# Patient Record
Sex: FEMALE | Race: WHITE | NOT HISPANIC OR LATINO | Employment: OTHER | URBAN - METROPOLITAN AREA
[De-identification: names, ages, dates, MRNs, and addresses within clinical notes are randomized per-mention and may not be internally consistent; named-entity substitution may affect disease eponyms.]

---

## 2017-05-15 ENCOUNTER — APPOINTMENT (OUTPATIENT)
Dept: GENERAL RADIOLOGY | Facility: CLINIC | Age: 67
End: 2017-05-15
Attending: EMERGENCY MEDICINE
Payer: MEDICARE

## 2017-05-15 ENCOUNTER — HOSPITAL ENCOUNTER (EMERGENCY)
Facility: CLINIC | Age: 67
Discharge: HOME | End: 2017-05-15
Attending: EMERGENCY MEDICINE | Admitting: EMERGENCY MEDICINE
Payer: MEDICARE

## 2017-05-15 VITALS
OXYGEN SATURATION: 96 % | WEIGHT: 195 LBS | DIASTOLIC BLOOD PRESSURE: 72 MMHG | TEMPERATURE: 97.9 F | RESPIRATION RATE: 16 BRPM | SYSTOLIC BLOOD PRESSURE: 133 MMHG | BODY MASS INDEX: 32.7 KG/M2
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
## 2017-05-15 DIAGNOSIS — S82.141A TIBIAL PLATEAU FRACTURE, RIGHT, CLOSED, INITIAL ENCOUNTER: ICD-10-CM

## 2017-05-15 PROCEDURE — 99283 EMERGENCY DEPT VISIT LOW MDM: CPT

## 2017-05-15 PROCEDURE — 73560 X-RAY EXAM OF KNEE 1 OR 2: CPT | Mod: RT

## 2017-05-15 PROCEDURE — 99284 EMERGENCY DEPT VISIT MOD MDM: CPT | Performed by: EMERGENCY MEDICINE

## 2017-05-15 PROCEDURE — 73610 X-RAY EXAM OF ANKLE: CPT | Mod: RT

## 2017-05-15 RX ORDER — HYDROCODONE BITARTRATE AND ACETAMINOPHEN 5; 325 MG/1; MG/1
1-2 TABLET ORAL EVERY 6 HOURS PRN
Qty: 10 TABLET | Refills: 0 | Status: SHIPPED | OUTPATIENT
Start: 2017-05-15 | End: 2017-05-30

## 2017-05-15 RX ORDER — HYDROCODONE BITARTRATE AND ACETAMINOPHEN 5; 325 MG/1; MG/1
1-2 TABLET ORAL EVERY 6 HOURS PRN
Qty: 20 TABLET | Refills: 0 | Status: ON HOLD | OUTPATIENT
Start: 2017-05-15 | End: 2017-06-02

## 2017-05-15 NOTE — ED NOTES
Fell 4 ft from ladder at 1600 yesterday landing on rt knee - unable to wt bear and rt foot is numb and tinglyrt pedal pulse weaker than left

## 2017-05-15 NOTE — DISCHARGE INSTRUCTIONS
Knee Fracture    You have a break, or fracture, of the knee joint. This causes pain, swelling, and sometimes bruising.  This type of fracture is treated with a splint, cast, or knee brace, also called an immobilizer. It will take about 4 to 6 weeks for the fracture to heal. But it may take much longer for you to fully recover and go back to all your activities. Surgery may be needed to fix severe injuries.     Home care    You will be given a splint, cast, or knee brace to prevent your knee joint from moving. Use crutches or a walker, unless you were told otherwise. Don t bear weight on your injured leg until your provider says it s OK to do so. Crutches and walkers can be rented at many pharmacies and surgical or orthopedic supply stores.    Keep your leg raised, or elevated, to reduce pain and swelling. When sleeping, place a pillow under your injured leg. When sitting, support your injured leg so it is level with your waist. This is very important during the first 48 hours.    Apply an ice pack over the injured area for no more than 15 to 20 minutes. Do this every 1 to 2 hours for the first 24 to 48 hours. Keep using ice packs as needed to ease pain and swelling.    To make an ice pack, put ice cubes in a sealed zip-lock plastic bag wrapped in a clean, thin towel or cloth. Never put ice or an ice pack directly on your skin. The ice pack can be put right on the cast, splint, or brace. As the ice melts, be careful that the cast, splint, or brace doesn t get wet.    If you have a hook-and-loop closure knee brace, and your healthcare provider approves, open the brace to put the ice pack directly on your knee. Wrap the ice pack in a clean, thin towel or cloth. Be careful not to move your knee.     Keep the cast, splint, or brace dry at all times. Bathe with your cast, splint, or brace out of the water. Protect it with 2 large plastic bags. Place 1 bag around the other. Tape each bag with duct tape at the top end.  Water can still leak in. So it's best to keep the cast, splint, or brace away from water. If a fiberglass splint or cast gets wet, dry it with a hair dryer on a cool setting.    You may use over-the-counter pain medicine to ease pain, unless another pain medicine was prescribed. Always talk with your provider before using these medicines if you have chronic liver or kidney disease, or ever had a stomach ulcer or GI (gastrointestinal) bleeding.      Follow-up care  Follow up with your healthcare provider within 1 week, or as advised. This is to be sure the bone is healing properly.  If any X-rays were taken, you will be told of any new findings that may affect your care.     When to seek medical advice  Call your healthcare provider right away if any of the following occur:    The plaster cast or splint gets wet or soft    The fiberglass cast or splint stays wet for more than 24 hours    The cast has a bad smell    The plaster cast or splint becomes loose    There is increased knee pain or tightness under the brace, splint, or cast    Your toes become swollen, cold, blue, numb, or tingly    2919-3884 [employer]. [address] [place] [address], [place] [address]. All rights reserved. This information is not intended as a substitute for professional medical care. Always follow your healthcare professional's instructions.

## 2017-05-15 NOTE — ED AVS SNAPSHOT
[facility] Emergency Department    [address] [address]    Phone:  [phone]    Fax:  [phone]                                       Anna Dahl   MRN: [id number]    Department:  [facility] Emergency Department   Date of Visit:  5/15/2017           Patient Information     Date Of Birth          1950        Your diagnoses for this visit were:     Fall, initial encounter     Tibial plateau fracture, right, closed, initial encounter        You were seen by Kranthi Reza MD.        Discharge Instructions         Knee Fracture    You have a break, or fracture, of the knee joint. This causes pain, swelling, and sometimes bruising.  This type of fracture is treated with a splint, cast, or knee brace, also called an immobilizer. It will take about 4 to 6 weeks for the fracture to heal. But it may take much longer for you to fully recover and go back to all your activities. Surgery may be needed to fix severe injuries.     Home care    You will be given a splint, cast, or knee brace to prevent your knee joint from moving. Use crutches or a walker, unless you were told otherwise. Don t bear weight on your injured leg until your provider says it s OK to do so. Crutches and walkers can be rented at many pharmacies and surgical or orthopedic supply stores.    Keep your leg raised, or elevated, to reduce pain and swelling. When sleeping, place a pillow under your injured leg. When sitting, support your injured leg so it is level with your waist. This is very important during the first 48 hours.    Apply an ice pack over the injured area for no more than 15 to 20 minutes. Do this every 1 to 2 hours for the first 24 to 48 hours. Keep using ice packs as needed to ease pain and swelling.    To make an ice pack, put ice cubes in a sealed zip-lock plastic bag wrapped in a clean, thin towel or cloth. Never put ice or an ice pack directly on your skin. The ice pack can be put right on  the cast, splint, or brace. As the ice melts, be careful that the cast, splint, or brace doesn t get wet.    If you have a hook-and-loop closure knee brace, and your healthcare provider approves, open the brace to put the ice pack directly on your knee. Wrap the ice pack in a clean, thin towel or cloth. Be careful not to move your knee.     Keep the cast, splint, or brace dry at all times. Bathe with your cast, splint, or brace out of the water. Protect it with 2 large plastic bags. Place 1 bag around the other. Tape each bag with duct tape at the top end. Water can still leak in. So it's best to keep the cast, splint, or brace away from water. If a fiberglass splint or cast gets wet, dry it with a hair dryer on a cool setting.    You may use over-the-counter pain medicine to ease pain, unless another pain medicine was prescribed. Always talk with your provider before using these medicines if you have chronic liver or kidney disease, or ever had a stomach ulcer or GI (gastrointestinal) bleeding.      Follow-up care  Follow up with your healthcare provider within 1 week, or as advised. This is to be sure the bone is healing properly.  If any X-rays were taken, you will be told of any new findings that may affect your care.     When to seek medical advice  Call your healthcare provider right away if any of the following occur:    The plaster cast or splint gets wet or soft    The fiberglass cast or splint stays wet for more than 24 hours    The cast has a bad smell    The plaster cast or splint becomes loose    There is increased knee pain or tightness under the brace, splint, or cast    Your toes become swollen, cold, blue, numb, or tingly    4305-3234 [employer]. [address] [place] [address], [place] [address]. All rights reserved. This information is not intended as a substitute for professional medical care. Always follow your healthcare professional's instructions.          24 Hour Appointment Hotline        To make an appointment at any [facility] clinic, call [phone] ([phone]). If you don't have a family doctor or clinic, we will help you find one. [facility] clinics are conveniently located to serve the needs of you and your family.          ED Discharge Orders     Knee Immobilizer           ORTHO  REFERRAL       [facility] is referring you to the Orthopedic  Services at [facility].       The  Representative will assist you in the coordination of your Orthopedic and Musculoskeletal Care as prescribed by your physician.    The  Representative will call you within 1 business day to help schedule your appointment, or you may contact the  Representative at:    All areas ~ [phone]     Type of Referral : Non Surgical       Timeframe requested: 3 - 5 days    Coverage of these services is subject to the terms and limitations of your health insurance plan.  Please call member services at your health plan with any benefit or coverage questions.      If X-rays, CT or MRI's have been performed, please contact the facility where they were done to arrange for , prior to your scheduled appointment.  Please bring this referral request to your appointment and present it to your specialist.                     Review of your medicines      START taking        Dose / Directions Last dose taken    * HYDROcodone-acetaminophen 5-325 MG per tablet   Commonly known as:  NORCO   Dose:  1-2 tablet   Quantity:  10 tablet        Take 1-2 tablets by mouth every 6 hours as needed for moderate to severe pain   Refills:  0        * HYDROcodone-acetaminophen 5-325 MG per tablet   Commonly known as:  NORCO   Dose:  1-2 tablet   Quantity:  20 tablet        Take 1-2 tablets by mouth every 6 hours as needed for moderate to severe pain   Refills:  0        * Notice:  This list has 2 medication(s) that are the same as other medications  prescribed for you. Read the directions carefully, and ask your doctor or other care provider to review them with you.      Our records show that you are taking the medicines listed below. If these are incorrect, please call your family doctor or clinic.        Dose / Directions Last dose taken    acetaminophen 325 MG tablet   Commonly known as:  TYLENOL   Dose:  650 mg   Quantity:  250 tablet        Take 2 tablets by mouth every 4 hours as needed.   Refills:  0        albuterol 108 (90 BASE) MCG/ACT Inhaler   Commonly known as:  PROAIR HFA/PROVENTIL HFA/VENTOLIN HFA   Dose:  2 puff   Quantity:  1 Inhaler        Inhale 2 puffs into the lungs every 4 hours as needed for shortness of breath / dyspnea   Refills:  2        alendronate 70 MG tablet   Commonly known as:  FOSAMAX   Dose:  70 mg   Quantity:  13 tablet        Take 1 tablet (70 mg) by mouth every 7 days Take with over 8 ounces water and stay upright for at least 30 minutes after dose.  Take at least 60 minutes before breakfast.   Refills:  3        aspirin 81 MG EC tablet   Dose:  81 mg   Quantity:  90 tablet        Take 1 tablet (81 mg) by mouth daily   Refills:  3        atorvastatin 40 MG tablet   Commonly known as:  LIPITOR   Dose:  40 mg   Quantity:  90 tablet        Take 1 tablet (40 mg) by mouth daily   Refills:  3        fluticasone 110 MCG/ACT Inhaler   Commonly known as:  FLOVENT HFA   Dose:  2 puff   Quantity:  3 Inhaler        Inhale 2 puffs into the lungs 2 times daily   Refills:  3        fluticasone 50 MCG/ACT spray   Commonly known as:  FLONASE   Dose:  2 spray   Quantity:  16 g        Spray 2 sprays into both nostrils daily   Refills:  prn        lisinopril 5 MG tablet   Commonly known as:  PRINIVIL/ZESTRIL   Dose:  5 mg   Quantity:  90 tablet        Take 1 tablet (5 mg) by mouth daily   Refills:  3        LORazepam 1 MG tablet   Commonly known as:  ATIVAN   Dose:  0.5-1 mg   Quantity:  10 tablet        Take 0.5-1 tablets (0.5-1 mg) by mouth  every 8 hours as needed for anxiety Take the night before a big event; may repeat in the AM if needed.   Refills:  1        metoprolol 25 MG 24 hr tablet   Commonly known as:  TOPROL XL   Dose:  25 mg   Quantity:  90 tablet        Take 1 tablet (25 mg) by mouth daily   Refills:  3        montelukast 10 MG tablet   Commonly known as:  SINGULAIR   Dose:  10 mg   Quantity:  90 tablet        Take 1 tablet (10 mg) by mouth At Bedtime   Refills:  3        nitroglycerin 0.4 MG sublingual tablet   Commonly known as:  NITROSTAT   Dose:  0.4 mg   Quantity:  25 tablet        Place 1 tablet (0.4 mg) under the tongue every 5 minutes as needed for chest pain   Refills:  3        omeprazole 40 MG capsule   Commonly known as:  priLOSEC   Dose:  40 mg   Quantity:  180 capsule        Take 1 capsule (40 mg) by mouth every 12 hours   Refills:  3        sertraline 25 MG tablet   Commonly known as:  ZOLOFT   Dose:  25 mg   Quantity:  90 tablet        Take 1 tablet (25 mg) by mouth daily   Refills:  3        sucralfate 1 GM tablet   Commonly known as:  CARAFATE   Dose:  1 g   Quantity:  360 tablet        Take 1 tablet (1 g) by mouth 4 times daily May dissolve in 2 tsp water.   Refills:  3                Prescriptions were sent or printed at these locations (2 Prescriptions)                   Other Prescriptions                Printed at Department/Unit printer (2 of 2)         HYDROcodone-acetaminophen (NORCO) 5-325 MG per tablet               HYDROcodone-acetaminophen (NORCO) 5-325 MG per tablet                Procedures and tests performed during your visit     Ankle XR, G/E 3 views, right    Knee XR, 2 view, right      Orders Needing Specimen Collection     None      Pending Results     No orders found from 5/13/2017 to 5/16/2017.            Pending Culture Results     No orders found from 5/13/2017 to 5/16/2017.            Pending Results Instructions     If you had any lab results that were not finalized at the time of your  Discharge, you can call the ED Lab Result RN at [phone]. You will be contacted by this team for any positive Lab results or changes in treatment. The nurses are available 7 days a week from 10A to 6:30P.  You can leave a message 24 hours per day and they will return your call.        Test Results From Your Hospital Stay        5/15/2017  9:39 AM      Narrative     XR ANKLE RT G/E 3 VW 5/15/2017 9:28 AM    HISTORY: Painful right ankle.    COMPARISON: None.    FINDINGS: No fracture or malalignment. The mortise joint is congruent.  Osseous structures are within normal limits.        Impression     IMPRESSION: No acute osseous abnormality.    MARY PUGA MD         5/15/2017  9:40 AM      Narrative     XR KNEE RT 1 /2 VW 5/15/2017 9:30 AM    HISTORY: Painful right knee. Swelling.    COMPARISON: None.    FINDINGS: Slightly depressed lateral tibial plateau fracture.  Degenerative change at the lateral femoral condyle without definite  fracture. No additional fractures. Small joint effusion.        Impression     IMPRESSION: Slightly depressed lateral tibial plateau fracture.    MARY PUGA MD                Thank you for choosing [facility]       Thank you for choosing [facility] for your care. Our goal is always to provide you with excellent care. Hearing back from our patients is one way we can continue to improve our services. Please take a few minutes to complete the written survey that you may receive in the mail after you visit with us. Thank you!        "[employer]hart Information     [employer] gives you secure access to your electronic health record. If you see a primary care provider, you can also send messages to your care team and make appointments. If you have questions, please call your primary care clinic.  If you do not have a primary care provider, please call [phone] and they will assist you.        Care EveryWhere ID     This is your Care EveryWhere ID. This could be used by other organizations to access  your [facility] medical records  [id number]        After Visit Summary       This is your record. Keep this with you and show to your community pharmacist(s) and doctor(s) at your next visit.

## 2017-05-15 NOTE — ED AVS SNAPSHOT
[facility] Emergency Department    [address] [address]    Phone:  [phone]    Fax:  [phone]                                       Anna Dahl   MRN: [id number]    Department:  [facility] Emergency Department   Date of Visit:  5/15/2017           After Visit Summary Signature Page     I have received my discharge instructions, and my questions have been answered. I have discussed any challenges I see with this plan with the nurse or doctor.    ..........................................................................................................................................  Patient/Patient Representative Signature      ..........................................................................................................................................  Patient Representative Print Name and Relationship to Patient    ..................................................               ................................................  Date                                            Time    ..........................................................................................................................................  Reviewed by Signature/Title    ...................................................              ..............................................  Date                                                            Time

## 2017-05-16 NOTE — ED PROVIDER NOTES
Chief complaint fall right knee pain    66-year-old female presents from home with .  She was 4 feet up on a ladder yesterday when she lost her balance fell backward landing on her right knee at 4:00 PM.  She has pain with any range of motion of the right knee and is unable to bear weight.  She describes some tingling in the dorsolateral right foot.  She denies pain or injury elsewhere.  No striking her head, any pain in her neck back chest or pelvis or other extremities.  She is not on anticoagulation therapy, does take 81 mg aspirin daily    Past medical history, medications, allergies, social history, family history all reviewed.  Patient Active Problem List   Diagnosis     Nasal polyp     Hiatal hernia     Colon polyps     GERD (gastroesophageal reflux disease)     Hyperlipidemia LDL goal <100     Advanced directives, counseling/discussion     Mild intermittent asthma with exacerbation     Chronic sinusitis     Non-ST elevation myocardial infarction (NSTEMI), initial care episode (H)     CAD (coronary artery disease)     BCC (basal cell carcinoma), face     Anxiety     Osteoporosis     ROS: All other systems reviewed and are negative.    /72  Temp 97.9  F (36.6  C) (Oral)  Resp 16  Wt 88.5 kg (195 lb)  SpO2 96%  BMI 32.7 kg/m2  Nontoxic appearing no respiratory distress alert and oriented ×3, GCS 15 on arrival and discharge  Head atraumatic normocephalic  TMs/EACs unremarkable, conjunctiva noninjected, oropharynx moist without lesions or erythema  No cervical adenopathy neck supple full active painless range of motion, no posterior midline tenderness  Spine nontender  Rib stable nontender  Pelvis stable nontender  Lungs clear to auscultation  Heart regular no murmur  Abdomen soft nontender bowel sounds positive no masses or HSM  Strength and sensation grossly intact throughout the extremities, gait and station normal  Speech is fluent, good eye contact, thought processes are rational  Right  lower extremity significant for right knee effusion, tender joint line medially and laterally, very limited active and passive range of motion, unable to test for ligamentous instability secondary to pain, remainder of right lower extremity exam is unremarkable sensation intact, pulses intact.    Results for orders placed or performed during the hospital encounter of 05/15/17   Ankle XR, G/E 3 views, right    Narrative    XR ANKLE RT G/E 3 VW 5/15/2017 9:28 AM    HISTORY: Painful right ankle.    COMPARISON: None.    FINDINGS: No fracture or malalignment. The mortise joint is congruent.  Osseous structures are within normal limits.      Impression    IMPRESSION: No acute osseous abnormality.    MARY PUGA MD   Knee XR, 2 view, right    Narrative    XR KNEE RT 1 /2 VW 5/15/2017 9:30 AM    HISTORY: Painful right knee. Swelling.    COMPARISON: None.    FINDINGS: Slightly depressed lateral tibial plateau fracture.  Degenerative change at the lateral femoral condyle without definite  fracture. No additional fractures. Small joint effusion.      Impression    IMPRESSION: Slightly depressed lateral tibial plateau fracture.    MARY PUGA MD     MDM: 66-year-old female with a fall yesterday, lateral minimally depressed tibial plateau fracture, reviewed with orthopedics and recommend nonweightbearing ×6 weeks follow-up sports med.  Knee immobilizer, crutch tutorial, hydrocodone prescription warned regarding side effects.    Impression: Fall, right lateral tibial plateau fracture     Kranthi Reza MD  05/16/17 [id number]

## 2017-05-19 ENCOUNTER — OFFICE VISIT (OUTPATIENT)
Dept: ORTHOPEDICS | Facility: CLINIC | Age: 67
End: 2017-05-19
Payer: COMMERCIAL

## 2017-05-19 VITALS
HEIGHT: 65 IN | WEIGHT: 195 LBS | SYSTOLIC BLOOD PRESSURE: 127 MMHG | DIASTOLIC BLOOD PRESSURE: 71 MMHG | BODY MASS INDEX: 32.49 KG/M2 | HEART RATE: 62 BPM

## 2017-05-19 DIAGNOSIS — S82.141A TIBIAL PLATEAU FRACTURE, RIGHT, CLOSED, INITIAL ENCOUNTER: Primary | ICD-10-CM

## 2017-05-19 PROCEDURE — 99204 OFFICE O/P NEW MOD 45 MIN: CPT | Performed by: PEDIATRICS

## 2017-05-19 NOTE — PATIENT INSTRUCTIONS
Plan:  - Today's Plan of Care:  Referral to an Orthopedic Surgeon  Activity Modification: Non-weightbearing  Medical Equipment: immobilizer    Advanced imaging is done by appointment in the imaging center.  Depending on your availability you can usually schedule within the next 1-2 days. Some insurance require a prior authorization to be completed which may delay the time until you are able to schedule your appointment.  Please call Advanced [facility] Central [phone] to schedule your CT.     The clinic will call you with results, if you have not heard from the clinic within 3-4 days following your CT please contact us at the number listed below.     Follow Up: as needed

## 2017-05-19 NOTE — MR AVS SNAPSHOT
After Visit Summary   2017    Anna Dahl    MRN: [id number]           Patient Information     Date Of Birth          1950        Visit Information        Provider Department      2017 1:00 PM Faiza Valentin MD [facility]        Today's Diagnoses     Tibial plateau fracture, right, closed, initial encounter    -  1      Care Instructions    Plan:  - Today's Plan of Care:  Referral to an Orthopedic Surgeon  Activity Modification: Non-weightbearing  Medical Equipment: immobilizer    Advanced imaging is done by appointment in the imaging center.  Depending on your availability you can usually schedule within the next 1-2 days. Some insurance require a prior authorization to be completed which may delay the time until you are able to schedule your appointment.  Please call Advanced Imaging Central [phone] to schedule your CT.     The clinic will call you with results, if you have not heard from the clinic within 3-4 days following your CT please contact us at the number listed below.     Follow Up: as needed          Follow-ups after your visit        Additional Services     ORTHO  REFERRAL       [facility] is referring you to the Orthopedic  Services at [facility] and [facility].       The  Representative will assist you in the coordination of your Orthopedic and Musculoskeletal Care as prescribed by your physician.    The  Representative will call you within 24 hours to help schedule your appointment, or you may contact the  Representative at:    [facility] ~ [phone]  [facility] ~ [phone]  [facility] ~ [phone]    Type of Referral : Surgical / Specialist - Dr. Krause      Timeframe requested: Routine     Coverage of these services is subject to the terms and limitations of your health insurance plan.   "Please call member services at your health plan with any benefit or coverage questions.      If X-rays, CT or MRI's have been performed, please contact the facility where they were done to arrange for , prior to your scheduled appointment.  Please bring this referral request to your appointment and present it to your specialist.                  Future tests that were ordered for you today     Open Future Orders        Priority Expected Expires Ordered    CT Knee Right w/o Contrast Routine  5/19/2018 5/19/2017            Who to contact     If you have questions or need follow up information about today's clinic visit or your schedule please contact [facility] directly at [phone].  Normal or non-critical lab and imaging results will be communicated to you by MyChart, letter or phone within 4 business days after the clinic has received the results. If you do not hear from us within 7 days, please contact the clinic through [employer] or phone. If you have a critical or abnormal lab result, we will notify you by phone as soon as possible.  Submit refill requests through "[employer] or call your pharmacy and they will forward the refill request to us. Please allow 3 business days for your refill to be completed.          Additional Information About Your Visit        [employer] Information     "[employer] gives you secure access to your electronic health record. If you see a primary care provider, you can also send messages to your care team and make appointments. If you have questions, please call your primary care clinic.  If you do not have a primary care provider, please call [phone] and they will assist you.        Care EveryWhere ID     This is your Care EveryWhere ID. This could be used by other organizations to access your [place] medical records  [id number]        Your Vitals Were     Pulse Height BMI (Body Mass Index)             62 5' 4.75\" (1.645 m) 32.7 kg/m2          " Blood Pressure from Last 3 Encounters:   05/19/17 127/71   05/15/17 133/72   07/07/16 123/82    Weight from Last 3 Encounters:   05/19/17 195 lb (88.5 kg)   05/15/17 195 lb (88.5 kg)   07/07/16 204 lb 3.2 oz (92.6 kg)              We Performed the Following     ORTHO  REFERRAL        Primary Care Provider Office Phone # Fax #    Nadia Vivi Ramirez [phone]       [facility] [address] [address] [facility] [address]        Thank you!     Thank you for choosing [facility]  for your care. Our goal is always to provide you with excellent care. Hearing back from our patients is one way we can continue to improve our services. Please take a few minutes to complete the written survey that you may receive in the mail after your visit with us. Thank you!             Your Updated Medication List - Protect others around you: Learn how to safely use, store and throw away your medicines at www.disposemymeds.org.          This list is accurate as of: 5/19/17  1:33 PM.  Always use your most recent med list.                   Brand Name Dispense Instructions for use    acetaminophen 325 MG tablet    TYLENOL    250 tablet    Take 2 tablets by mouth every 4 hours as needed.       albuterol 108 (90 BASE) MCG/ACT Inhaler    PROAIR HFA/PROVENTIL HFA/VENTOLIN HFA    1 Inhaler    Inhale 2 puffs into the lungs every 4 hours as needed for shortness of breath / dyspnea       alendronate 70 MG tablet    FOSAMAX    13 tablet    Take 1 tablet (70 mg) by mouth every 7 days Take with over 8 ounces water and stay upright for at least 30 minutes after dose.  Take at least 60 minutes before breakfast.       aspirin 81 MG EC tablet     90 tablet    Take 1 tablet (81 mg) by mouth daily       atorvastatin 40 MG tablet    LIPITOR    90 tablet    Take 1 tablet (40 mg) by mouth daily       fluticasone 110 MCG/ACT Inhaler    FLOVENT HFA    3 Inhaler    Inhale 2 puffs into the lungs 2  times daily       fluticasone 50 MCG/ACT spray    FLONASE    16 g    Spray 2 sprays into both nostrils daily       * HYDROcodone-acetaminophen 5-325 MG per tablet    NORCO    10 tablet    Take 1-2 tablets by mouth every 6 hours as needed for moderate to severe pain       * HYDROcodone-acetaminophen 5-325 MG per tablet    NORCO    20 tablet    Take 1-2 tablets by mouth every 6 hours as needed for moderate to severe pain       lisinopril 5 MG tablet    PRINIVIL/ZESTRIL    90 tablet    Take 1 tablet (5 mg) by mouth daily       LORazepam 1 MG tablet    ATIVAN    10 tablet    Take 0.5-1 tablets (0.5-1 mg) by mouth every 8 hours as needed for anxiety Take the night before a big event; may repeat in the AM if needed.       metoprolol 25 MG 24 hr tablet    TOPROL XL    90 tablet    Take 1 tablet (25 mg) by mouth daily       montelukast 10 MG tablet    SINGULAIR    90 tablet    Take 1 tablet (10 mg) by mouth At Bedtime       nitroglycerin 0.4 MG sublingual tablet    NITROSTAT    25 tablet    Place 1 tablet (0.4 mg) under the tongue every 5 minutes as needed for chest pain       omeprazole 40 MG capsule    priLOSEC    180 capsule    Take 1 capsule (40 mg) by mouth every 12 hours       sertraline 25 MG tablet    ZOLOFT    90 tablet    Take 1 tablet (25 mg) by mouth daily       sucralfate 1 GM tablet    CARAFATE    360 tablet    Take 1 tablet (1 g) by mouth 4 times daily May dissolve in 2 tsp water.       * Notice:  This list has 2 medication(s) that are the same as other medications prescribed for you. Read the directions carefully, and ask your doctor or other care provider to review them with you.

## 2017-05-19 NOTE — PROGRESS NOTES
Sports Medicine Clinic Visit    PCP: Nadia Ramirez    Anna Dahl is a 66 year old female who is seen  as an ER referral presenting with a right knee injury.    Injury: Patient reports an injury 5 days ago, 5/14/17.  She fell from about 4 feet high on a ladder onto right knee.  Most of the pain is on the side, extends down her leg.  Does have significant swelling as well.    Location of Pain: superior knee, swelling and tingling into foot  Duration of Pain: 5 day(s)  Rating of Pain at worst: 10/10  Rating of Pain Currently: 4/10  Symptoms are better with: elevation  Symptoms are worse with: any movement  Additional Features:   Positive: swelling and bruising, numbness and tingling in foot.   Negative: popping, grinding, catching, locking, instability, weakness, pain in other joints and systemic symptoms  Other evaluation and/or treatments so far consists of: xr in ER  Prior History of related problems: mild soreness in the month prior to her fall    Social History: retired    Review of Systems  Skin: yes bruising, yes swelling  Musculoskeletal: as above  Neurologic: no numbness, paresthesias  Remainder of review of systems is negative including constitutional, CV, pulmonary, GI, except as noted in HPI or medical history.    Patient's current problem list, past medical and surgical history, and family history were reviewed.    Patient Active Problem List   Diagnosis     Nasal polyp     Hiatal hernia     Colon polyps     GERD (gastroesophageal reflux disease)     Hyperlipidemia LDL goal <100     Advanced directives, counseling/discussion     Mild intermittent asthma with exacerbation     Chronic sinusitis     Non-ST elevation myocardial infarction (NSTEMI), initial care episode (H)     CAD (coronary artery disease)     BCC (basal cell carcinoma), face     Anxiety     Osteoporosis     Past Medical History:   Diagnosis Date     Asthma      Basal cell carcinoma      Calculus of kidney      Gastro-oesophageal  "reflux disease      Renal colic      Unspecified asthma(493.90)      Unspecified nasal polyp     Nasal polyps     Unspecified otitis media      Unspecified sinusitis (chronic)     Chronic sinusitis     Past Surgical History:   Procedure Laterality Date     ENT SURGERY       ESOPHAGOSCOPY, GASTROSCOPY, DUODENOSCOPY (EGD), COMBINED  2/6/2013    Procedure: COMBINED ESOPHAGOSCOPY, GASTROSCOPY, DUODENOSCOPY (EGD), BIOPSY SINGLE OR MULTIPLE;  Gastroscopy;  Surgeon: Yves Mills MD;  Location: [facility]     ETHMOIDECTOMY  1/5/2011    ETHMOIDECTOMY performed by ADDY SERRANO at [facility]     MOHS MICROGRAPHIC PROCEDURE       MOHS MICROGRAPHIC PROCEDURE       SURGICAL HISTORY OF -   6/2003    Bilateral total ethmoidecomy with bilateral maxillary antrostomies with removal of polyps, bilateral frontal sinusotomies, and left sphenoidotomy     SURGICAL HISTORY OF -   1985    Bilateral intranasal polypectomy with bilateraly nasal antral punctures      SURGICAL HISTORY OF -   2004    Mohs micrographic surgery, fresh tissue technique with complex wound repair. below right ear at angle of jawline.     TUBAL LIGATION      tubal ligation     Family History   Problem Relation Age of Onset     HEART DISEASE Father      heart surgery     Lipids Father      Alzheimer Disease Mother      Lipids Brother      Lipids Brother          Objective  /71  Pulse 62  Ht 5' 4.75\" (1.645 m)  Wt 195 lb (88.5 kg)  BMI 32.7 kg/m2    GENERAL APPEARANCE: healthy, alert and no distress   GAIT: wheelchair  SKIN: no suspicious lesions or rashes  HEENT: Sclera clear, anicteric  CV: good peripheral pulses  RESP: Breathing not labored  NEURO: Normal strength and tone, mentation intact and speech normal  PSYCH:  mentation appears normal and affect normal/bright    Right Knee exam  *Very difficult exam due to patient positioning in wheelchair    Inspection:      moderate effusion right       mild swelling right    Patella:      Mobility -       " hypomobile right    Tender:      medial patellar border right       lateral patellar border right       medial joint line right       lateral joint line right    Non Tender:      remainder of knee area bilateral    Knee ROM:      Flexion 20 degrees right       Extension 0 degrees right    Strength:      Unable to test    Special Tests:     Unable to test    Gait:      unable to bear weight due to pain    Neurovascular:      2+ peripheral pulses bilaterally and brisk capillary refill       sensation grossly intact    Radiology  I visualized and reviewed these images with the patient  XR KNEE RT 1 /2 VW 5/15/2017 9:30 AM     HISTORY: Painful right knee. Swelling.     COMPARISON: None.     FINDINGS: Slightly depressed lateral tibial plateau fracture.  Degenerative change at the lateral femoral condyle without definite  fracture. No additional fractures. Small joint effusion.         IMPRESSION: Slightly depressed lateral tibial plateau fracture.    Assessment:  1. Tibial plateau fracture, right, closed, initial encounter      Slightly displaced tibial plateau fracture.  I recommend CT to evaluate and surgical referral to discuss options for treatment both operative and non-operative.  Continue bracing and other supportive care (rest, ice, elevation and compression) in the interim.    Plan:  - Today's Plan of Care:  Referral to an Orthopedic Surgeon  Activity Modification: Non-weightbearing  Medical Equipment: immobilizer    The clinic will call you with results, if you have not heard from the clinic within 3-4 days following your CT please contact us at the number listed below.     Follow Up: as needed    Concerning signs and symptoms were reviewed.  The patient expressed understanding of this management plan and all questions were answered at this time.    Faiza Valentin MD CAQ  Primary Care Sports Medicine  [facility]

## 2017-05-19 NOTE — NURSING NOTE
"Chief Complaint   Patient presents with     Knee right       Initial /71  Pulse 62  Ht 5' 4.75\" (1.645 m)  Wt 195 lb (88.5 kg)  BMI 32.7 kg/m2 Estimated body mass index is 32.7 kg/(m^2) as calculated from the following:    Height as of this encounter: 5' 4.75\" (1.645 m).    Weight as of this encounter: 195 lb (88.5 kg).  Medication Reconciliation: complete    "

## 2017-05-22 ENCOUNTER — HOSPITAL ENCOUNTER (OUTPATIENT)
Dept: CT IMAGING | Facility: CLINIC | Age: 67
Discharge: HOME | End: 2017-05-22
Attending: PEDIATRICS | Admitting: PEDIATRICS
Payer: MEDICARE

## 2017-05-22 DIAGNOSIS — S82.141A TIBIAL PLATEAU FRACTURE, RIGHT, CLOSED, INITIAL ENCOUNTER: ICD-10-CM

## 2017-05-22 PROCEDURE — 73700 CT LOWER EXTREMITY W/O DYE: CPT | Mod: RT

## 2017-05-24 ENCOUNTER — TELEPHONE (OUTPATIENT)
Dept: ORTHOPEDICS | Facility: CLINIC | Age: 67
End: 2017-05-24

## 2017-05-24 NOTE — TELEPHONE ENCOUNTER
Spoke with patient regarding their CT results and plan. Pt is in agreement with this plan. She has an appointment tomorrow with ortho surgery.     Dianna Brown M.Ed., ATR, ATC

## 2017-05-24 NOTE — TELEPHONE ENCOUNTER
Please call patient with CT results:    CT KNEE RIGHT WITHOUT CONTRAST May 22, 2017 12:56 PM  HISTORY: Evaluate tibial fracture.  TECHNIQUE: Helical axial scans with sagittal and coronal  reconstructions. Radiation dose for this scan was reduced using  automated exposure control, adjustment of the mA and/or kV according  to patient size, or iterative reconstruction technique.  COMPARISON: Plain film 5/15/2017.  FINDINGS: There is an impaction type fracture involving the lateral  tibial plateau. This is not very comminuted with basically one major  fracture fragment. Maximum articular surface step-off is seen far  medially measuring about 2 mm (see image 36 of series 5). No other  acute appearing bony abnormalities. Pre-existing degenerative changes  in the lateral compartment with joint space narrowing, subchondral  cystic change on both sides of the joint as well as degenerative  marginal bony spurring. The medial compartment is unremarkable to the  extent visualized by CT. Probable articular cartilage thinning at the  lateral aspect of the patellofemoral compartment. There is a  moderate-sized lipohemarthrosis and very small popliteal cyst.  Remainder of soft tissues appear normal to the extent visualized by  CT.  IMPRESSION:  1. Minimally depressed impaction type fracture lateral tibial plateau  with maximum articular surface step-off of 2 mm.  2. Lipohemarthrosis and very small popliteal cyst.  3. Pre-existing osteoarthritis lateral compartment and possibly  lateral aspect of the patellofemoral compartment.    In Summary:  - Depressed lateral tibial plateau fracture with 2mm of articular surface step off    I Recommend:  - Continued non weight bearing and follow up with orthopedics as discussed at last visit (patient may already have appointment as this order was placed at visit)    Faiza Valentin MD

## 2017-05-31 ENCOUNTER — ANESTHESIA EVENT (OUTPATIENT)
Dept: SURGERY | Facility: CLINIC | Age: 67
DRG: 470 | End: 2017-05-31
Payer: MEDICARE

## 2017-05-31 ENCOUNTER — OFFICE VISIT (OUTPATIENT)
Dept: FAMILY MEDICINE | Facility: CLINIC | Age: 67
End: 2017-05-31
Payer: COMMERCIAL

## 2017-05-31 VITALS
HEIGHT: 65 IN | DIASTOLIC BLOOD PRESSURE: 76 MMHG | SYSTOLIC BLOOD PRESSURE: 125 MMHG | RESPIRATION RATE: 18 BRPM | HEART RATE: 68 BPM | WEIGHT: 195 LBS | TEMPERATURE: 99 F | BODY MASS INDEX: 32.49 KG/M2

## 2017-05-31 DIAGNOSIS — K44.9 HIATAL HERNIA: ICD-10-CM

## 2017-05-31 DIAGNOSIS — J45.21 MILD INTERMITTENT ASTHMA WITH EXACERBATION: ICD-10-CM

## 2017-05-31 DIAGNOSIS — C44.310 BCC (BASAL CELL CARCINOMA), FACE: ICD-10-CM

## 2017-05-31 DIAGNOSIS — I21.4 NON-ST ELEVATION MYOCARDIAL INFARCTION (NSTEMI), INITIAL CARE EPISODE (H): ICD-10-CM

## 2017-05-31 DIAGNOSIS — E78.5 HYPERLIPIDEMIA LDL GOAL <100: ICD-10-CM

## 2017-05-31 DIAGNOSIS — M81.0 OSTEOPOROSIS: ICD-10-CM

## 2017-05-31 DIAGNOSIS — S82.141D TIBIAL PLATEAU FRACTURE, RIGHT, CLOSED, WITH ROUTINE HEALING, SUBSEQUENT ENCOUNTER: ICD-10-CM

## 2017-05-31 DIAGNOSIS — Z01.818 PREOP GENERAL PHYSICAL EXAM: Primary | ICD-10-CM

## 2017-05-31 DIAGNOSIS — I25.119 CORONARY ARTERY DISEASE INVOLVING NATIVE HEART WITH ANGINA PECTORIS, UNSPECIFIED VESSEL OR LESION TYPE (H): ICD-10-CM

## 2017-05-31 DIAGNOSIS — J32.9 CHRONIC SINUSITIS, UNSPECIFIED LOCATION: ICD-10-CM

## 2017-05-31 DIAGNOSIS — M17.11 OSTEOARTHRITIS OF RIGHT KNEE, UNSPECIFIED OSTEOARTHRITIS TYPE: ICD-10-CM

## 2017-05-31 DIAGNOSIS — Z71.89 ADVANCED DIRECTIVES, COUNSELING/DISCUSSION: ICD-10-CM

## 2017-05-31 DIAGNOSIS — K21.9 GASTROESOPHAGEAL REFLUX DISEASE WITHOUT ESOPHAGITIS: ICD-10-CM

## 2017-05-31 DIAGNOSIS — J33.9 NASAL POLYP: ICD-10-CM

## 2017-05-31 DIAGNOSIS — K63.5 COLON POLYPS: ICD-10-CM

## 2017-05-31 DIAGNOSIS — F41.9 ANXIETY: ICD-10-CM

## 2017-05-31 PROCEDURE — 99214 OFFICE O/P EST MOD 30 MIN: CPT | Performed by: FAMILY MEDICINE

## 2017-05-31 NOTE — PROGRESS NOTES
[facility]  [address]  [facility] [address]  [phone]  Dept: [phone]    PRE-OP EVALUATION:  Today's date: 2017    Anna Dahl (: 1950) presents for pre-operative evaluation assessment as requested by Dr. Colin Krause.  She requires evaluation and anesthesia risk assessment prior to undergoing surgery/procedure for treatment of  Right knee pain  .  Proposed procedure: Replacement Right knee     Date of Surgery/ Procedure: 17  Time of Surgery/ Procedure: 2 pm   Hospital/Surgical Facility: [facility], [place]     Primary Physician: Nadia Ramirez  Type of Anesthesia Anticipated: Spinal    Patient has a Health Care Directive or Living Will:  NO    1. YES - Do you have a history of heart attack, stroke, stent, bypass or surgery on an artery in the head, neck, heart or legs?  2. NO - Do you ever have any pain or discomfort in your chest?  3. NO - Do you have a history of  Heart Failure?  4. NO - Are you troubled by shortness of breath when: walking on the level, up a slight hill or at night?  5. NO - Do you currently have a cold, bronchitis or other respiratory infection?  6. NO - Do you have a cough, shortness of breath or wheezing?  7. NO - Do you sometimes get pains in the calves of your legs when you walk?  8. NO - Do you or anyone in your family have previous history of blood clots?  9. NO - Do you or does anyone in your family have a serious bleeding problem such as prolonged bleeding following surgeries or cuts?  10. NO - Have you ever had problems with anemia or been told to take iron pills?  11. NO - Have you had any abnormal blood loss such as black, tarry or bloody stools, or abnormal vaginal bleeding?  12. NO - Have you ever had a blood transfusion?  13. NO - Have you or any of your relatives ever had problems with anesthesia?  14. NO - Do you have sleep apnea, excessive snoring or daytime drowsiness?  15. NO - Do you have any prosthetic heart  "valves?  16. NO - Do you have prosthetic joints?  17. NO - Is there any chance that you may be pregnant?      HPI:                                                      Brief HPI related to upcoming procedure:       She is scheduled for right TKA. A couple weeks ago she fell and sustained a tibial plateau fracture on the right. She has seen the orthopedist in because of advanced osteoarthritis in that knee. She has opted to have a total knee arthroplasty. The surgeon is requesting consultation regarding anesthesia and surgical risk for this patient with respect to current and past medical conditions.      MEDICAL HISTORY:                                                      Patient Active Problem List    Diagnosis Date Noted     CAD (coronary artery disease) 05/14/2012     Priority: High     NSTEMI May 2012      EF 35%  PCI to mid-LAD vessel and first diagonal.  Plan to return at end of May 2012 for staged stenting of RCA  Rx Effient for one year, start Crestor       Osteoporosis 10/13/2015     Priority: Medium     Anxiety 10/03/2013     Priority: Medium     BCC (basal cell carcinoma), face 03/28/2013     Priority: Medium     Non-ST elevation myocardial infarction (NSTEMI), initial care episode (H) 05/13/2012     Priority: Medium     Chronic sinusitis 08/23/2011     Priority: Medium     Advanced directives, counseling/discussion 08/22/2011     Priority: Medium     Patient does not have an Advance/Health Care Directive (HCD), given \"What is Advance Care Planning?\" flyer.    Izzy Maria  August 22, 2011         Mild intermittent asthma with exacerbation 08/22/2011     Priority: Medium     GERD (gastroesophageal reflux disease) 04/20/2011     Priority: Medium     Hyperlipidemia LDL goal <100 04/20/2011     Priority: Medium     Did not tolerate Crestor  Did not tolerate 80 mg of atorvastatin, but has been able to take 40 mg       Colon polyps 10/19/2010     Priority: Medium     20 mm polyp removed Oct 2010  Recommend " repeat colonoscopy 1 year       Hiatal hernia 09/08/2010     Priority: Medium     Nasal polyp      Priority: Medium     Nasal polyps  Problem list name updated by automated process. Provider to review        Past Medical History:   Diagnosis Date     Asthma      Basal cell carcinoma      Calculus of kidney      Gastro-oesophageal reflux disease      Renal colic      Unspecified asthma(493.90)      Unspecified nasal polyp     Nasal polyps     Unspecified otitis media      Unspecified sinusitis (chronic)     Chronic sinusitis     Past Surgical History:   Procedure Laterality Date     ENT SURGERY       ESOPHAGOSCOPY, GASTROSCOPY, DUODENOSCOPY (EGD), COMBINED  2/6/2013    Procedure: COMBINED ESOPHAGOSCOPY, GASTROSCOPY, DUODENOSCOPY (EGD), BIOPSY SINGLE OR MULTIPLE;  Gastroscopy;  Surgeon: Yves Mills MD;  Location: [facility]     ETHMOIDECTOMY  1/5/2011    ETHMOIDECTOMY performed by ADDY SERRANO at [facility]     MOHS MICROGRAPHIC PROCEDURE       MOHS MICROGRAPHIC PROCEDURE       SURGICAL HISTORY OF -   6/2003    Bilateral total ethmoidecomy with bilateral maxillary antrostomies with removal of polyps, bilateral frontal sinusotomies, and left sphenoidotomy     SURGICAL HISTORY OF -   1985    Bilateral intranasal polypectomy with bilateraly nasal antral punctures      SURGICAL HISTORY OF -   2004    Mohs micrographic surgery, fresh tissue technique with complex wound repair. below right ear at angle of jawline.     TUBAL LIGATION      tubal ligation     Current Outpatient Prescriptions   Medication Sig Dispense Refill     order for DME Equipment being ordered: immobilizer 1 Device 0     HYDROcodone-acetaminophen (NORCO) 5-325 MG per tablet Take 1-2 tablets by mouth every 6 hours as needed for moderate to severe pain 20 tablet 0     lisinopril (PRINIVIL,ZESTRIL) 5 MG tablet Take 1 tablet (5 mg) by mouth daily 90 tablet 3     fluticasone (FLONASE) 50 MCG/ACT nasal spray Spray 2 sprays into both nostrils daily 16  g prn     atorvastatin (LIPITOR) 40 MG tablet Take 1 tablet (40 mg) by mouth daily 90 tablet 3     metoprolol (TOPROL XL) 25 MG 24 hr tablet Take 1 tablet (25 mg) by mouth daily 90 tablet 3     montelukast (SINGULAIR) 10 MG tablet Take 1 tablet (10 mg) by mouth At Bedtime 90 tablet 3     fluticasone (FLOVENT HFA) 110 MCG/ACT inhaler Inhale 2 puffs into the lungs 2 times daily 3 Inhaler 3     sucralfate (CARAFATE) 1 GM tablet Take 1 tablet (1 g) by mouth 4 times daily May dissolve in 2 tsp water. 360 tablet 3     omeprazole (PRILOSEC) 40 MG capsule Take 1 capsule (40 mg) by mouth every 12 hours 180 capsule 3     alendronate (FOSAMAX) 70 MG tablet Take 1 tablet (70 mg) by mouth every 7 days Take with over 8 ounces water and stay upright for at least 30 minutes after dose.  Take at least 60 minutes before breakfast. 13 tablet 3     LORazepam (ATIVAN) 1 MG tablet Take 0.5-1 tablets (0.5-1 mg) by mouth every 8 hours as needed for anxiety Take the night before a big event; may repeat in the AM if needed. 10 tablet 1     sertraline (ZOLOFT) 25 MG tablet Take 1 tablet (25 mg) by mouth daily 90 tablet 3     aspirin 81 MG EC tablet Take 1 tablet (81 mg) by mouth daily 90 tablet 3     nitroglycerin (NITROSTAT) 0.4 MG SL tablet Place 1 tablet (0.4 mg) under the tongue every 5 minutes as needed for chest pain 25 tablet 3     albuterol (PROAIR HFA, PROVENTIL HFA, VENTOLIN HFA) 108 (90 BASE) MCG/ACT inhaler Inhale 2 puffs into the lungs every 4 hours as needed for shortness of breath / dyspnea 1 Inhaler 2     acetaminophen (TYLENOL) 325 MG tablet Take 2 tablets by mouth every 4 hours as needed. 250 tablet      OTC products: None, except as noted above    Allergies   Allergen Reactions     Amoxicillin Anaphylaxis     Rosuvastatin Other (See Comments)     Severe headache, backache     Reglan [Metoclopramide Hcl] Visual Disturbance     Disoriented, hallucinations      Latex Allergy: NO    Social History   Substance Use Topics      "Smoking status: Never Smoker     Smokeless tobacco: Never Used     Alcohol use Yes      Comment: moderate couple drinks on weekends     History   Drug Use No       REVIEW OF SYSTEMS:                                                    Constitutional, neuro, ENT, endocrine, pulmonary, cardiac, gastrointestinal, genitourinary, musculoskeletal, integument and psychiatric systems are negative, except as otherwise noted.    EXAM:                                                    /76  Pulse 68  Temp 99  F (37.2  C)  Resp 18  Ht 5' 4.75\" (1.645 m)  Wt 195 lb (88.5 kg)  BMI 32.7 kg/m2    GENERAL APPEARANCE: healthy, alert and no distress     EYES: EOMI, PERRL     HENT: ear canals and TM's normal and nose and mouth without ulcers or lesions     NECK: no adenopathy, no asymmetry, masses, or scars and thyroid normal to palpation     RESP: lungs clear to auscultation - no rales, rhonchi or wheezes     CV: regular rates and rhythm, normal S1 S2, no S3 or S4 and no murmur, click or rub     ABDOMEN:  soft, nontender, no HSM or masses and bowel sounds normal     MS: extremities normal- no gross deformities noted, no evidence of inflammation in joints, FROM in all extremities.     SKIN: no suspicious lesions or rashes     NEURO: Normal strength and tone, sensory exam grossly normal, mentation intact and speech normal     PSYCH: mentation appears normal. and affect normal/bright     LYMPHATICS: No axillary, cervical, or supraclavicular nodes    DIAGNOSTICS:                                                    EKG: Not indicated due to non-vascular surgery and low risk of event (no recent symptoms or cardiac issues for last 3+ years)    Recent Labs   Lab Test  07/07/16   1109  05/15/14   0824  07/28/13   1108  04/12/13   0115   06/05/12   0745   05/31/12   0718   05/12/12   2356   HGB   --    --   13.1  13.7   < >   --    < >  12.9   < >  13.1   PLT   --    --   212  243   < >   --    < >  207   < >  207   INR   --    --    " --    --    --    --    --   1.11   --   1.11   NA  139  142  141  140   < >   --    < >  141   < >   --    POTASSIUM  4.3  4.4  4.4  4.0   < >   --    < >  4.4   < >   --    CR  0.87  0.92  0.75  0.93   < >   --    < >  0.88   < >   --    A1C   --    --    --    --    --   5.7   --    --    --    --     < > = values in this interval not displayed.        IMPRESSION:                                                    Reason for surgery/procedure: Tibia fracture  Diagnosis/reason for consult:    Preop general physical exam  Tibial plateau fracture, right, closed, with routine healing, subsequent encounter  Osteoarthritis of right knee, unspecified osteoarthritis type  Nasal polyp  Hiatal hernia  Colon polyps  Gastroesophageal reflux disease without esophagitis  Hyperlipidemia LDL goal <100  Advanced directives, counseling/discussion  Mild intermittent asthma with exacerbation  Chronic sinusitis, unspecified location  Non-ST elevation myocardial infarction (NSTEMI), initial care episode (H)  Coronary artery disease involving native heart with angina pectoris, unspecified vessel or lesion type (H)  BCC (basal cell carcinoma), face  Anxiety  Osteoporosis      The proposed surgical procedure is considered INTERMEDIATE risk.    REVISED CARDIAC RISK INDEX  The patient has the following serious cardiovascular risks for perioperative complications such as (MI, PE, VFib and 3  AV Block):  Coronary Artery Disease (MI, positive stress test, angina, Qs on EKG)  INTERPRETATION: 1 risks: Class II (low risk - 0.9% complication rate)    The patient has the following additional risks for perioperative complications:  No identified additional risks    No diagnosis found.    RECOMMENDATIONS:                                                          --Patient is to take all scheduled medications on the day of surgery EXCEPT for modifications listed below.  We discussed all of her medications and I answered her questions about taking  these. She is scheduled into the surgery schedule tomorrow because of a cancellation so she has been taking her aspirin up until today. I told her to stop taking that tomorrow morning. Her other medications should be okay to continue.    APPROVAL GIVEN to proceed with proposed procedure, without further diagnostic evaluation       Signed Electronically by: Deivn Yoo MD    Copy of this evaluation report is provided to requesting physician.    [facility] Preop Guidelines

## 2017-05-31 NOTE — MR AVS SNAPSHOT
After Visit Summary   5/31/2017    Anna Dahl    MRN: [id number]           Patient Information     Date Of Birth          1950        Visit Information        Provider Department      5/31/2017 1:20 PM Devin Yoo MD [facility]        Today's Diagnoses     Preop general physical exam    -  1    Tibial plateau fracture, right, closed, with routine healing, subsequent encounter        Osteoarthritis of right knee, unspecified osteoarthritis type        Nasal polyp        Hiatal hernia        Colon polyps        Gastroesophageal reflux disease without esophagitis        Hyperlipidemia LDL goal <100        Advanced directives, counseling/discussion        Mild intermittent asthma with exacerbation        Chronic sinusitis, unspecified location        Non-ST elevation myocardial infarction (NSTEMI), initial care episode (H)        Coronary artery disease involving native heart with angina pectoris, unspecified vessel or lesion type (H)        BCC (basal cell carcinoma), face        Anxiety        Osteoporosis          Care Instructions      Before Your Surgery      Call your surgeon if there is any change in your health. This includes signs of a cold or flu (such as a sore throat, runny nose, cough, rash or fever).    Do not smoke, drink alcohol or take over the counter medicine (unless your surgeon or primary care doctor tells you to) for the 24 hours before and after surgery.    If you take prescribed drugs: Follow your doctor s orders about which medicines to take and which to stop until after surgery.    Eating and drinking prior to surgery: follow the instructions from your surgeon    Take a shower or bath the night before surgery. Use the soap your surgeon gave you to gently clean your skin. If you do not have soap from your surgeon, use your regular soap. Do not shave or scrub the surgery site.  Wear clean pajamas and have clean sheets on your bed.            "Follow-ups after your visit        Your next 10 appointments already scheduled     Jun 01, 2017   Procedure with Colin Krause MD   [facility] (--)    [address] [address] [phone]           The medical center is located at [address]. (between [address] and [address] in [place], [place] miles north of [place]).              Who to contact     If you have questions or need follow up information about today's clinic visit or your schedule please contact [facility] directly at [phone].  Normal or non-critical lab and imaging results will be communicated to you by Reglarehart, letter or phone within 4 business days after the clinic has received the results. If you do not hear from us within 7 days, please contact the clinic through Morgan Solart or phone. If you have a critical or abnormal lab result, we will notify you by phone as soon as possible.  Submit refill requests through Realitycheck or call your pharmacy and they will forward the refill request to us. Please allow 3 business days for your refill to be completed.          Additional Information About Your Visit        MyChart Information     Realitycheck gives you secure access to your electronic health record. If you see a primary care provider, you can also send messages to your care team and make appointments. If you have questions, please call your primary care clinic.  If you do not have a primary care provider, please call [phone] and they will assist you.        Care EveryWhere ID     This is your Care EveryWhere ID. This could be used by other organizations to access your [facility] medical records  [id number]        Your Vitals Were     Pulse Temperature Respirations Height BMI (Body Mass Index)       68 99  F (37.2  C) 18 5' 4.75\" (1.645 m) 32.7 kg/m2        Blood Pressure from Last 3 Encounters:   05/31/17 125/76   05/19/17 127/71   05/15/17 133/72    Weight from Last 3 " Encounters:   05/31/17 195 lb (88.5 kg)   05/19/17 195 lb (88.5 kg)   05/15/17 195 lb (88.5 kg)              Today, you had the following     No orders found for display       Primary Care Provider Office Phone # Fax #    Nadia Vivi Ramirez [phone]       [facility] [address]  [facility] [address]        Thank you!     Thank you for choosing [facility]  for your care. Our goal is always to provide you with excellent care. Hearing back from our patients is one way we can continue to improve our services. Please take a few minutes to complete the written survey that you may receive in the mail after your visit with us. Thank you!             Your Updated Medication List - Protect others around you: Learn how to safely use, store and throw away your medicines at www.disposemymeds.org.          This list is accurate as of: 5/31/17  1:56 PM.  Always use your most recent med list.                   Brand Name Dispense Instructions for use    acetaminophen 325 MG tablet    TYLENOL    250 tablet    Take 2 tablets by mouth every 4 hours as needed.       albuterol 108 (90 BASE) MCG/ACT Inhaler    PROAIR HFA/PROVENTIL HFA/VENTOLIN HFA    1 Inhaler    Inhale 2 puffs into the lungs every 4 hours as needed for shortness of breath / dyspnea       alendronate 70 MG tablet    FOSAMAX    13 tablet    Take 1 tablet (70 mg) by mouth every 7 days Take with over 8 ounces water and stay upright for at least 30 minutes after dose.  Take at least 60 minutes before breakfast.       aspirin 81 MG EC tablet     90 tablet    Take 1 tablet (81 mg) by mouth daily       atorvastatin 40 MG tablet    LIPITOR    90 tablet    Take 1 tablet (40 mg) by mouth daily       fluticasone 110 MCG/ACT Inhaler    FLOVENT HFA    3 Inhaler    Inhale 2 puffs into the lungs 2 times daily       fluticasone 50 MCG/ACT spray    FLONASE    16 g    Spray 2 sprays into both nostrils daily        HYDROcodone-acetaminophen 5-325 MG per tablet    NORCO    20 tablet    Take 1-2 tablets by mouth every 6 hours as needed for moderate to severe pain       lisinopril 5 MG tablet    PRINIVIL/ZESTRIL    90 tablet    Take 1 tablet (5 mg) by mouth daily       LORazepam 1 MG tablet    ATIVAN    10 tablet    Take 0.5-1 tablets (0.5-1 mg) by mouth every 8 hours as needed for anxiety Take the night before a big event; may repeat in the AM if needed.       metoprolol 25 MG 24 hr tablet    TOPROL XL    90 tablet    Take 1 tablet (25 mg) by mouth daily       montelukast 10 MG tablet    SINGULAIR    90 tablet    Take 1 tablet (10 mg) by mouth At Bedtime       nitroglycerin 0.4 MG sublingual tablet    NITROSTAT    25 tablet    Place 1 tablet (0.4 mg) under the tongue every 5 minutes as needed for chest pain       omeprazole 40 MG capsule    priLOSEC    180 capsule    Take 1 capsule (40 mg) by mouth every 12 hours       order for DME     1 Device    Equipment being ordered: immobilizer       sertraline 25 MG tablet    ZOLOFT    90 tablet    Take 1 tablet (25 mg) by mouth daily       sucralfate 1 GM tablet    CARAFATE    360 tablet    Take 1 tablet (1 g) by mouth 4 times daily May dissolve in 2 tsp water.

## 2017-05-31 NOTE — ANESTHESIA PREPROCEDURE EVALUATION
Anesthesia Evaluation     . Pt has had prior anesthetic. Type: General and MAC    No history of anesthetic complications          ROS/MED HX    ENT/Pulmonary:     (+)Mild Persistent asthma Treatment: Inhaler prn and Inhaled steroids,  , . .    Neurologic:  - neg neurologic ROS     Cardiovascular: Comment: CAD (coronary artery disease) 05/14/2012       Priority: High      NSTEMI May 2012      EF 35%  PCI to mid-LAD vessel and first diagonal.  Plan to return at end of May 2012 for staged stenting of RCA  Rx Effient for one year, start Crestor    Pt. States she has 6 stents, outside records not available        (+) Dyslipidemia, --CAD, -past MI,-stent,6 . : . . . :. . Previous cardiac testing Echodate:results:Interpretation Summary  Normal stress echocardiogram. No stress induced regional wall motion   abnormalities. Normal resting LV function with EF of approximately 60-65%;   normal response to exercise with increase to approximately 70-75%.   Appropriate decrease of LV size and volume. No ECG evidence of ischemia. No   subjective evidence of ischemia. Normal functional capacity. No significant   valvular disease noted on routine screening color flow Doppler and pulsed   Doppler examination  PatientHeight: 65 in  PatientWeight: 170 lbs  SystolicPressure: 144 mmHg  DiastolicPressure: 86 mmHg  HeartRate: 53 bpm  BSA 1.8 m^2     Stress Findings  Maximum workload 125 sands.  Peak MVO2 19.4 ml/kg/min .  Percent predicted  MVO2 77 %.  RPP 22,576.  Patient was given 3ml of Optison.  Optison Expiration  5/28/14 .  Optison Lot # [id number] .     Aortic Valve  The aortic valve is normal in structure and function.     Mitral Valve  The mitral valve is normal in structure and function.     Tricuspid Valve  The tricuspid valve is normal.     Procedure:  Stress Echo Bike with two dimensional, color and spectral Doppler performed.  Contrast Optison.     MMode 2D Measurements & Calculations  asc Aorta: 3.3 cm  Doppler Measurements &  Calculations  MV E point: 74 cm/sec  MV A point: 70 cm/sec  MV E/A: 1.1   Ao V2 max: 116 cm/sec  Ao max P.0 mmHg     Stress Results  Protocol:  Bike Stress Echo    Target HR: 133 bpm            Maximum Predicted HR: 157 bpm      Stage        Duration(mm:ss)  HR(bpm)   BP        DOSE  Comment     Baseline       00:00          53        144/86                      Peak           07:00          136       166/95                          Stress Duration: 07:00 mm:ss  Recovery Time: 00:00 mm:ss    Maximum Stress HR: 136 bpm    METS:       Interpreting Physician:  Daisy Leon,  electronically signed on   04- 12:55:46date: results: date: results: date: results:          METS/Exercise Tolerance: Comment: Limited by knee pain 4 - Raking leaves, gardening   Hematologic:  - neg hematologic  ROS       Musculoskeletal:   (+) arthritis, , , -       GI/Hepatic:     (+) GERD Asymptomatic on medication, hiatal hernia, Other GI/Hepatic nasal/colon polyps      Renal/Genitourinary:     (+) Nephrolithiasis ,       Endo:     (+) Obesity, .      Psychiatric:     (+) psychiatric history anxiety      Infectious Disease:  - neg infectious disease ROS       Malignancy:   (+) Malignancy History of Skin          Other:    - neg other ROS                 Physical Exam  Normal systems: cardiovascular and pulmonary    Airway   Mallampati: II  TM distance: >3 FB  Neck ROM: full    Dental   (+) upper dentures and lower dentures    Cardiovascular   Rhythm and rate: regular and normal      Pulmonary    breath sounds clear to auscultation                    Anesthesia Plan      History & Physical Review  History and physical reviewed and following examination; no interval change.    ASA Status:  3 .    NPO Status:  > 8 hours    Plan for Spinal     SAB + Right adductor canal block post-op      Postoperative Care  Postoperative pain management:  IV analgesics, Oral pain medications and Peripheral nerve block (Single Shot).       Consents  Anesthetic plan, risks, benefits and alternatives discussed with:  Patient..                          .

## 2017-06-01 ENCOUNTER — APPOINTMENT (OUTPATIENT)
Dept: GENERAL RADIOLOGY | Facility: CLINIC | Age: 67
DRG: 470 | End: 2017-06-01
Attending: ORTHOPAEDIC SURGERY
Payer: MEDICARE

## 2017-06-01 ENCOUNTER — HOSPITAL ENCOUNTER (INPATIENT)
Facility: CLINIC | Age: 67
LOS: 3 days | Discharge: HOME HEALTH | DRG: 470 | End: 2017-06-04
Attending: ORTHOPAEDIC SURGERY | Admitting: ORTHOPAEDIC SURGERY
Payer: MEDICARE

## 2017-06-01 ENCOUNTER — ANESTHESIA (OUTPATIENT)
Dept: SURGERY | Facility: CLINIC | Age: 67
DRG: 470 | End: 2017-06-01
Payer: MEDICARE

## 2017-06-01 DIAGNOSIS — D64.9 POSTOPERATIVE ANEMIA: ICD-10-CM

## 2017-06-01 DIAGNOSIS — Z96.651 STATUS POST TOTAL RIGHT KNEE REPLACEMENT: Primary | ICD-10-CM

## 2017-06-01 DIAGNOSIS — I25.10 CORONARY ARTERY DISEASE INVOLVING NATIVE CORONARY ARTERY OF NATIVE HEART WITHOUT ANGINA PECTORIS: ICD-10-CM

## 2017-06-01 PROBLEM — Z96.659 S/P TOTAL KNEE ARTHROPLASTY: Status: ACTIVE | Noted: 2017-06-01

## 2017-06-01 PROCEDURE — 25000132 ZZH RX MED GY IP 250 OP 250 PS 637: Mod: GY | Performed by: ORTHOPAEDIC SURGERY

## 2017-06-01 PROCEDURE — 25000132 ZZH RX MED GY IP 250 OP 250 PS 637: Mod: GY | Performed by: PHYSICIAN ASSISTANT

## 2017-06-01 PROCEDURE — 25000128 H RX IP 250 OP 636: Performed by: NURSE ANESTHETIST, CERTIFIED REGISTERED

## 2017-06-01 PROCEDURE — 40000986 XR KNEE PORT RT 1/2 VW: Mod: RT

## 2017-06-01 PROCEDURE — A9270 NON-COVERED ITEM OR SERVICE: HCPCS | Mod: GY | Performed by: PHYSICIAN ASSISTANT

## 2017-06-01 PROCEDURE — 37000009 ZZH ANESTHESIA TECHNICAL FEE, EACH ADDTL 15 MIN: Performed by: ORTHOPAEDIC SURGERY

## 2017-06-01 PROCEDURE — 40000306 ZZH STATISTIC PRE PROC ASSESS II: Performed by: ORTHOPAEDIC SURGERY

## 2017-06-01 PROCEDURE — 25800025 ZZH RX 258: Performed by: ORTHOPAEDIC SURGERY

## 2017-06-01 PROCEDURE — 36000063 ZZH SURGERY LEVEL 4 EA 15 ADDTL MIN: Performed by: ORTHOPAEDIC SURGERY

## 2017-06-01 PROCEDURE — A9270 NON-COVERED ITEM OR SERVICE: HCPCS | Mod: GY | Performed by: ORTHOPAEDIC SURGERY

## 2017-06-01 PROCEDURE — 25000125 ZZHC RX 250: Performed by: NURSE ANESTHETIST, CERTIFIED REGISTERED

## 2017-06-01 PROCEDURE — 27810169 ZZH OR IMPLANT GENERAL: Performed by: ORTHOPAEDIC SURGERY

## 2017-06-01 PROCEDURE — 25000125 ZZHC RX 250: Performed by: ORTHOPAEDIC SURGERY

## 2017-06-01 PROCEDURE — 36000093 ZZH SURGERY LEVEL 4 1ST 30 MIN: Performed by: ORTHOPAEDIC SURGERY

## 2017-06-01 PROCEDURE — 25000128 H RX IP 250 OP 636: Performed by: PHYSICIAN ASSISTANT

## 2017-06-01 PROCEDURE — 71000014 ZZH RECOVERY PHASE 1 LEVEL 2 FIRST HR: Performed by: ORTHOPAEDIC SURGERY

## 2017-06-01 PROCEDURE — 37000011 ZZH ANESTHESIA WARD SERVICE: Performed by: NURSE ANESTHETIST, CERTIFIED REGISTERED

## 2017-06-01 PROCEDURE — 12000007 ZZH R&B INTERMEDIATE

## 2017-06-01 PROCEDURE — A9270 NON-COVERED ITEM OR SERVICE: HCPCS | Mod: GY | Performed by: NURSE ANESTHETIST, CERTIFIED REGISTERED

## 2017-06-01 PROCEDURE — 0SRC0J9 REPLACEMENT OF RIGHT KNEE JOINT WITH SYNTHETIC SUBSTITUTE, CEMENTED, OPEN APPROACH: ICD-10-PCS | Performed by: ORTHOPAEDIC SURGERY

## 2017-06-01 PROCEDURE — 37000008 ZZH ANESTHESIA TECHNICAL FEE, 1ST 30 MIN: Performed by: ORTHOPAEDIC SURGERY

## 2017-06-01 PROCEDURE — 25000125 ZZHC RX 250: Performed by: PHYSICIAN ASSISTANT

## 2017-06-01 PROCEDURE — S0077 INJECTION, CLINDAMYCIN PHOSP: HCPCS | Performed by: PHYSICIAN ASSISTANT

## 2017-06-01 PROCEDURE — C1776 JOINT DEVICE (IMPLANTABLE): HCPCS | Performed by: ORTHOPAEDIC SURGERY

## 2017-06-01 PROCEDURE — 25000132 ZZH RX MED GY IP 250 OP 250 PS 637: Mod: GY | Performed by: NURSE ANESTHETIST, CERTIFIED REGISTERED

## 2017-06-01 PROCEDURE — 27210794 ZZH OR GENERAL SUPPLY STERILE: Performed by: ORTHOPAEDIC SURGERY

## 2017-06-01 PROCEDURE — 27210995 ZZH RX 272: Performed by: ORTHOPAEDIC SURGERY

## 2017-06-01 PROCEDURE — 25000128 H RX IP 250 OP 636: Performed by: ORTHOPAEDIC SURGERY

## 2017-06-01 DEVICE — IMPLANTABLE DEVICE: Type: IMPLANTABLE DEVICE | Site: KNEE | Status: FUNCTIONAL

## 2017-06-01 DEVICE — IMP TIB BASE JJ ATTUNE RP SZ6 CEM 150610006: Type: IMPLANTABLE DEVICE | Site: KNEE | Status: FUNCTIONAL

## 2017-06-01 DEVICE — BONE CEMENT PALACOS 00-1112-140-01: Type: IMPLANTABLE DEVICE | Site: KNEE | Status: FUNCTIONAL

## 2017-06-01 RX ORDER — CLINDAMYCIN PHOSPHATE 900 MG/50ML
900 INJECTION, SOLUTION INTRAVENOUS SEE ADMIN INSTRUCTIONS
Status: DISCONTINUED | OUTPATIENT
Start: 2017-06-01 | End: 2017-06-01 | Stop reason: HOSPADM

## 2017-06-01 RX ORDER — SODIUM CHLORIDE, SODIUM LACTATE, POTASSIUM CHLORIDE, CALCIUM CHLORIDE 600; 310; 30; 20 MG/100ML; MG/100ML; MG/100ML; MG/100ML
INJECTION, SOLUTION INTRAVENOUS CONTINUOUS
Status: DISCONTINUED | OUTPATIENT
Start: 2017-06-01 | End: 2017-06-01 | Stop reason: HOSPADM

## 2017-06-01 RX ORDER — LIDOCAINE 40 MG/G
CREAM TOPICAL
Status: DISCONTINUED | OUTPATIENT
Start: 2017-06-01 | End: 2017-06-04 | Stop reason: HOSPADM

## 2017-06-01 RX ORDER — ACETAMINOPHEN 325 MG/1
975 TABLET ORAL ONCE
Status: COMPLETED | OUTPATIENT
Start: 2017-06-01 | End: 2017-06-01

## 2017-06-01 RX ORDER — AMOXICILLIN 250 MG
1-2 CAPSULE ORAL 2 TIMES DAILY
Status: DISCONTINUED | OUTPATIENT
Start: 2017-06-01 | End: 2017-06-04 | Stop reason: HOSPADM

## 2017-06-01 RX ORDER — FENTANYL CITRATE 50 UG/ML
INJECTION, SOLUTION INTRAMUSCULAR; INTRAVENOUS PRN
Status: DISCONTINUED | OUTPATIENT
Start: 2017-06-01 | End: 2017-06-01

## 2017-06-01 RX ORDER — LORAZEPAM 0.5 MG/1
0.5-1 TABLET ORAL EVERY 8 HOURS PRN
Status: DISCONTINUED | OUTPATIENT
Start: 2017-06-01 | End: 2017-06-04 | Stop reason: HOSPADM

## 2017-06-01 RX ORDER — HYDROMORPHONE HYDROCHLORIDE 1 MG/ML
.3-.5 INJECTION, SOLUTION INTRAMUSCULAR; INTRAVENOUS; SUBCUTANEOUS EVERY 5 MIN PRN
Status: DISCONTINUED | OUTPATIENT
Start: 2017-06-01 | End: 2017-06-01 | Stop reason: HOSPADM

## 2017-06-01 RX ORDER — MONTELUKAST SODIUM 10 MG/1
10 TABLET ORAL AT BEDTIME
Status: DISCONTINUED | OUTPATIENT
Start: 2017-06-01 | End: 2017-06-04 | Stop reason: HOSPADM

## 2017-06-01 RX ORDER — HYDROMORPHONE HYDROCHLORIDE 1 MG/ML
INJECTION, SOLUTION INTRAMUSCULAR; INTRAVENOUS; SUBCUTANEOUS
Status: DISCONTINUED
Start: 2017-06-01 | End: 2017-06-01 | Stop reason: HOSPADM

## 2017-06-01 RX ORDER — NALOXONE HYDROCHLORIDE 0.4 MG/ML
.1-.4 INJECTION, SOLUTION INTRAMUSCULAR; INTRAVENOUS; SUBCUTANEOUS
Status: DISCONTINUED | OUTPATIENT
Start: 2017-06-01 | End: 2017-06-04 | Stop reason: HOSPADM

## 2017-06-01 RX ORDER — ONDANSETRON 4 MG/1
4 TABLET, ORALLY DISINTEGRATING ORAL EVERY 30 MIN PRN
Status: DISCONTINUED | OUTPATIENT
Start: 2017-06-01 | End: 2017-06-01 | Stop reason: HOSPADM

## 2017-06-01 RX ORDER — ONDANSETRON 2 MG/ML
4 INJECTION INTRAMUSCULAR; INTRAVENOUS EVERY 30 MIN PRN
Status: DISCONTINUED | OUTPATIENT
Start: 2017-06-01 | End: 2017-06-01 | Stop reason: HOSPADM

## 2017-06-01 RX ORDER — METOPROLOL SUCCINATE 25 MG/1
25 TABLET, EXTENDED RELEASE ORAL DAILY
Status: DISCONTINUED | OUTPATIENT
Start: 2017-06-02 | End: 2017-06-03

## 2017-06-01 RX ORDER — OXYCODONE HYDROCHLORIDE 5 MG/1
5-10 TABLET ORAL EVERY 4 HOURS PRN
Status: DISCONTINUED | OUTPATIENT
Start: 2017-06-01 | End: 2017-06-02

## 2017-06-01 RX ORDER — ATORVASTATIN CALCIUM 20 MG/1
40 TABLET, FILM COATED ORAL DAILY
Status: DISCONTINUED | OUTPATIENT
Start: 2017-06-02 | End: 2017-06-04 | Stop reason: HOSPADM

## 2017-06-01 RX ORDER — ONDANSETRON 2 MG/ML
4 INJECTION INTRAMUSCULAR; INTRAVENOUS EVERY 6 HOURS PRN
Status: DISCONTINUED | OUTPATIENT
Start: 2017-06-01 | End: 2017-06-04 | Stop reason: HOSPADM

## 2017-06-01 RX ORDER — FLUTICASONE PROPIONATE 50 MCG
2 SPRAY, SUSPENSION (ML) NASAL DAILY
Status: DISCONTINUED | OUTPATIENT
Start: 2017-06-02 | End: 2017-06-04 | Stop reason: HOSPADM

## 2017-06-01 RX ORDER — FLUTICASONE PROPIONATE 110 UG/1
2 AEROSOL, METERED RESPIRATORY (INHALATION) 2 TIMES DAILY
Status: DISCONTINUED | OUTPATIENT
Start: 2017-06-01 | End: 2017-06-01 | Stop reason: CLARIF

## 2017-06-01 RX ORDER — PROPOFOL 10 MG/ML
INJECTION, EMULSION INTRAVENOUS CONTINUOUS PRN
Status: DISCONTINUED | OUTPATIENT
Start: 2017-06-01 | End: 2017-06-01

## 2017-06-01 RX ORDER — FENTANYL CITRATE 50 UG/ML
25-50 INJECTION, SOLUTION INTRAMUSCULAR; INTRAVENOUS
Status: DISCONTINUED | OUTPATIENT
Start: 2017-06-01 | End: 2017-06-01 | Stop reason: HOSPADM

## 2017-06-01 RX ORDER — HYDROXYZINE HYDROCHLORIDE 25 MG/1
25 TABLET, FILM COATED ORAL EVERY 6 HOURS PRN
Status: DISCONTINUED | OUTPATIENT
Start: 2017-06-01 | End: 2017-06-02

## 2017-06-01 RX ORDER — ALBUTEROL SULFATE 90 UG/1
2 AEROSOL, METERED RESPIRATORY (INHALATION) EVERY 4 HOURS PRN
Status: DISCONTINUED | OUTPATIENT
Start: 2017-06-01 | End: 2017-06-04 | Stop reason: HOSPADM

## 2017-06-01 RX ORDER — KETOROLAC TROMETHAMINE 30 MG/ML
30 INJECTION, SOLUTION INTRAMUSCULAR; INTRAVENOUS
Status: DISCONTINUED | OUTPATIENT
Start: 2017-06-01 | End: 2017-06-01 | Stop reason: HOSPADM

## 2017-06-01 RX ORDER — ROPIVACAINE HYDROCHLORIDE 5 MG/ML
INJECTION, SOLUTION EPIDURAL; INFILTRATION; PERINEURAL PRN
Status: DISCONTINUED | OUTPATIENT
Start: 2017-06-01 | End: 2017-06-01

## 2017-06-01 RX ORDER — SUCRALFATE 1 G/1
1 TABLET ORAL 4 TIMES DAILY
Status: DISCONTINUED | OUTPATIENT
Start: 2017-06-01 | End: 2017-06-04 | Stop reason: HOSPADM

## 2017-06-01 RX ORDER — CLINDAMYCIN PHOSPHATE 900 MG/50ML
900 INJECTION, SOLUTION INTRAVENOUS EVERY 8 HOURS
Status: COMPLETED | OUTPATIENT
Start: 2017-06-02 | End: 2017-06-02

## 2017-06-01 RX ORDER — ACETAMINOPHEN 325 MG/1
650 TABLET ORAL EVERY 4 HOURS PRN
Status: DISCONTINUED | OUTPATIENT
Start: 2017-06-04 | End: 2017-06-04 | Stop reason: HOSPADM

## 2017-06-01 RX ORDER — HYDROMORPHONE HYDROCHLORIDE 1 MG/ML
.3-.5 INJECTION, SOLUTION INTRAMUSCULAR; INTRAVENOUS; SUBCUTANEOUS
Status: DISCONTINUED | OUTPATIENT
Start: 2017-06-01 | End: 2017-06-04 | Stop reason: HOSPADM

## 2017-06-01 RX ORDER — GLYCOPYRROLATE 0.2 MG/ML
INJECTION, SOLUTION INTRAMUSCULAR; INTRAVENOUS PRN
Status: DISCONTINUED | OUTPATIENT
Start: 2017-06-01 | End: 2017-06-01

## 2017-06-01 RX ORDER — CLINDAMYCIN PHOSPHATE 900 MG/50ML
900 INJECTION, SOLUTION INTRAVENOUS
Status: COMPLETED | OUTPATIENT
Start: 2017-06-01 | End: 2017-06-01

## 2017-06-01 RX ORDER — DEXAMETHASONE SODIUM PHOSPHATE 4 MG/ML
INJECTION, SOLUTION INTRA-ARTICULAR; INTRALESIONAL; INTRAMUSCULAR; INTRAVENOUS; SOFT TISSUE PRN
Status: DISCONTINUED | OUTPATIENT
Start: 2017-06-01 | End: 2017-06-01

## 2017-06-01 RX ORDER — BUPIVACAINE HYDROCHLORIDE 7.5 MG/ML
INJECTION, SOLUTION INTRASPINAL PRN
Status: DISCONTINUED | OUTPATIENT
Start: 2017-06-01 | End: 2017-06-01

## 2017-06-01 RX ORDER — LIDOCAINE 40 MG/G
CREAM TOPICAL
Status: DISCONTINUED | OUTPATIENT
Start: 2017-06-01 | End: 2017-06-01 | Stop reason: HOSPADM

## 2017-06-01 RX ORDER — LISINOPRIL 5 MG/1
5 TABLET ORAL DAILY
Status: DISCONTINUED | OUTPATIENT
Start: 2017-06-02 | End: 2017-06-03

## 2017-06-01 RX ORDER — SERTRALINE HYDROCHLORIDE 25 MG/1
25 TABLET, FILM COATED ORAL DAILY
Status: DISCONTINUED | OUTPATIENT
Start: 2017-06-02 | End: 2017-06-04 | Stop reason: HOSPADM

## 2017-06-01 RX ORDER — DEXAMETHASONE SODIUM PHOSPHATE 10 MG/ML
10 INJECTION, SOLUTION INTRAMUSCULAR; INTRAVENOUS
Status: DISCONTINUED | OUTPATIENT
Start: 2017-06-01 | End: 2017-06-01 | Stop reason: HOSPADM

## 2017-06-01 RX ORDER — ACETAMINOPHEN 325 MG/1
975 TABLET ORAL EVERY 8 HOURS
Status: COMPLETED | OUTPATIENT
Start: 2017-06-01 | End: 2017-06-04

## 2017-06-01 RX ORDER — CYCLOBENZAPRINE HCL 5 MG
5 TABLET ORAL 3 TIMES DAILY PRN
Status: DISCONTINUED | OUTPATIENT
Start: 2017-06-01 | End: 2017-06-02

## 2017-06-01 RX ORDER — FENTANYL CITRATE 50 UG/ML
INJECTION, SOLUTION INTRAMUSCULAR; INTRAVENOUS
Status: DISCONTINUED
Start: 2017-06-01 | End: 2017-06-01 | Stop reason: HOSPADM

## 2017-06-01 RX ORDER — ALBUTEROL SULFATE 0.83 MG/ML
2.5 SOLUTION RESPIRATORY (INHALATION) EVERY 4 HOURS PRN
Status: DISCONTINUED | OUTPATIENT
Start: 2017-06-01 | End: 2017-06-01 | Stop reason: HOSPADM

## 2017-06-01 RX ORDER — LIDOCAINE HCL/EPINEPHRINE/PF 2%-1:200K
VIAL (ML) INJECTION PRN
Status: DISCONTINUED | OUTPATIENT
Start: 2017-06-01 | End: 2017-06-01

## 2017-06-01 RX ORDER — HYDRALAZINE HYDROCHLORIDE 20 MG/ML
2.5-5 INJECTION INTRAMUSCULAR; INTRAVENOUS EVERY 10 MIN PRN
Status: DISCONTINUED | OUTPATIENT
Start: 2017-06-01 | End: 2017-06-01 | Stop reason: HOSPADM

## 2017-06-01 RX ORDER — ONDANSETRON 4 MG/1
4 TABLET, ORALLY DISINTEGRATING ORAL EVERY 6 HOURS PRN
Status: DISCONTINUED | OUTPATIENT
Start: 2017-06-01 | End: 2017-06-04 | Stop reason: HOSPADM

## 2017-06-01 RX ORDER — MEPERIDINE HYDROCHLORIDE 25 MG/ML
12.5 INJECTION INTRAMUSCULAR; INTRAVENOUS; SUBCUTANEOUS EVERY 5 MIN PRN
Status: DISCONTINUED | OUTPATIENT
Start: 2017-06-01 | End: 2017-06-01 | Stop reason: HOSPADM

## 2017-06-01 RX ORDER — NITROGLYCERIN 0.4 MG/1
0.4 TABLET SUBLINGUAL EVERY 5 MIN PRN
Status: DISCONTINUED | OUTPATIENT
Start: 2017-06-01 | End: 2017-06-04 | Stop reason: HOSPADM

## 2017-06-01 RX ADMIN — ROPIVACAINE HYDROCHLORIDE 10 ML: 5 INJECTION, SOLUTION EPIDURAL; INFILTRATION; PERINEURAL at 19:27

## 2017-06-01 RX ADMIN — GLYCOPYRROLATE 0.2 MG: 0.2 INJECTION, SOLUTION INTRAMUSCULAR; INTRAVENOUS at 17:30

## 2017-06-01 RX ADMIN — CYCLOBENZAPRINE 5 MG: 5 TABLET, FILM COATED ORAL at 21:38

## 2017-06-01 RX ADMIN — SENNOSIDES AND DOCUSATE SODIUM 2 TABLET: 8.6; 5 TABLET ORAL at 22:20

## 2017-06-01 RX ADMIN — OXYCODONE HYDROCHLORIDE 5 MG: 5 TABLET ORAL at 22:20

## 2017-06-01 RX ADMIN — FENTANYL CITRATE 50 MCG: 50 INJECTION, SOLUTION INTRAMUSCULAR; INTRAVENOUS at 20:10

## 2017-06-01 RX ADMIN — HYDROMORPHONE HYDROCHLORIDE 0.5 MG: 1 INJECTION, SOLUTION INTRAMUSCULAR; INTRAVENOUS; SUBCUTANEOUS at 21:39

## 2017-06-01 RX ADMIN — FENTANYL CITRATE 100 MCG: 50 INJECTION, SOLUTION INTRAMUSCULAR; INTRAVENOUS at 17:30

## 2017-06-01 RX ADMIN — EPINEPHRINE 0.2 MG: 1 INJECTION, SOLUTION INTRAMUSCULAR; SUBCUTANEOUS at 17:33

## 2017-06-01 RX ADMIN — TRANEXAMIC ACID 1 G: 100 INJECTION, SOLUTION INTRAVENOUS at 18:57

## 2017-06-01 RX ADMIN — MONTELUKAST 10 MG: 10 TABLET, FILM COATED ORAL at 21:38

## 2017-06-01 RX ADMIN — LORAZEPAM 0.5 MG: 0.5 TABLET ORAL at 22:21

## 2017-06-01 RX ADMIN — SODIUM CHLORIDE, POTASSIUM CHLORIDE, SODIUM LACTATE AND CALCIUM CHLORIDE: 600; 310; 30; 20 INJECTION, SOLUTION INTRAVENOUS at 17:46

## 2017-06-01 RX ADMIN — SODIUM CHLORIDE, POTASSIUM CHLORIDE, SODIUM LACTATE AND CALCIUM CHLORIDE: 600; 310; 30; 20 INJECTION, SOLUTION INTRAVENOUS at 14:30

## 2017-06-01 RX ADMIN — HYDROMORPHONE HYDROCHLORIDE 0.5 MG: 1 INJECTION, SOLUTION INTRAMUSCULAR; INTRAVENOUS; SUBCUTANEOUS at 23:37

## 2017-06-01 RX ADMIN — BUPIVACAINE HYDROCHLORIDE IN DEXTROSE 1.7 ML: 7.5 INJECTION, SOLUTION SUBARACHNOID at 17:33

## 2017-06-01 RX ADMIN — TRANEXAMIC ACID 1 G: 100 INJECTION, SOLUTION INTRAVENOUS at 17:29

## 2017-06-01 RX ADMIN — ROPIVACAINE HYDROCHLORIDE 20 ML: 5 INJECTION, SOLUTION EPIDURAL; INFILTRATION; PERINEURAL at 19:26

## 2017-06-01 RX ADMIN — HYDROMORPHONE HYDROCHLORIDE 0.5 MG: 1 INJECTION, SOLUTION INTRAMUSCULAR; INTRAVENOUS; SUBCUTANEOUS at 20:17

## 2017-06-01 RX ADMIN — MIDAZOLAM HYDROCHLORIDE 1 MG: 1 INJECTION, SOLUTION INTRAMUSCULAR; INTRAVENOUS at 17:32

## 2017-06-01 RX ADMIN — PROPOFOL 100 MCG/KG/MIN: 10 INJECTION, EMULSION INTRAVENOUS at 17:37

## 2017-06-01 RX ADMIN — CLINDAMYCIN PHOSPHATE 900 MG: 18 INJECTION, SOLUTION INTRAVENOUS at 17:26

## 2017-06-01 RX ADMIN — DEXAMETHASONE SODIUM PHOSPHATE 10 MG: 4 INJECTION, SOLUTION INTRA-ARTICULAR; INTRALESIONAL; INTRAMUSCULAR; INTRAVENOUS; SOFT TISSUE at 17:32

## 2017-06-01 RX ADMIN — ACETAMINOPHEN 975 MG: 325 TABLET, FILM COATED ORAL at 14:00

## 2017-06-01 RX ADMIN — LIDOCAINE HYDROCHLORIDE 1 ML: 10 INJECTION, SOLUTION INFILTRATION; PERINEURAL at 14:30

## 2017-06-01 RX ADMIN — SUCRALFATE 1 G: 1 TABLET ORAL at 23:40

## 2017-06-01 RX ADMIN — OMEPRAZOLE 40 MG: 20 CAPSULE, DELAYED RELEASE ORAL at 22:20

## 2017-06-01 RX ADMIN — ACETAMINOPHEN 975 MG: 325 TABLET, FILM COATED ORAL at 21:37

## 2017-06-01 RX ADMIN — LIDOCAINE HYDROCHLORIDE,EPINEPHRINE BITARTRATE 5 ML: 20; .005 INJECTION, SOLUTION EPIDURAL; INFILTRATION; INTRACAUDAL; PERINEURAL at 19:23

## 2017-06-01 RX ADMIN — MIDAZOLAM HYDROCHLORIDE 2 MG: 1 INJECTION, SOLUTION INTRAMUSCULAR; INTRAVENOUS at 17:29

## 2017-06-01 RX ADMIN — MIDAZOLAM HYDROCHLORIDE 2 MG: 1 INJECTION, SOLUTION INTRAMUSCULAR; INTRAVENOUS at 17:31

## 2017-06-01 ASSESSMENT — ASTHMA QUESTIONNAIRES: ACT_TOTALSCORE: 25

## 2017-06-01 NOTE — IP AVS SNAPSHOT
MRN:[id number]                      After Visit Summary   6/1/2017    Anna Dahl    MRN: [id number]           Thank you!     Thank you for choosing [facility] for your care. Our goal is always to provide you with excellent care. Hearing back from our patients is one way we can continue to improve our services. Please take a few minutes to complete the written survey that you may receive in the mail after you visit with us. Thank you!        Patient Information     Date Of Birth          1950        Designated Caregiver       Most Recent Value    Caregiver    Will someone help with your care after discharge? yes    Name of designated caregiver Jung    Phone number of caregiver [phone]    Caregiver address same as pt      About your hospital stay     You were admitted on:  June 1, 2017 You last received care in the:  [facility]    You were discharged on:  June 4, 2017        Reason for your hospital stay       Right total knee arthroplasty                  Who to Call     For medical emergencies, please call 911.  For non-urgent questions about your medical care, please call your primary care provider or clinic, [phone]  For questions related to your surgery, please call your surgery clinic        Attending Provider     Provider Specialty    Colin Krause MD Orthopedics       Primary Care Provider Office Phone # Fax #    Nadia Vivi Ramirez [phone]       When to contact your care team       Call your Orthopedic surgeon at [facility]  if you have any of the following: temperature greater than 100.4,  increased shortness of breath, increased drainage, increased swelling or increased pain.                  After Care Instructions     Activity       Your activity upon discharge: activity as tolerated and no driving until off narcotic pain medication            Diet       Follow this diet upon discharge: Orders Placed This  Encounter      Advance Diet as Tolerated: Regular Diet Adult              Wound care and dressings       Instructions to care for your wound at home: as directed, daily dressing changes, ice to area for comfort, keep wound clean and dry and may get incision wet in shower but do not soak or scrub.                  Follow-up Appointments     Follow-up and recommended labs and tests       Follow up with  Suzette Salcido PA-C , at (location with clinic name or city) [facility], within 2 weeks to evaluate after surgery and for hospital follow- up.  [phone]     Follow up with Primary MD in one week with hemoglobin lab draw.                  Your next 10 appointments already scheduled     Jun 08, 2017  2:40 PM CDT   Office Visit with Nadia Ramirez MD   [facility] ([facility])    [address] [address] [phone]           Bring a current list of meds and any records pertaining to this visit.  For Physicals, please bring immunization records and any forms needing to be filled out.  Please arrive 10 minutes early to complete paperwork.              Additional Services     Home Care PT Referral for Hospital Discharge       PT to eval and treat    Your provider has ordered home care - physical therapy. If you have not been contacted within 2 days of your discharge please call the department phone number listed on the top of this document.            Home Care Referral       Your provider has referred you to: [facility]: [facility] [phone]   [email or web address]    Extended Emergency Contact Information  Primary Emergency Contact: FAVIAN GONZALEZ  Address: [address] [place] [address] [address]  Home Phone: [phone]  Mobile Phone: [phone]  Relation: Spouse  Secondary Emergency Contact: ISAIAS GONZALEZ [place]  [address] [address]  Home Phone: [phone]  Mobile Phone: [phone]  Relation: Son    Patient Anticipated Discharge Date: 1-2 days   RN, PT, HHA to begin 24 - 48 hours after discharge.  PLEASE EVALUATE AND TREAT (Evaluation timeline is 24 - 48 hrs. Please call if there is need for a variance to this timeline).    REASON FOR REFERRAL: Assessment & Treatment: PT and RN    ADDITIONAL SERVICES NEEDED: None    OTHER PERTINENT INFORMATION: Patient was last seen by provider on 6/2/17 for TKA.    Current Outpatient Prescriptions:  oxyCODONE (ROXICODONE) 5 MG IR tablet, Take 1-2 tablets (5-10 mg) by mouth every 4 hours as needed for moderate to severe pain, Disp: 60 tablet, Rfl: 0  aspirin  MG EC tablet, Take 1 tablet (325 mg) by mouth daily, Disp: 40 tablet, Rfl: 0  hydrOXYzine (ATARAX) 25 MG tablet, Take 1 tablet (25 mg) by mouth every 6 hours as needed for itching or other (adjunct pain), Disp: 60 tablet, Rfl: 0  senna-docusate (SENOKOT-S;PERICOLACE) 8.6-50 MG per tablet, Take 1-2 tablets by mouth 2 times daily, Disp: 100 tablet, Rfl: 1  order for DME, Equipment being ordered: Walker Wheels ()  Treatment Diagnosis: s/p TKA, Disp: 1 Units, Rfl: 0      Patient Active Problem List:     Nasal polyp     Hiatal hernia     Colon polyps     GERD (gastroesophageal reflux disease)     Hyperlipidemia LDL goal <100     Advanced directives, counseling/discussion     Mild intermittent asthma with exacerbation     Chronic sinusitis     Non-ST elevation myocardial infarction (NSTEMI), initial care episode (H)     CAD (coronary artery disease)     BCC (basal cell carcinoma), face     Anxiety     Osteoporosis     S/P total knee arthroplasty     Elevated glucose      Documentation of Face to Face and Certification for Home Health Services    I certify that patientAnna is under my care and that I, or a Nurse Practitioner or Physician's Assistant working with me, had a face-to-face encounter that meets the  physician face-to-face encounter requirements with this patient on: 6/2/2017.    This encounter with the patient was in whole, or in part, for the following medical condition, which is the primary reason for Home Health Care: TKA.    I certify that, based on my findings, the following services are medically necessary Home Health Services: Nursing and Physical Therapy    My clinical findings support the need for the above services because: Physical Therapy Services are needed to assess and treat the following functional impairments: TKA.    Further, I certify that my clinical findings support that this patient is homebound (i.e. absences from home require considerable and taxing effort and are for medical reasons or [employer] services or infrequently or of short duration when for other reasons) because: Requires assistance of another person or specialized equipment to access medical services because patient: Requires supervision of another for safe transfer..    Based on the above findings, I certify that this patient is confined to the home and needs intermittent skilled nursing care, physical therapy and/or speech therapy.  The patient is under my care, and I have initiated the establishment of the plan of care.  This patient will be followed by a physician who will periodically review the plan of care.    Physician/Provider to provide follow up care: Nadia Ramirez    Responsible RAEANN certified Physician at time of discharge: Dr. Krause    Please be aware that coverage of these services is subject to the terms and limitations of your health insurance plan.  Call member services at your health plan with any benefit or coverage questions.            Physical Therapy Referral       *This therapy referral will be filtered to a centralized scheduling office at [facility] and the patient will receive a call to schedule an appointment at a [facility] location most convenient for them. *  "    [facility] provides Physical Therapy evaluation and treatment and many specialty services across the [facility] system.  If requesting a specialty program, please choose from the list below.    If you have not heard from the scheduling office within 2 business days, please call [phone] for all locations, with the exception of [place], please call [phone].  Treatment: Evaluation & Treatment  Special Instructions/Modalities: none  Special Programs: None    Please be aware that coverage of these services is subject to the terms and limitations of your health insurance plan.  Call member services at your health plan with any benefit or coverage questions.      **Note to Provider:  If you are referring outside of [facility] for the therapy appointment, please list the name of the location in the \"special instructions\" above, print the referral and give to the patient to schedule the appointment.                  Future tests that were ordered for you     Hemoglobin       Last Lab Result: Hemoglobin (g/dL)       Date                     Value                 06/04/2017               8.9 (L)          ----------                  Further instructions from your care team       Surgical follow up   1.  Follow up with Suzette Salcido PA-C.  in 2 weeks for post op check and x rays as scheduled.  Call [phone] if appointment needed or questions  2.  Use pain medication as directed  3.  Keep incision clean, covered and dry until post op appointment.  You may shower and get incision wet if no drainage is present. You may change your dressing as needed.  No additional adhesives to be put on knee (including bandages).  Only use dry  guaze over [place] glue tape and then apply 4 inch ACE to hold dressings in place.   4.  Continue physical therapy as soon as possible.  You will need to call a therapy department of your choice to arrange future appointments.  Your order for physical therapy is included in " "your discharge paperwork.   5.  Take Aspirin 325 mg  daily  for 42 days for anticoagulation        Medical follow up:  Start taking daily iron supplements.  Restart blood pressure medications tomorrow or later on today if blood pressure starts running 140/90  Follow up with PCP this coming week for blood pressure checks and repeat kidney function tests.      Pending Results     No orders found from 5/30/2017 to 6/2/2017.            Admission Information     Date & Time Provider Department Dept. Phone    6/1/2017 Colin Krause MD [facility] [phone]      Your Vitals Were     Blood Pressure Pulse Temperature Respirations Height Weight    103/86 (BP Location: Right arm) 74 98.9  F (37.2  C) (Oral) 18 1.543 m (5' 0.75\") 96.5 kg (212 lb 11.9 oz)    Pulse Oximetry BMI (Body Mass Index)                91% 40.53 kg/m2          MyChart Information     [facility] gives you secure access to your electronic health record. If you see a primary care provider, you can also send messages to your care team and make appointments. If you have questions, please call your primary care clinic.  If you do not have a primary care provider, please call [phone] and they will assist you.        Care EveryWhere ID     This is your Care EveryWhere ID. This could be used by other organizations to access your [facility] medical records  [id number]           Review of your medicines      START taking        Dose / Directions    cyclobenzaprine 5 MG tablet   Commonly known as:  FLEXERIL        Dose:  5 mg   Take 1 tablet (5 mg) by mouth 3 times daily as needed for muscle spasms   Quantity:  21 tablet   Refills:  0       ferrous fumarate 65 mg ([place]. FE)-Vitamin C 125 mg  MG Tabs tablet   Commonly known as:  VITRON C        Dose:  1 tablet   Take 1 tablet by mouth 2 times daily   Quantity:  30 tablet   Refills:  1       hydrOXYzine 25 MG tablet   Commonly known as:  ATARAX        Dose:  25-50 mg   Take 1-2 " tablets (25-50 mg) by mouth every 4 hours as needed for itching   Quantity:  30 tablet   Refills:  1       oxyCODONE 5 MG IR tablet   Commonly known as:  ROXICODONE        Dose:  5-10 mg   Take 1-2 tablets (5-10 mg) by mouth every 3 hours as needed for moderate to severe pain   Quantity:  60 tablet   Refills:  0       senna-docusate 8.6-50 MG per tablet   Commonly known as:  SENOKOT-S;PERICOLACE        Dose:  1-2 tablet   Take 1-2 tablets by mouth 2 times daily   Quantity:  100 tablet   Refills:  1         CONTINUE these medicines which may have CHANGED, or have new prescriptions. If we are uncertain of the size of tablets/capsules you have at home, strength may be listed as something that might have changed.        Dose / Directions    aspirin 325 MG EC tablet   This may have changed:    - medication strength  - how much to take        Dose:  325 mg   Take 1 tablet (325 mg) by mouth daily   Quantity:  40 tablet   Refills:  0       * order for DME   This may have changed:  Another medication with the same name was added. Make sure you understand how and when to take each.   Used for:  Tibial plateau fracture, right, closed, initial encounter        Equipment being ordered: immobilizer   Quantity:  1 Device   Refills:  0       * order for DME   This may have changed:  You were already taking a medication with the same name, and this prescription was added. Make sure you understand how and when to take each.        Equipment being ordered: Walker Wheels () Treatment Diagnosis: s/p TKA   Quantity:  1 Units   Refills:  0       * Notice:  This list has 2 medication(s) that are the same as other medications prescribed for you. Read the directions carefully, and ask your doctor or other care provider to review them with you.      CONTINUE these medicines which have NOT CHANGED        Dose / Directions    acetaminophen 325 MG tablet   Commonly known as:  TYLENOL        Dose:  650 mg   Take 2 tablets by mouth every 4  hours as needed.   Quantity:  250 tablet   Refills:  0       albuterol 108 (90 BASE) MCG/ACT Inhaler   Commonly known as:  PROAIR HFA/PROVENTIL HFA/VENTOLIN HFA   Used for:  Mild intermittent asthma with exacerbation        Dose:  2 puff   Inhale 2 puffs into the lungs every 4 hours as needed for shortness of breath / dyspnea   Quantity:  1 Inhaler   Refills:  2       alendronate 70 MG tablet   Commonly known as:  FOSAMAX   Used for:  Osteoporosis   Notes to Patient:  Resume          Dose:  70 mg   Take 1 tablet (70 mg) by mouth every 7 days Take with over 8 ounces water and stay upright for at least 30 minutes after dose.  Take at least 60 minutes before breakfast.   Quantity:  13 tablet   Refills:  3       atorvastatin 40 MG tablet   Commonly known as:  LIPITOR   Used for:  Coronary artery disease involving native coronary artery of native heart without angina pectoris        Dose:  40 mg   Take 1 tablet (40 mg) by mouth daily   Quantity:  90 tablet   Refills:  3       fluticasone 110 MCG/ACT Inhaler   Commonly known as:  FLOVENT HFA   Used for:  Mild intermittent asthma with exacerbation        Dose:  2 puff   Inhale 2 puffs into the lungs 2 times daily   Quantity:  3 Inhaler   Refills:  3       fluticasone 50 MCG/ACT spray   Commonly known as:  FLONASE   Used for:  Chronic sinusitis, unspecified location        Dose:  2 spray   Spray 2 sprays into both nostrils daily   Quantity:  16 g   Refills:  prn       lisinopril 5 MG tablet   Commonly known as:  PRINIVIL/ZESTRIL   Used for:  Coronary artery disease involving native coronary artery of native heart without angina pectoris   Notes to Patient:  Resume          Dose:  5 mg   Take 1 tablet (5 mg) by mouth daily   Quantity:  90 tablet   Refills:  3       LORazepam 1 MG tablet   Commonly known as:  ATIVAN   Used for:  Anxiety        Dose:  0.5-1 mg   Take 0.5-1 tablets (0.5-1 mg) by mouth every 8 hours as needed for anxiety Take the night before a big event; may  repeat in the AM if needed.   Quantity:  10 tablet   Refills:  1       metoprolol 25 MG 24 hr tablet   Commonly known as:  TOPROL XL   Used for:  Coronary artery disease involving native coronary artery of native heart without angina pectoris   Notes to Patient:  Resume          Dose:  25 mg   Take 1 tablet (25 mg) by mouth daily   Quantity:  90 tablet   Refills:  3       montelukast 10 MG tablet   Commonly known as:  SINGULAIR   Used for:  Chronic rhinitis        Dose:  10 mg   Take 1 tablet (10 mg) by mouth At Bedtime   Quantity:  90 tablet   Refills:  3       nitroglycerin 0.4 MG sublingual tablet   Commonly known as:  NITROSTAT   Used for:  CAD (coronary artery disease)        Dose:  0.4 mg   Place 1 tablet (0.4 mg) under the tongue every 5 minutes as needed for chest pain   Quantity:  25 tablet   Refills:  3       omeprazole 40 MG capsule   Commonly known as:  priLOSEC   Used for:  Gastroesophageal reflux disease without esophagitis        Dose:  40 mg   Take 1 capsule (40 mg) by mouth every 12 hours   Quantity:  180 capsule   Refills:  3       sertraline 25 MG tablet   Commonly known as:  ZOLOFT   Used for:  Anxiety        Dose:  25 mg   Take 1 tablet (25 mg) by mouth daily   Quantity:  90 tablet   Refills:  3       sucralfate 1 GM tablet   Commonly known as:  CARAFATE   Used for:  Epigastric pain, Hiatal hernia        Dose:  1 g   Take 1 tablet (1 g) by mouth 4 times daily May dissolve in 2 tsp water.   Quantity:  360 tablet   Refills:  3         STOP taking     HYDROcodone-acetaminophen 5-325 MG per tablet   Commonly known as:  NORCO                Where to get your medicines      These medications were sent to [facility] [place], [place] - [address]  [address] [address]     Phone:  [phone]     aspirin 325 MG EC tablet    cyclobenzaprine 5 MG tablet    ferrous fumarate 65 mg ([place]. FE)-Vitamin C 125 mg  MG Tabs tablet    hydrOXYzine 25 MG tablet     senna-docusate 8.6-50 MG per tablet         Some of these will need a paper prescription and others can be bought over the counter. Ask your nurse if you have questions.     Bring a paper prescription for each of these medications     order for DME    oxyCODONE 5 MG IR tablet                Protect others around you: Learn how to safely use, store and throw away your medicines at www.disposemymeds.org.             Medication List: This is a list of all your medications and when to take them. Check marks below indicate your daily home schedule. Keep this list as a reference.      Medications           Morning Afternoon Evening Bedtime As Needed    acetaminophen 325 MG tablet   Commonly known as:  TYLENOL   Take 2 tablets by mouth every 4 hours as needed.   Last time this was given:  975 mg on 6/4/2017 12:37 PM                                   albuterol 108 (90 BASE) MCG/ACT Inhaler   Commonly known as:  PROAIR HFA/PROVENTIL HFA/VENTOLIN HFA   Inhale 2 puffs into the lungs every 4 hours as needed for shortness of breath / dyspnea                                   alendronate 70 MG tablet   Commonly known as:  FOSAMAX   Take 1 tablet (70 mg) by mouth every 7 days Take with over 8 ounces water and stay upright for at least 30 minutes after dose.  Take at least 60 minutes before breakfast.   Next Dose Due:  6/5/17   Notes to Patient:  Resume                                     aspirin 325 MG EC tablet   Take 1 tablet (325 mg) by mouth daily   Last time this was given:  325 mg on 6/4/2017  9:08 AM   Next Dose Due:  6/5/17                                   atorvastatin 40 MG tablet   Commonly known as:  LIPITOR   Take 1 tablet (40 mg) by mouth daily   Last time this was given:  40 mg on 6/4/2017  9:07 AM   Next Dose Due:  6/5/17                                   cyclobenzaprine 5 MG tablet   Commonly known as:  FLEXERIL   Take 1 tablet (5 mg) by mouth 3 times daily as needed for muscle spasms   Last time this was given:   5 mg on 6/4/2017  9:08 AM                                   ferrous fumarate 65 mg ([place]. FE)-Vitamin C 125 mg  MG Tabs tablet   Commonly known as:  VITRON C   Take 1 tablet by mouth 2 times daily   Next Dose Due:  6/4/17                                   fluticasone 110 MCG/ACT Inhaler   Commonly known as:  FLOVENT HFA   Inhale 2 puffs into the lungs 2 times daily   Next Dose Due:  6/5/17                                      fluticasone 50 MCG/ACT spray   Commonly known as:  FLONASE   Spray 2 sprays into both nostrils daily   Last time this was given:  2 sprays on 6/4/2017 10:03 AM   Next Dose Due:  6/5/17                                   hydrOXYzine 25 MG tablet   Commonly known as:  ATARAX   Take 1-2 tablets (25-50 mg) by mouth every 4 hours as needed for itching   Last time this was given:  50 mg on 6/3/2017 12:08 PM                                   lisinopril 5 MG tablet   Commonly known as:  PRINIVIL/ZESTRIL   Take 1 tablet (5 mg) by mouth daily   Last time this was given:  5 mg on 6/3/2017  9:59 AM   Notes to Patient:  Resume                                  LORazepam 1 MG tablet   Commonly known as:  ATIVAN   Take 0.5-1 tablets (0.5-1 mg) by mouth every 8 hours as needed for anxiety Take the night before a big event; may repeat in the AM if needed.   Last time this was given:  0.5 mg on 6/1/2017 10:21 PM                                   metoprolol 25 MG 24 hr tablet   Commonly known as:  TOPROL XL   Take 1 tablet (25 mg) by mouth daily   Last time this was given:  25 mg on 6/3/2017  9:59 AM   Notes to Patient:  Resume                                  montelukast 10 MG tablet   Commonly known as:  SINGULAIR   Take 1 tablet (10 mg) by mouth At Bedtime   Last time this was given:  10 mg on 6/3/2017  8:57 PM   Next Dose Due:  6/4/17                                   nitroglycerin 0.4 MG sublingual tablet   Commonly known as:  NITROSTAT   Place 1 tablet (0.4 mg) under the tongue every 5 minutes as needed  for chest pain                                   omeprazole 40 MG capsule   Commonly known as:  priLOSEC   Take 1 capsule (40 mg) by mouth every 12 hours   Last time this was given:  40 mg on 6/4/2017  9:07 AM   Next Dose Due:  6/4/17                                   * order for DME   Equipment being ordered: immobilizer                                * order for DME   Equipment being ordered: Walker Wheels () Treatment Diagnosis: s/p TKA                                oxyCODONE 5 MG IR tablet   Commonly known as:  ROXICODONE   Take 1-2 tablets (5-10 mg) by mouth every 3 hours as needed for moderate to severe pain   Last time this was given:  10 mg on 6/4/2017 12:38 PM                                   senna-docusate 8.6-50 MG per tablet   Commonly known as:  SENOKOT-S;PERICOLACE   Take 1-2 tablets by mouth 2 times daily   Last time this was given:  1 tablet on 6/4/2017  9:08 AM   Next Dose Due:  6/4/17                                   sertraline 25 MG tablet   Commonly known as:  ZOLOFT   Take 1 tablet (25 mg) by mouth daily   Last time this was given:  25 mg on 6/4/2017  9:19 AM   Next Dose Due:  6/5/17                                   sucralfate 1 GM tablet   Commonly known as:  CARAFATE   Take 1 tablet (1 g) by mouth 4 times daily May dissolve in 2 tsp water.   Last time this was given:  1 g on 6/4/2017 12:38 PM   Next Dose Due:  6/4/17                                      * Notice:  This list has 2 medication(s) that are the same as other medications prescribed for you. Read the directions carefully, and ask your doctor or other care provider to review them with you.              More Information        Patient Education    Oxycodone Hydrochloride Oral capsule    Oxycodone Hydrochloride Oral solution    Oxycodone Hydrochloride Oral tablet    Oxycodone Hydrochloride Oral tablet [Abuse Deterrent]    Oxycodone Hydrochloride Oral tablet, extended-release  Oxycodone Hydrochloride Oral tablet  What is this  medicine?  OXYCODONE (ox i KOSELVIN done) is a pain reliever. It is used to treat moderate to severe pain.  This medicine may be used for other purposes; ask your health care provider or pharmacist if you have questions.  What should I tell my health care provider before I take this medicine?  They need to know if you have any of these conditions:    [place]'s disease    brain tumor    drug abuse or addiction    head injury    heart disease    if you frequently drink alcohol containing drinks    kidney disease or problems going to the bathroom    liver disease    lung disease, asthma, or breathing problems    mental problems    an unusual or allergic reaction to oxycodone, codeine, hydrocodone, morphine, other medicines, foods, dyes, or preservatives    pregnant or trying to get pregnant    breast-feeding  How should I use this medicine?  Take this medicine by mouth with a glass of water. Follow the directions on the prescription label. You can take it with or without food. If it upsets your stomach, take it with food. Take your medicine at regular intervals. Do not take it more often than directed. Do not stop taking except on your doctor's advice.  Some brands of this medicine, like Oxecta, have special instructions. Ask your doctor or pharmacist if these directions are for you: Do not cut, crush or chew this medicine. Swallow only one tablet at a time. Do not wet, soak, or lick the tablet before you take it.  Talk to your pediatrician regarding the use of this medicine in children. Special care may be needed.  Overdosage: If you think you have taken too much of this medicine contact a poison control center or emergency room at once.  NOTE: This medicine is only for you. Do not share this medicine with others.  What if I miss a dose?  If you miss a dose, take it as soon as you can. If it is almost time for your next dose, take only that dose. Do not take double or extra doses.  What may interact with this  medicine?    alcohol    antihistamines    certain medicines used for nausea like chlorpromazine, droperidol    erythromycin    ketoconazole    medicines for depression, anxiety, or psychotic disturbances    medicines for sleep    muscle relaxants    naloxone    naltrexone    narcotic medicines (opiates) for pain    nilotinib    phenobarbital    phenytoin    rifampin    ritonavir    voriconazole  This list may not describe all possible interactions. Give your health care provider a list of all the medicines, herbs, non-prescription drugs, or dietary supplements you use. Also tell them if you smoke, drink alcohol, or use illegal drugs. Some items may interact with your medicine.  What should I watch for while using this medicine?  Tell your doctor or health care professional if your pain does not go away, if it gets worse, or if you have new or a different type of pain. You may develop tolerance to the medicine. Tolerance means that you will need a higher dose of the medicine for pain relief. Tolerance is normal and is expected if you take this medicine for a long time.  Do not suddenly stop taking your medicine because you may develop a severe reaction. Your body becomes used to the medicine. This does NOT mean you are addicted. Addiction is a behavior related to getting and using a drug for a non-medical reason. If you have pain, you have a medical reason to take pain medicine. Your doctor will tell you how much medicine to take. If your doctor wants you to stop the medicine, the dose will be slowly lowered over time to avoid any side effects.  You may get drowsy or dizzy when you first start taking this medicine or change doses. Do not drive, use machinery, or do anything that may be dangerous until you know how the medicine affects you. Stand or sit up slowly.  There are different types of narcotic medicines (opiates) for pain. If you take more than one type at the same time, you may have more side effects. Give  your health care provider a list of all medicines you use. Your doctor will tell you how much medicine to take. Do not take more medicine than directed. Call emergency for help if you have problems breathing.  This medicine will cause constipation. Try to have a bowel movement at least every 2 to 3 days. If you do not have a bowel movement for 3 days, call your doctor or health care professional.  Your mouth may get dry. Drinking water, chewing sugarless gum, or sucking on hard candy may help. See your dentist every 6 months.  What side effects may I notice from receiving this medicine?  Side effects that you should report to your doctor or health care professional as soon as possible:    allergic reactions like skin rash, itching or hives, swelling of the face, lips, or tongue    breathing problems    confusion    feeling faint or lightheaded, falls    trouble passing urine or change in the amount of urine    unusually weak or tired  Side effects that usually do not require medical attention (report to your doctor or health care professional if they continue or are bothersome):    constipation    dry mouth    itching    nausea, vomiting    upset stomach  This list may not describe all possible side effects. Call your doctor for medical advice about side effects. You may report side effects to FDA at 5-699-FDA-7267.  Where should I keep my medicine?  Keep out of the reach of children. This medicine can be abused. Keep your medicine in a safe place to protect it from theft. Do not share this medicine with anyone. Selling or giving away this medicine is dangerous and against the law.  Store at room temperature between 15 and 30 degrees C (59 and 86 degrees F). Protect from light. Keep container tightly closed.  This medicine may cause accidental overdose and death if it is taken by other adults, children, or pets. Flush any unused medicine down the toilet to reduce the chance of harm. Do not use the medicine after the  expiration date.  NOTE: This sheet is a summary. It may not cover all possible information. If you have questions about this medicine, talk to your doctor, pharmacist, or health care provider.  NOTE:This sheet is a summary. It may not cover all possible information. If you have questions about this medicine, talk to your doctor, pharmacist, or health care provider. Copyright  2016 [employer]                Patient Education    Cyclobenzaprine Hydrochloride Oral capsule, extended-release    Cyclobenzaprine Hydrochloride Oral tablet  Cyclobenzaprine Hydrochloride Oral tablet  What is this medicine?  CYCLOBENZAPRINE (sye kloe HARRISON za preen) is a muscle relaxer. It is used to treat muscle pain, spasms, and stiffness.  This medicine may be used for other purposes; ask your health care provider or pharmacist if you have questions.  What should I tell my health care provider before I take this medicine?  They need to know if you have any of these conditions:    heart disease, irregular heartbeat, or previous heart attack    liver disease    thyroid problem    an unusual or allergic reaction to cyclobenzaprine, tricyclic antidepressants, lactose, other medicines, foods, dyes, or preservatives    pregnant or trying to get pregnant    breast-feeding  How should I use this medicine?  Take this medicine by mouth with a glass of water. Follow the directions on the prescription label. If this medicine upsets your stomach, take it with food or milk. Take your medicine at regular intervals. Do not take it more often than directed.  Talk to your pediatrician regarding the use of this medicine in children. Special care may be needed.  Overdosage: If you think you have taken too much of this medicine contact a poison control center or emergency room at once.  NOTE: This medicine is only for you. Do not share this medicine with others.  What if I miss a dose?  If you miss a dose, take it as soon as you can. If it is almost time for  your next dose, take only that dose. Do not take double or extra doses.  What may interact with this medicine?  Do not take this medicine with any of the following medications:    certain medicines for fungal infections like fluconazole, itraconazole, ketoconazole, posaconazole, voriconazole    cisapride    dofetilide    dronedarone    droperidol    flecainide    grepafloxacin    halofantrine    levomethadyl    MAOIs like Carbex, Eldepryl, Marplan, Nardil, and Parnate    nilotinib    pimozide    probucol    sertindole    thioridazine    ziprasidone  This medicine may also interact with the following medications:    abarelix    alcohol    certain medicines for cancer    certain medicines for depression, anxiety, or psychotic disturbances    certain medicines for infection like alfuzosin, chloroquine, clarithromycin, levofloxacin, mefloquine, pentamidine, troleandomycin    certain medicines for an irregular heart beat    certain medicines used for sleep or numbness during surgery or procedure    contrast dyes    dolasetron    guanethidine    methadone    octreotide    ondansetron    other medicines that prolong the QT interval (cause an abnormal heart rhythm)    palonosetron    phenothiazines like chlorpromazine, mesoridazine, prochlorperazine, thioridazine    tramadol    vardenafil  This list may not describe all possible interactions. Give your health care provider a list of all the medicines, herbs, non-prescription drugs, or dietary supplements you use. Also tell them if you smoke, drink alcohol, or use illegal drugs. Some items may interact with your medicine.  What should I watch for while using this medicine?  Check with your doctor or health care professional if your condition does not improve within 1 to 3 weeks.  You may get drowsy or dizzy when you first start taking the medicine or change doses. Do not drive, use machinery, or do anything that may be dangerous until you know how the medicine affects you.  Stand or sit up slowly.  Your mouth may get dry. Drinking water, chewing sugarless gum, or sucking on hard candy may help.  What side effects may I notice from receiving this medicine?  Side effects that you should report to your doctor or health care professional as soon as possible:    allergic reactions like skin rash, itching or hives, swelling of the face, lips, or tongue    chest pain    fast heartbeat    hallucinations    seizures    vomiting  Side effects that usually do not require medical attention (report to your doctor or health care professional if they continue or are bothersome):    headache  This list may not describe all possible side effects. Call your doctor for medical advice about side effects. You may report side effects to FDA at 7-289-FDA-2261.  Where should I keep my medicine?  Keep out of the reach of children.  Store at room temperature between 15 and 30 degrees C (59 and 86 degrees F). Keep container tightly closed. Throw away any unused medicine after the expiration date.  NOTE:This sheet is a summary. It may not cover all possible information. If you have questions about this medicine, talk to your doctor, pharmacist, or health care provider. Copyright  2016 [employer]                Patient Education    Hydroxyzine Hydrochloride Oral solution    Hydroxyzine Hydrochloride Oral tablet    Hydroxyzine Hydrochloride Solution for injection    Hydroxyzine Pamoate Oral capsule    Hydroxyzine Pamoate Oral suspension  Hydroxyzine Hydrochloride Oral tablet  What is this medicine?  HYDROXYZINE (trevon DROX i zeen) is an antihistamine. This medicine is used to treat allergy symptoms. It is also used to treat anxiety and tension. This medicine can be used with other medicines to induce sleep before surgery.  This medicine may be used for other purposes; ask your health care provider or pharmacist if you have questions.  What should I tell my health care provider before I take this medicine?  They  need to know if you have any of these conditions:    any chronic illness    difficulty passing urine    glaucoma    heart disease    kidney disease    liver disease    lung disease    an unusual or allergic reaction to hydroxyzine, cetirizine, other medicines, foods, dyes, or preservatives    pregnant or trying to get pregnant    breast-feeding  How should I use this medicine?  Take this medicine by mouth with a full glass of water. Follow the directions on the prescription label. You may take this medicine with food or on an empty stomach. Take your medicine at regular intervals. Do not take your medicine more often than directed.  Talk to your pediatrician regarding the use of this medicine in children. Special care may be needed. While this drug may be prescribed for children as young as 6 years of age for selected conditions, precautions do apply.  Patients over 65 years old may have a stronger reaction and need a smaller dose.  Overdosage: If you think you have taken too much of this medicine contact a poison control center or emergency room at once.  NOTE: This medicine is only for you. Do not share this medicine with others.  What if I miss a dose?  If you miss a dose, take it as soon as you can. If it is almost time for your next dose, take only that dose. Do not take double or extra doses.  What may interact with this medicine?    alcohol    barbiturate medicines for sleep or seizures    medicines for colds, allergies    medicines for depression, anxiety, or emotional disturbances    medicines for pain    medicines for sleep    muscle relaxants  This list may not describe all possible interactions. Give your health care provider a list of all the medicines, herbs, non-prescription drugs, or dietary supplements you use. Also tell them if you smoke, drink alcohol, or use illegal drugs. Some items may interact with your medicine.  What should I watch for while using this medicine?  Tell your doctor or health  care professional if your symptoms do not improve.  You may get drowsy or dizzy. Do not drive, use machinery, or do anything that needs mental alertness until you know how this medicine affects you. Do not stand or sit up quickly, especially if you are an older patient. This reduces the risk of dizzy or fainting spells. Alcohol may interfere with the effect of this medicine. Avoid alcoholic drinks.  Your mouth may get dry. Chewing sugarless gum or sucking hard candy, and drinking plenty of water may help. Contact your doctor if the problem does not go away or is severe.  This medicine may cause dry eyes and blurred vision. If you wear contact lenses you may feel some discomfort. Lubricating drops may help. See your eye doctor if the problem does not go away or is severe.  If you are receiving skin tests for allergies, tell your doctor you are using this medicine.  What side effects may I notice from receiving this medicine?  Side effects that you should report to your doctor or health care professional as soon as possible:    fast or irregular heartbeat    difficulty passing urine    seizures    slurred speech or confusion    tremor  Side effects that usually do not require medical attention (report to your doctor or health care professional if they continue or are bothersome):    constipation    drowsiness    fatigue    headache    stomach upset  This list may not describe all possible side effects. Call your doctor for medical advice about side effects. You may report side effects to FDA at 8-243-FDA-8176.  Where should I keep my medicine?  Keep out of the reach of children.  Store at room temperature between 15 and 30 degrees C (59 and 86 degrees F). Keep container tightly closed. Throw away any unused medicine after the expiration date.  NOTE:This sheet is a summary. It may not cover all possible information. If you have questions about this medicine, talk to your doctor, pharmacist, or health care provider.  Copyright  2016 [employer]                Home Safety After Joint Surgery  If your movement is limited during recovery, ask a family member or friend to help prepare your living space. This helps make it safer and more comfortable while you heal. Use the tips below as a guide.    Home safety tips    Stock up on toiletries, foods that are easy to prepare, and other items you ll need during recovery.    Store foods and other supplies between waist and shoulder level. This makes it easier to reach things without straining.    Buy or borrow a portable telephone so you can keep it within easy reach.    Ask your doctor whether you need to limit using stairs. If you do, and you normally sleep upstairs, prepare a bedroom on the main living level.    Make sure rooms are well-lit.    Keep items you use often in easy reach.    Move electrical cords out of the way so they don t trip you.    Remove throw rugs to prevent slipping or tripping.    Watch for pets or small objects on the floor.    Add firm pillows to a low chair to help make getting up easier.  Ask friends and family to help out by checking in with you regularly. They may also help by running simple errands or doing small jobs around the house as you recover.     2870-3874 [employer]. [address] [place] [address], [place] [address]. All rights reserved. This information is not intended as a substitute for professional medical care. Always follow your healthcare professional's instructions.                After Knee Replacement: The First Month  You ll apply the same movement skills you learned in the hospital or rehab center to your exercise program at home. You may also continue meeting with your physical therapist. Following your exercise program brings big rewards. With your knee in shape, you ll walk more easily and return to an active life sooner.    Maintaining your exercise program  Exercising is the only way to regain your strength and range of  motion. With continued exercise, you may gain even more strength and range of motion than you had before surgery. That s because before surgery, pain and stiffness may have limited your movement. So make exercise part of your daily routine. Continue meeting with your physical therapist as directed. He or she may add riding a stationary bike, swimming, or other new exercises to your program.  Walking in stride  Walking helps build a more normal, comfortable stride. It also keeps you in shape and helps prevent blood clots. Begin by taking three or four short walks every day. Gradually increase how far, how long, and how many times a day you walk. After your walk, lie down, elevate your knee, and ice it to reduce swelling. Your doctor or physical therapist will instruct you when and where to use your walker, crutches, or cane. He or she will also let you know when you can stop using them.    0856-5560 [employer]. [address] [place] [address]. All rights reserved. This information is not intended as a substitute for professional medical care. Always follow your healthcare professional's instructions.                Discharge Instructions for Total Knee Replacement  You have undergone knee replacement surgery. The knee joint forms where the thighbone, shinbone, and kneecap meet. The knee joint is supported by muscles and ligaments, and is lined with a cushioning called cartilage. Over time, cartilage wears away. This can make the knee feel stiff and painful. Your doctor replaced your painful joint with a knee prosthesis (artificial joint) to relieve pain and restore movement. Here are some instructions to follow once at home.  Home care    When you are allowed to shower, carefully wash your incision with soap and water. Rinse the incision well. Then gently pat it dry. Don t rub the incision, or apply creams or lotions. Sit on a shower stool or chair when you shower to keep from  falling.    Take pain medication as directed by your doctor.  Sitting and sleeping    Sit in chairs with arms. The arms make it easier for you to stand up or sit down.    Don t sit for more than 30 to 45 minutes at one time.    Nap if you are tired, but don t stay in bed all day.    Sleep with a pillow under your ankle, not your knee. Be sure to change the position of your leg during the night.  Moving safely    The key to successful recovery is movement with walking and exercising your knee as directed by your doctor. You should be able to put full weight on your leg unless your doctor tells you otherwise.     Walk up and down stairs with support. Try one step at a time--good knee up, bad knee down. Use the railing if possible.    Don t drive until your doctor says it s OK. Most people can start driving about 6 weeks after surgery. Don t drive while you are taking opioid pain medication.  Other precautions    Avoid soaking your knee in water (no hot tubs, bathtubs, swimming pools) until your doctor says it s OK.    Wear the support stockings you were given in the hospital, as instructed by your doctor. You may wear these stockings for four to six weeks after surgery. If needed, you can place a bandage over the incision to prevent irritation from clothing or support stockings.    Arrange your household to keep the items you need handy. Keep everything else out of the way. Remove items that may cause you to fall, such as throw rugs and electrical cords.    Use nonslip bath mats, grab bars, an elevated toilet seat, and a shower chair in your bathroom.    Until your balance, flexibility, and strength improve, use a cane, crutches, a walker, handrails, or someone to help you.    Keep your hands free by using a backpack, marilin pack, apron, or pockets to carry things.    Prevent infection. Ask your doctor for instructions if you haven t already received them. Any infection will need to be treated immediately with  antibiotics. Call your doctor right away if you think you might have an infection.    Tell your dentist that you have an artificial joint and take antibiotics as prescribed before any dental work.    Tell all your health care providers about your artificial joint before any medical procedure.    Maintain a healthy weight. Get help to lose any extra pounds. Added body weight puts stress on the knee.    Take any medication you may have been given after surgery. This may include blood-thinning medications to prevent blood clots or antibiotics to prevent infection.  Follow-up  You will need to have your staples removed two to three weeks following surgery.     When to seek medical attention  Call 911 right away if you have:    Chest pain.    Shortness of breath.    Any pain or tenderness in your calf.  Otherwise, call your doctor immediately if you have:    Fever of 100.4 degrees Fahrenheit (38 degrees Celsius) or higher, or shaking chills.    Stiffness, or inability to move the knee.    Increased swelling in your leg.    Increased redness, tenderness, or swelling in or around the knee incision.    Drainage from the knee incision.    Increased knee pain.     [id number] [employer]. [address] [place] [id number]. All rights reserved. This information is not intended as a substitute for professional medical care. Always follow your healthcare professional's instructions.

## 2017-06-01 NOTE — IP AVS SNAPSHOT
"    [facility] SURGICAL: [phone]                                              INTERAGENCY TRANSFER FORM - PHYSICIAN ORDERS   2017                    Hospital Admission Date: 2017  DENISE GONZALEZ   : 1950  Sex: Female        Attending Provider: (none)    Allergies:  Amoxicillin, Rosuvastatin, Reglan [Metoclopramide Hcl]    Infection:  None   Service:  SURGERY    Ht:  1.543 m (5' 0.75\")   Wt:  96.5 kg (212 lb 11.9 oz)   Admission Wt:  88.5 kg (195 lb)    BMI:  40.53 kg/m 2   BSA:  2.03 m 2            Patient PCP Information     Provider PCP Type    Nadia Ramirez MD General      ED Clinical Impression     Diagnosis Description Comment Added By Time Added    Status post total right knee replacement [Z96.651] Status post total right knee replacement [Z96.651]  Bree Acosta PA-C 2017  9:08 AM    Postoperative anemia [D64.9] Postoperative anemia [D64.9]  Anjelica Chowdhury PA-C 2017 10:38 AM    Coronary artery disease involving native coronary artery of native heart without angina pectoris [I25.10] Coronary artery disease involving native coronary artery of native heart without angina pectoris [I25.10]  Anjelica Chowdhury PA-C 2017 10:57 AM      Hospital Problems as of 2017              Priority Class Noted POA    GERD (gastroesophageal reflux disease)   2011 Yes    Hyperlipidemia LDL goal <100   2011 Yes    Mild intermittent asthma with exacerbation   2011 Yes    CAD (coronary artery disease)   2012 Yes    Anxiety   10/3/2013 Yes    * (Principal)S/P total knee arthroplasty   2017 Yes    Elevated glucose   2017 Yes    Acute kidney failure (H)   6/3/2017 Unknown    Postoperative anemia   6/3/2017 Unknown      Non-Hospital Problems as of 2017              Priority Class Noted    Nasal polyp   Unknown    Hiatal hernia   2010    Colon polyps   10/19/2010    Advanced directives, counseling/discussion   2011    " Chronic sinusitis   8/23/2011    Non-ST elevation myocardial infarction (NSTEMI), initial care episode (H)   5/13/2012    BCC (basal cell carcinoma), face   3/28/2013    Osteoporosis   10/13/2015      Code Status History     Date Active Date Inactive Code Status Order ID Comments User Context    6/1/2017  9:23 PM 6/4/2017  5:05 PM Full Code [id number]  Colin Krause MD Inpatient    2/1/2013  5:56 PM 2/2/2013  7:50 PM Full Code [id number]  Tere Fajardo MD Inpatient    5/14/2012 11:17 AM 2/1/2013  5:56 PM Full Code [id number]  Betty Goldman MD Outpatient    5/12/2012 11:12 PM 5/14/2012 11:17 AM Full Code [id number]  Trevor Reinoso MD Inpatient    5/12/2012  4:49 PM 5/12/2012 11:12 PM Full Code [id number]  Clarisa Valencia RN Inpatient         Medication Review      START taking        Dose / Directions Comments    cyclobenzaprine 5 MG tablet   Commonly known as:  FLEXERIL   Used for:  Status post total right knee replacement        Dose:  5 mg   Take 1 tablet (5 mg) by mouth 3 times daily as needed for muscle spasms   Quantity:  21 tablet   Refills:  0        ferrous fumarate 65 mg ([employer]. FE)-Vitamin C 125 mg  MG Tabs tablet   Commonly known as:  VITRON C   Used for:  Status post total right knee replacement        Dose:  1 tablet   Take 1 tablet by mouth 2 times daily   Quantity:  30 tablet   Refills:  1        hydrOXYzine 25 MG tablet   Commonly known as:  ATARAX   Used for:  Status post total right knee replacement        Dose:  25-50 mg   Take 1-2 tablets (25-50 mg) by mouth every 4 hours as needed for itching   Quantity:  30 tablet   Refills:  1        oxyCODONE 5 MG IR tablet   Commonly known as:  ROXICODONE   Used for:  Status post total right knee replacement        Dose:  5-10 mg   Take 1-2 tablets (5-10 mg) by mouth every 3 hours as needed for moderate to severe pain   Quantity:  60 tablet   Refills:  0        senna-docusate 8.6-50 MG per tablet   Commonly known as:  SENOKOT-S;PERICOLACE    Used for:  Status post total right knee replacement        Dose:  1-2 tablet   Take 1-2 tablets by mouth 2 times daily   Quantity:  100 tablet   Refills:  1          CONTINUE these medications which may have CHANGED, or have new prescriptions. If we are uncertain of the size of tablets/capsules you have at home, strength may be listed as something that might have changed.        Dose / Directions Comments    aspirin 325 MG EC tablet   This may have changed:    - medication strength  - how much to take   Used for:  Status post total right knee replacement        Dose:  325 mg   Take 1 tablet (325 mg) by mouth daily   Quantity:  40 tablet   Refills:  0    May resume home dose of 81 mg upon completion of 325 mg Aspirin.       * order for DME   This may have changed:  Another medication with the same name was added. Make sure you understand how and when to take each.   Used for:  Tibial plateau fracture, right, closed, initial encounter        Equipment being ordered: immobilizer   Quantity:  1 Device   Refills:  0        * order for DME   This may have changed:  You were already taking a medication with the same name, and this prescription was added. Make sure you understand how and when to take each.   Used for:  Status post total right knee replacement        Equipment being ordered: Walker Wheels () Treatment Diagnosis: s/p TKA   Quantity:  1 Units   Refills:  0        * Notice:  This list has 2 medication(s) that are the same as other medications prescribed for you. Read the directions carefully, and ask your doctor or other care provider to review them with you.      CONTINUE these medications which have NOT CHANGED        Dose / Directions Comments    acetaminophen 325 MG tablet   Commonly known as:  TYLENOL        Dose:  650 mg   Take 2 tablets by mouth every 4 hours as needed.   Quantity:  250 tablet   Refills:  0        albuterol 108 (90 BASE) MCG/ACT Inhaler   Commonly known as:  PROAIR HFA/PROVENTIL  HFA/VENTOLIN HFA   Used for:  Mild intermittent asthma with exacerbation        Dose:  2 puff   Inhale 2 puffs into the lungs every 4 hours as needed for shortness of breath / dyspnea   Quantity:  1 Inhaler   Refills:  2        alendronate 70 MG tablet   Commonly known as:  FOSAMAX   Used for:  Osteoporosis   Notes to Patient:  Resume          Dose:  70 mg   Take 1 tablet (70 mg) by mouth every 7 days Take with over 8 ounces water and stay upright for at least 30 minutes after dose.  Take at least 60 minutes before breakfast.   Quantity:  13 tablet   Refills:  3        atorvastatin 40 MG tablet   Commonly known as:  LIPITOR   Used for:  Coronary artery disease involving native coronary artery of native heart without angina pectoris        Dose:  40 mg   Take 1 tablet (40 mg) by mouth daily   Quantity:  90 tablet   Refills:  3        fluticasone 110 MCG/ACT Inhaler   Commonly known as:  FLOVENT HFA   Used for:  Mild intermittent asthma with exacerbation        Dose:  2 puff   Inhale 2 puffs into the lungs 2 times daily   Quantity:  3 Inhaler   Refills:  3        fluticasone 50 MCG/ACT spray   Commonly known as:  FLONASE   Used for:  Chronic sinusitis, unspecified location        Dose:  2 spray   Spray 2 sprays into both nostrils daily   Quantity:  16 g   Refills:  prn        lisinopril 5 MG tablet   Commonly known as:  PRINIVIL/ZESTRIL   Used for:  Coronary artery disease involving native coronary artery of native heart without angina pectoris   Notes to Patient:  Resume          Dose:  5 mg   Take 1 tablet (5 mg) by mouth daily   Quantity:  90 tablet   Refills:  3        LORazepam 1 MG tablet   Commonly known as:  ATIVAN   Used for:  Anxiety        Dose:  0.5-1 mg   Take 0.5-1 tablets (0.5-1 mg) by mouth every 8 hours as needed for anxiety Take the night before a big event; may repeat in the AM if needed.   Quantity:  10 tablet   Refills:  1        metoprolol 25 MG 24 hr tablet   Commonly known as:  TOPROL XL   Used  for:  Coronary artery disease involving native coronary artery of native heart without angina pectoris   Notes to Patient:  Resume          Dose:  25 mg   Take 1 tablet (25 mg) by mouth daily   Quantity:  90 tablet   Refills:  3        montelukast 10 MG tablet   Commonly known as:  SINGULAIR   Used for:  Chronic rhinitis        Dose:  10 mg   Take 1 tablet (10 mg) by mouth At Bedtime   Quantity:  90 tablet   Refills:  3        nitroglycerin 0.4 MG sublingual tablet   Commonly known as:  NITROSTAT   Used for:  CAD (coronary artery disease)        Dose:  0.4 mg   Place 1 tablet (0.4 mg) under the tongue every 5 minutes as needed for chest pain   Quantity:  25 tablet   Refills:  3        omeprazole 40 MG capsule   Commonly known as:  priLOSEC   Used for:  Gastroesophageal reflux disease without esophagitis        Dose:  40 mg   Take 1 capsule (40 mg) by mouth every 12 hours   Quantity:  180 capsule   Refills:  3        sertraline 25 MG tablet   Commonly known as:  ZOLOFT   Used for:  Anxiety        Dose:  25 mg   Take 1 tablet (25 mg) by mouth daily   Quantity:  90 tablet   Refills:  3        sucralfate 1 GM tablet   Commonly known as:  CARAFATE   Used for:  Epigastric pain, Hiatal hernia        Dose:  1 g   Take 1 tablet (1 g) by mouth 4 times daily May dissolve in 2 tsp water.   Quantity:  360 tablet   Refills:  3          STOP taking     HYDROcodone-acetaminophen 5-325 MG per tablet   Commonly known as:  NORCO                     Further instructions from your care team       Surgical follow up   1.  Follow up with Suzette Salcido PA-C.  in 2 weeks for post op check and x rays as scheduled.  Call [phone] if appointment needed or questions  2.  Use pain medication as directed  3.  Keep incision clean, covered and dry until post op appointment.  You may shower and get incision wet if no drainage is present. You may change your dressing as needed.  No additional adhesives to be put on knee (including bandages).  Only  use dry  guaze over Prineo glue tape and then apply 4 inch ACE to hold dressings in place.   4.  Continue physical therapy as soon as possible.  You will need to call a therapy department of your choice to arrange future appointments.  Your order for physical therapy is included in your discharge paperwork.   5.  Take Aspirin 325 mg  daily  for 42 days for anticoagulation        Medical follow up:  Start taking daily iron supplements.  Restart blood pressure medications tomorrow or later on today if blood pressure starts running 140/90  Follow up with PCP this coming week for blood pressure checks and repeat kidney function tests.      Summary of Visit     Reason for your hospital stay       Right total knee arthroplasty             After Care     Activity       Your activity upon discharge: activity as tolerated and no driving until off narcotic pain medication       Diet       Follow this diet upon discharge: Orders Placed This Encounter      Advance Diet as Tolerated: Regular Diet Adult         Wound care and dressings       Instructions to care for your wound at home: as directed, daily dressing changes, ice to area for comfort, keep wound clean and dry and may get incision wet in shower but do not soak or scrub.             Referrals     Home Care PT Referral for Hospital Discharge       PT to eval and treat    Your provider has ordered home care - physical therapy. If you have not been contacted within 2 days of your discharge please call the department phone number listed on the top of this document.       Home Care Referral       Your provider has referred you to: [facility]: [facility] [phone]   [email or web address]    Extended Emergency Contact Information  Primary Emergency Contact: FAVIAN GONZALEZ  Address: [address] [place] [address] [facility]  Home Phone: [phone]  Mobile Phone:  [phone]  Relation: Spouse  Secondary Emergency Contact: [place] [address] [place]  Home Phone: [phone]  Mobile Phone: [phone]  Relation: Son    Patient Anticipated Discharge Date: 1-2 days   RN, PT, HHA to begin 24 - 48 hours after discharge.  PLEASE EVALUATE AND TREAT (Evaluation timeline is 24 - 48 hrs. Please call if there is need for a variance to this timeline).    REASON FOR REFERRAL: Assessment & Treatment: PT and RN    ADDITIONAL SERVICES NEEDED: None    OTHER PERTINENT INFORMATION: Patient was last seen by provider on 6/2/17 for TKA.    Current Outpatient Prescriptions:  oxyCODONE (ROXICODONE) 5 MG IR tablet, Take 1-2 tablets (5-10 mg) by mouth every 4 hours as needed for moderate to severe pain, Disp: 60 tablet, Rfl: 0  aspirin  MG EC tablet, Take 1 tablet (325 mg) by mouth daily, Disp: 40 tablet, Rfl: 0  hydrOXYzine (ATARAX) 25 MG tablet, Take 1 tablet (25 mg) by mouth every 6 hours as needed for itching or other (adjunct pain), Disp: 60 tablet, Rfl: 0  senna-docusate (SENOKOT-S;PERICOLACE) 8.6-50 MG per tablet, Take 1-2 tablets by mouth 2 times daily, Disp: 100 tablet, Rfl: 1  order for DME, Equipment being ordered: Walker Wheels ()  Treatment Diagnosis: s/p TKA, Disp: 1 Units, Rfl: 0      Patient Active Problem List:     Nasal polyp     Hiatal hernia     Colon polyps     GERD (gastroesophageal reflux disease)     Hyperlipidemia LDL goal <100     Advanced directives, counseling/discussion     Mild intermittent asthma with exacerbation     Chronic sinusitis     Non-ST elevation myocardial infarction (NSTEMI), initial care episode (H)     CAD (coronary artery disease)     BCC (basal cell carcinoma), face     Anxiety     Osteoporosis     S/P total knee arthroplasty     Elevated glucose      Documentation of Face to Face and Certification for Home Health Services    I certify that patientAnna is under my care and that I, or a Nurse  Practitioner or Physician's Assistant working with me, had a face-to-face encounter that meets the physician face-to-face encounter requirements with this patient on: 6/2/2017.    This encounter with the patient was in whole, or in part, for the following medical condition, which is the primary reason for Home Health Care: TKA.    I certify that, based on my findings, the following services are medically necessary Home Health Services: Nursing and Physical Therapy    My clinical findings support the need for the above services because: Physical Therapy Services are needed to assess and treat the following functional impairments: TKA.    Further, I certify that my clinical findings support that this patient is homebound (i.e. absences from home require considerable and taxing effort and are for medical reasons or Confucianist services or infrequently or of short duration when for other reasons) because: Requires assistance of another person or specialized equipment to access medical services because patient: Requires supervision of another for safe transfer..    Based on the above findings, I certify that this patient is confined to the home and needs intermittent skilled nursing care, physical therapy and/or speech therapy.  The patient is under my care, and I have initiated the establishment of the plan of care.  This patient will be followed by a physician who will periodically review the plan of care.    Physician/Provider to provide follow up care: Nadia Ramirez    [facility] Physician at time of discharge: Dr. Krause    Please be aware that coverage of these services is subject to the terms and limitations of your health insurance plan.  Call member services at your health plan with any benefit or coverage questions.       Physical Therapy Referral       *This therapy referral will be filtered to a centralized scheduling office at [facility] and the patient will  "receive a call to schedule an appointment at a [facility] location most convenient for them. *     [facility] provides Physical Therapy evaluation and treatment and many specialty services across the [facility] system.  If requesting a specialty program, please choose from the list below.    If you have not heard from the scheduling office within 2 business days, please call [phone] for all locations, with the exception of [place], please call [phone].  Treatment: Evaluation & Treatment  Special Instructions/Modalities: none  Special Programs: None    Please be aware that coverage of these services is subject to the terms and limitations of your health insurance plan.  Call member services at your health plan with any benefit or coverage questions.      **Note to Provider:  If you are referring outside of [facility] for the therapy appointment, please list the name of the location in the \"special instructions\" above, print the referral and give to the patient to schedule the appointment.              MD face to face encounter       Documentation of Face to Face and Certification for Home Health Services    I certify that patient: Anna Dahl is under my care and that I, or a nurse practitioner or physician's assistant working with me, had a face-to-face encounter that meets the physician face-to-face encounter requirements with this patient on: 6/4/2017.    This encounter with the patient was in whole, or in part, for the following medical condition, which is the primary reason for home health care: PT.    I certify that, based on my findings, the following services are medically necessary home health services: Physical Therapy.    My clinical findings support the need for the above services because: Physical Therapy Services are needed to assess and treat the following functional impairments: Right knee replacement.    Further, I certify that my clinical findings support that this " patient is homebound (i.e. absences from home require considerable and taxing effort and are for medical reasons or Holiness services or infrequently or of short duration when for other reasons) because: Leaving home is medically contraindicated for the following reason(s): Other physician ordered restriction: Pain due to recent Knee curgery.    Based on the above findings. I certify that this patient is confined to the home and needs intermittent skilled nursing care, physical therapy and/or speech therapy.  The patient is under my care, and I have initiated the establishment of the plan of care.  This patient will be followed by a physician who will periodically review the plan of care.  Physician/Provider to provide follow up care: Nadia Ramirez    Attending hospital physician (the Medicare certified [place] provider): Luz Lares  Physician Signature: See electronic signature associated with these discharge orders.  Date: 6/4/2017                  Lab Orders     Hemoglobin       Last Lab Result: Hemoglobin (g/dL)       Date                     Value                 06/04/2017               8.9 (L)          ----------   Hemoglobin in one week. Route to Dr. Ramirez             Your next 10 appointments already scheduled     Jun 08, 2017  2:40 PM CDT   Office Visit with Nadia Ramirez MD   [facility] ([facility])    [address] [facility] [address] [phone]           Bring a current list of meds and any records pertaining to this visit.  For Physicals, please bring immunization records and any forms needing to be filled out.  Please arrive 10 minutes early to complete paperwork.              Follow-Up Appointment Instructions     Future Labs/Procedures    Follow-up and recommended labs and tests     Comments:    Follow up with  Suzette Salcido PA-C , at (location with clinic name or city) [facility], within 2 weeks to evaluate  after surgery and for hospital follow- up.  [phone]     Follow up with Primary MD in one week with hemoglobin lab draw.      Follow-Up Appointment Instructions     Follow-up and recommended labs and tests       Follow up with  Suzette Salcido PA-C , at (location with clinic name or city) [facility], within 2 weeks to evaluate after surgery and for hospital follow- up.  [phone]     Follow up with Primary MD in one week with hemoglobin lab draw.

## 2017-06-01 NOTE — H&P (VIEW-ONLY)
Formerly named [facility]  [address]  [facility] [address]  [phone]  Dept: [phone]    PRE-OP EVALUATION:  Today's date: 2017    Anna Dahl (: 1950) presents for pre-operative evaluation assessment as requested by Dr. Colin Krause.  She requires evaluation and anesthesia risk assessment prior to undergoing surgery/procedure for treatment of  Right knee pain  .  Proposed procedure: Replacement Right knee     Date of Surgery/ Procedure: 17  Time of Surgery/ Procedure: 2 pm   Hospital/Surgical Facility: [facility]     Primary Physician: Nadia Ramirez  Type of Anesthesia Anticipated: Spinal    Patient has a Health Care Directive or Living Will:  NO    1. YES - Do you have a history of heart attack, stroke, stent, bypass or surgery on an artery in the head, neck, heart or legs?  2. NO - Do you ever have any pain or discomfort in your chest?  3. NO - Do you have a history of  Heart Failure?  4. NO - Are you troubled by shortness of breath when: walking on the level, up a slight hill or at night?  5. NO - Do you currently have a cold, bronchitis or other respiratory infection?  6. NO - Do you have a cough, shortness of breath or wheezing?  7. NO - Do you sometimes get pains in the calves of your legs when you walk?  8. NO - Do you or anyone in your family have previous history of blood clots?  9. NO - Do you or does anyone in your family have a serious bleeding problem such as prolonged bleeding following surgeries or cuts?  10. NO - Have you ever had problems with anemia or been told to take iron pills?  11. NO - Have you had any abnormal blood loss such as black, tarry or bloody stools, or abnormal vaginal bleeding?  12. NO - Have you ever had a blood transfusion?  13. NO - Have you or any of your relatives ever had problems with anesthesia?  14. NO - Do you have sleep apnea, excessive snoring or daytime drowsiness?  15. NO - Do you have any prosthetic heart  "valves?  16. NO - Do you have prosthetic joints?  17. NO - Is there any chance that you may be pregnant?      HPI:                                                      Brief HPI related to upcoming procedure:       She is scheduled for right TKA. A couple weeks ago she fell and sustained a tibial plateau fracture on the right. She has seen the orthopedist in because of advanced osteoarthritis in that knee. She has opted to have a total knee arthroplasty. The surgeon is requesting consultation regarding anesthesia and surgical risk for this patient with respect to current and past medical conditions.      MEDICAL HISTORY:                                                      Patient Active Problem List    Diagnosis Date Noted     CAD (coronary artery disease) 05/14/2012     Priority: High     NSTEMI May 2012      EF 35%  PCI to mid-LAD vessel and first diagonal.  Plan to return at end of May 2012 for staged stenting of RCA  Rx Effient for one year, start Crestor       Osteoporosis 10/13/2015     Priority: Medium     Anxiety 10/03/2013     Priority: Medium     BCC (basal cell carcinoma), face 03/28/2013     Priority: Medium     Non-ST elevation myocardial infarction (NSTEMI), initial care episode (H) 05/13/2012     Priority: Medium     Chronic sinusitis 08/23/2011     Priority: Medium     Advanced directives, counseling/discussion 08/22/2011     Priority: Medium     Patient does not have an Advance/Health Care Directive (HCD), given \"What is Advance Care Planning?\" flyer.    Izzy Maria  August 22, 2011         Mild intermittent asthma with exacerbation 08/22/2011     Priority: Medium     GERD (gastroesophageal reflux disease) 04/20/2011     Priority: Medium     Hyperlipidemia LDL goal <100 04/20/2011     Priority: Medium     Did not tolerate Crestor  Did not tolerate 80 mg of atorvastatin, but has been able to take 40 mg       Colon polyps 10/19/2010     Priority: Medium     20 mm polyp removed Oct 2010  Recommend " repeat colonoscopy 1 year       Hiatal hernia 09/08/2010     Priority: Medium     Nasal polyp      Priority: Medium     Nasal polyps  Problem list name updated by automated process. Provider to review        Past Medical History:   Diagnosis Date     Asthma      Basal cell carcinoma      Calculus of kidney      Gastro-oesophageal reflux disease      Renal colic      Unspecified asthma(493.90)      Unspecified nasal polyp     Nasal polyps     Unspecified otitis media      Unspecified sinusitis (chronic)     Chronic sinusitis     Past Surgical History:   Procedure Laterality Date     ENT SURGERY       ESOPHAGOSCOPY, GASTROSCOPY, DUODENOSCOPY (EGD), COMBINED  2/6/2013    Procedure: COMBINED ESOPHAGOSCOPY, GASTROSCOPY, DUODENOSCOPY (EGD), BIOPSY SINGLE OR MULTIPLE;  Gastroscopy;  Surgeon: Yves Mills MD;  Location: [facility]     ETHMOIDECTOMY  1/5/2011    ETHMOIDECTOMY performed by ADDY SERRANO at [facility]     MOHS MICROGRAPHIC PROCEDURE       MOHS MICROGRAPHIC PROCEDURE       SURGICAL HISTORY OF -   6/2003    Bilateral total ethmoidecomy with bilateral maxillary antrostomies with removal of polyps, bilateral frontal sinusotomies, and left sphenoidotomy     SURGICAL HISTORY OF -   1985    Bilateral intranasal polypectomy with bilateraly nasal antral punctures      SURGICAL HISTORY OF -   2004    Mohs micrographic surgery, fresh tissue technique with complex wound repair. below right ear at angle of jawline.     TUBAL LIGATION      tubal ligation     Current Outpatient Prescriptions   Medication Sig Dispense Refill     order for DME Equipment being ordered: immobilizer 1 Device 0     HYDROcodone-acetaminophen (NORCO) 5-325 MG per tablet Take 1-2 tablets by mouth every 6 hours as needed for moderate to severe pain 20 tablet 0     lisinopril (PRINIVIL,ZESTRIL) 5 MG tablet Take 1 tablet (5 mg) by mouth daily 90 tablet 3     fluticasone (FLONASE) 50 MCG/ACT nasal spray Spray 2 sprays into both nostrils daily 16  g prn     atorvastatin (LIPITOR) 40 MG tablet Take 1 tablet (40 mg) by mouth daily 90 tablet 3     metoprolol (TOPROL XL) 25 MG 24 hr tablet Take 1 tablet (25 mg) by mouth daily 90 tablet 3     montelukast (SINGULAIR) 10 MG tablet Take 1 tablet (10 mg) by mouth At Bedtime 90 tablet 3     fluticasone (FLOVENT HFA) 110 MCG/ACT inhaler Inhale 2 puffs into the lungs 2 times daily 3 Inhaler 3     sucralfate (CARAFATE) 1 GM tablet Take 1 tablet (1 g) by mouth 4 times daily May dissolve in 2 tsp water. 360 tablet 3     omeprazole (PRILOSEC) 40 MG capsule Take 1 capsule (40 mg) by mouth every 12 hours 180 capsule 3     alendronate (FOSAMAX) 70 MG tablet Take 1 tablet (70 mg) by mouth every 7 days Take with over 8 ounces water and stay upright for at least 30 minutes after dose.  Take at least 60 minutes before breakfast. 13 tablet 3     LORazepam (ATIVAN) 1 MG tablet Take 0.5-1 tablets (0.5-1 mg) by mouth every 8 hours as needed for anxiety Take the night before a big event; may repeat in the AM if needed. 10 tablet 1     sertraline (ZOLOFT) 25 MG tablet Take 1 tablet (25 mg) by mouth daily 90 tablet 3     aspirin 81 MG EC tablet Take 1 tablet (81 mg) by mouth daily 90 tablet 3     nitroglycerin (NITROSTAT) 0.4 MG SL tablet Place 1 tablet (0.4 mg) under the tongue every 5 minutes as needed for chest pain 25 tablet 3     albuterol (PROAIR HFA, PROVENTIL HFA, VENTOLIN HFA) 108 (90 BASE) MCG/ACT inhaler Inhale 2 puffs into the lungs every 4 hours as needed for shortness of breath / dyspnea 1 Inhaler 2     acetaminophen (TYLENOL) 325 MG tablet Take 2 tablets by mouth every 4 hours as needed. 250 tablet      OTC products: None, except as noted above    Allergies   Allergen Reactions     Amoxicillin Anaphylaxis     Rosuvastatin Other (See Comments)     Severe headache, backache     Reglan [Metoclopramide Hcl] Visual Disturbance     Disoriented, hallucinations      Latex Allergy: NO    Social History   Substance Use Topics      "Smoking status: Never Smoker     Smokeless tobacco: Never Used     Alcohol use Yes      Comment: moderate couple drinks on weekends     History   Drug Use No       REVIEW OF SYSTEMS:                                                    Constitutional, neuro, ENT, endocrine, pulmonary, cardiac, gastrointestinal, genitourinary, musculoskeletal, integument and psychiatric systems are negative, except as otherwise noted.    EXAM:                                                    /76  Pulse 68  Temp 99  F (37.2  C)  Resp 18  Ht 5' 4.75\" (1.645 m)  Wt 195 lb (88.5 kg)  BMI 32.7 kg/m2    GENERAL APPEARANCE: healthy, alert and no distress     EYES: EOMI, PERRL     HENT: ear canals and TM's normal and nose and mouth without ulcers or lesions     NECK: no adenopathy, no asymmetry, masses, or scars and thyroid normal to palpation     RESP: lungs clear to auscultation - no rales, rhonchi or wheezes     CV: regular rates and rhythm, normal S1 S2, no S3 or S4 and no murmur, click or rub     ABDOMEN:  soft, nontender, no HSM or masses and bowel sounds normal     MS: extremities normal- no gross deformities noted, no evidence of inflammation in joints, FROM in all extremities.     SKIN: no suspicious lesions or rashes     NEURO: Normal strength and tone, sensory exam grossly normal, mentation intact and speech normal     PSYCH: mentation appears normal. and affect normal/bright     LYMPHATICS: No axillary, cervical, or supraclavicular nodes    DIAGNOSTICS:                                                    EKG: Not indicated due to non-vascular surgery and low risk of event (no recent symptoms or cardiac issues for last 3+ years)    Recent Labs   Lab Test  07/07/16   1109  05/15/14   0824  07/28/13   1108  04/12/13   0115   06/05/12   0745   05/31/12   0718   05/12/12   2356   HGB   --    --   13.1  13.7   < >   --    < >  12.9   < >  13.1   PLT   --    --   212  243   < >   --    < >  207   < >  207   INR   --    --    " --    --    --    --    --   1.11   --   1.11   NA  139  142  141  140   < >   --    < >  141   < >   --    POTASSIUM  4.3  4.4  4.4  4.0   < >   --    < >  4.4   < >   --    CR  0.87  0.92  0.75  0.93   < >   --    < >  0.88   < >   --    A1C   --    --    --    --    --   5.7   --    --    --    --     < > = values in this interval not displayed.        IMPRESSION:                                                    Reason for surgery/procedure: Tibia fracture  Diagnosis/reason for consult:    Preop general physical exam  Tibial plateau fracture, right, closed, with routine healing, subsequent encounter  Osteoarthritis of right knee, unspecified osteoarthritis type  Nasal polyp  Hiatal hernia  Colon polyps  Gastroesophageal reflux disease without esophagitis  Hyperlipidemia LDL goal <100  Advanced directives, counseling/discussion  Mild intermittent asthma with exacerbation  Chronic sinusitis, unspecified location  Non-ST elevation myocardial infarction (NSTEMI), initial care episode (H)  Coronary artery disease involving native heart with angina pectoris, unspecified vessel or lesion type (H)  BCC (basal cell carcinoma), face  Anxiety  Osteoporosis      The proposed surgical procedure is considered INTERMEDIATE risk.    REVISED CARDIAC RISK INDEX  The patient has the following serious cardiovascular risks for perioperative complications such as (MI, PE, VFib and 3  AV Block):  Coronary Artery Disease (MI, positive stress test, angina, Qs on EKG)  INTERPRETATION: 1 risks: Class II (low risk - 0.9% complication rate)    The patient has the following additional risks for perioperative complications:  No identified additional risks    No diagnosis found.    RECOMMENDATIONS:                                                          --Patient is to take all scheduled medications on the day of surgery EXCEPT for modifications listed below.  We discussed all of her medications and I answered her questions about taking  these. She is scheduled into the surgery schedule tomorrow because of a cancellation so she has been taking her aspirin up until today. I told her to stop taking that tomorrow morning. Her other medications should be okay to continue.    APPROVAL GIVEN to proceed with proposed procedure, without further diagnostic evaluation       Signed Electronically by: Devin Yoo MD    Copy of this evaluation report is provided to requesting physician.    [facility] Preop Guidelines

## 2017-06-01 NOTE — ANESTHESIA PROCEDURE NOTES
Peripheral nerve/Neuraxial procedure note : intrathecal  Pre-Procedure  Performed by  JANETH RODGERS   Location: OR      Pre-Anesthestic Checklist: patient identified, IV checked, risks and benefits discussed, informed consent, monitors and equipment checked and pre-op evaluation    Timeout  Correct Patient: Yes   Correct Procedure: Yes   Correct Site: Yes   Correct Laterality: N/A   Correct Position: Yes   Site Marked: N/A   .   Procedure Documentation  ASA 3  .    Procedure:    Intrathecal.  Insertion Site:L4-5  (midline approach)      Patient Prep;mask, sterile gloves, povidone-iodine 7.5% surgical scrub, patient draped.  .  Needle: (). # of attempts: 1. # of redirects:. Spinal Needle: Magdalena tip 22 G. 3.5 in.  No introducer used. . .     Assessment/Narrative  Paresthesias: No.  .  .  clear CSF fluid removed . Sensory Level: T6

## 2017-06-01 NOTE — IP AVS SNAPSHOT
` ` Patient Information     Patient Name Sex     Anna Dahl ([id number]) Female 1950       Room Bed    [address] [facility]      Patient Demographics     Address Phone E-mail Address    [address] [address] [phone] (Home)  [phone] (Mobile) [email or web address]      Patient Ethnicity & Race     Ethnic Group Patient Race    American White      Emergency Contact(s)     Name Relation Home Work Mobile    FAVIAN DAHL Spouse [phone]    ISAIAS DAHL Son [phone]      Documents on File        Status Date Received Description       Documents for the Patient    Privacy Notice - [place] Received 10/22/10     Face Sheet  () 04     Insurance Card  04 [employer]    Face Sheet  () 06     Consent Form  06     Consent Form  06     Insurance Card  07     Consent Form  08     Face Sheet Received () 09     Insurance Card Received 10/25/09 medica    External Medication Information Consent Accepted () 12/28/10     Face Sheet Received () 08/09/10     Patient ID Received      Consent for Services - Hospital/Clinic Received () 12/28/10     Consent for Services - Hospital/Clinic Received () 11     External Medication Information Consent Accepted () 12     Consent for Services - Hospital/Clinic Received () 12     HIM YAW Authorization  06/10/12     External Medication Information Consent Accepted 13     Insurance Card Received 13 [employer]    Consent for EHR Access  13 Copied from existing Consent for services - C/HOD collected on 2012    [facility] Specified Other       Consent for Services - Hospital/Clinic Received () 13     Consent for Services - Hospital/Clinic  () 13 CONSENT FOR SERVICE, 2013    Consent for Services - Hospital/Clinic  () 14 CONSENT FOR SERVICE    Consent  for Services - Hospital/Clinic Received () 10/09/14     Insurance Card Received 12/15/14 [id number]    External Medication Information Consent Accepted 12/15/14     Consent for Services - Hospital/Clinic Received () 12/15/14     Insurance Card Received 04/13/15 medica    HIM YAW Authorization - File Only  12/01/15 MYCHART ACCESS    Consent for Services/Privacy Notice - Hospital/Clinic Received 16     Insurance Card Received 16 [id number]       Documents for the Encounter    CMS IM for Patient Signature Received 17     Discharge Attachment   OXYCODONE HYDROCHLORIDE ORAL TABLET (ENGLISH)    Discharge Attachment   CYCLOBENZAPRINE HYDROCHLORIDE ORAL TABLET (ENGLISH)    Discharge Attachment   HYDROXYZINE HYDROCHLORIDE ORAL TABLET (ENGLISH)      Admission Information     Attending Provider Admitting Provider Admission Type Admission Date/Time    Colin Krause MD Peterson, Erik Jordan, MD Elective 17  1334    Discharge Date Hospital Service Auth/Cert Status Service Area     [facility]    Unit Room/Bed Admission Status       [facility] /[facility]10 Admission (Confirmed)       Admission     Complaint    Right knee arthritis, S/P total knee arthroplasty      Hospital Account     Name Acct ID Class Status Primary Coverage    Anna Dahl [id number] Inpatient Open MEDICARE - MEDICARE FOR HB SUPPLEMENT            Guarantor Account (for Hospital Account #[id number])     Name Relation to Pt Service Area Active? Acct Type    Anna Dahl  FCS Yes Personal/Family    Address Phone          [address] [address] [phone](H)              Coverage Information (for Hospital Account #[id number])     1. MEDICARE/MEDICARE FOR HB SUPPLEMENT     F/O Payor/Plan Precert #    MEDICARE/MEDICARE FOR HB SUPPLEMENT     Subscriber Subscriber #    Anna Dahl [id number]    Address Phone    ATTN CLAIMS  [address]  [place] [address] [phone]          2. MEDICA/MEDICA PRIME SOLUTION     F/O Payor/Plan Precert #    MEDICA/MEDICA PRIME SOLUTION     Subscriber Subscriber #    Anna Dahl [id number]    Address Phone    [address] [address]  [place] [address] [phone]

## 2017-06-01 NOTE — IP AVS SNAPSHOT
[facility]    [address] [facility] [address]    Phone:  [phone]    Fax:  [phone]                                       After Visit Summary   6/1/2017    Anna Dahl    MRN: [id number]           After Visit Summary Signature Page     I have received my discharge instructions, and my questions have been answered. I have discussed any challenges I see with this plan with the nurse or doctor.    ..........................................................................................................................................  Patient/Patient Representative Signature      ..........................................................................................................................................  Patient Representative Print Name and Relationship to Patient    ..................................................               ................................................  Date                                            Time    ..........................................................................................................................................  Reviewed by Signature/Title    ...................................................              ..............................................  Date                                                            Time

## 2017-06-01 NOTE — LETTER
Transition Communication Hand-off for Care Transitions to Next Level of Care Provider    Name: Anna Dahl  MRN #: [id number]  Primary Care Provider: Nadia Ramirez  Primary Care MD Name: Dr. Ramirez  Primary Clinic: [facility] [address]  [facility] [address]  Primary Care Clinic Name: [facility]  Reason for Hospitalization:  Right knee arthritis  S/P total knee arthroplasty  Admit Date/Time: 6/1/2017  1:34 PM  Discharge Date: 6/4/17  Payor Source: Payor: MEDICA / Plan: MEDICA PRIME SOLUTION / Product Type: Indemnity /     Readmission Assessment Measure (RAS) Risk Score/category: Low    Reason for Communication Hand-off Referral:  [facility]    Discharge Plan: Home with [facility] ([phone] Fax: [phone])       Concern for non-adherence with plan of care:   Y/N No  Discharge Needs Assessment:  Needs       Most Recent Value    # of Referrals Placed by [facility]          Already enrolled in Tele-monitoring program and name of program:  N/A  Follow-up specialty is recommended: No    Follow-up plan:  Future Appointments  Date Time Provider Department Center   6/4/2017 3:00 PM Naomy Mcmillan PT [facility] [place] GABBI Sapp RN, Care Coordinator    AVS/Discharge Summary is the source of truth; this is a helpful guide for improved communication of patient story

## 2017-06-02 ENCOUNTER — ANESTHESIA (OUTPATIENT)
Dept: MEDSURG UNIT | Facility: CLINIC | Age: 67
DRG: 470 | End: 2017-06-02
Payer: MEDICARE

## 2017-06-02 ENCOUNTER — APPOINTMENT (OUTPATIENT)
Dept: PHYSICAL THERAPY | Facility: CLINIC | Age: 67
DRG: 470 | End: 2017-06-02
Attending: ORTHOPAEDIC SURGERY
Payer: MEDICARE

## 2017-06-02 ENCOUNTER — ANESTHESIA EVENT (OUTPATIENT)
Dept: MEDSURG UNIT | Facility: CLINIC | Age: 67
DRG: 470 | End: 2017-06-02
Payer: MEDICARE

## 2017-06-02 PROBLEM — R73.09 ELEVATED GLUCOSE: Status: ACTIVE | Noted: 2017-06-02

## 2017-06-02 LAB
ANION GAP SERPL CALCULATED.3IONS-SCNC: 9 MMOL/L (ref 3–14)
BUN SERPL-MCNC: 16 MG/DL (ref 7–30)
CALCIUM SERPL-MCNC: 8.5 MG/DL (ref 8.5–10.1)
CHLORIDE SERPL-SCNC: 104 MMOL/L (ref 94–109)
CO2 SERPL-SCNC: 24 MMOL/L (ref 20–32)
CREAT SERPL-MCNC: 0.86 MG/DL (ref 0.52–1.04)
GFR SERPL CREATININE-BSD FRML MDRD: 66 ML/MIN/1.7M2
GLUCOSE SERPL-MCNC: 144 MG/DL (ref 70–99)
HBA1C MFR BLD: 5.6 % (ref 4.3–6)
HGB BLD-MCNC: 10.8 G/DL (ref 11.7–15.7)
POTASSIUM SERPL-SCNC: 4.4 MMOL/L (ref 3.4–5.3)
SODIUM SERPL-SCNC: 137 MMOL/L (ref 133–144)

## 2017-06-02 PROCEDURE — 25900017 H RX MED GY IP 259 OP 259 PS 637: Mod: GY | Performed by: ORTHOPAEDIC SURGERY

## 2017-06-02 PROCEDURE — 99232 SBSQ HOSP IP/OBS MODERATE 35: CPT | Performed by: PHYSICIAN ASSISTANT

## 2017-06-02 PROCEDURE — 80048 BASIC METABOLIC PNL TOTAL CA: CPT | Performed by: ORTHOPAEDIC SURGERY

## 2017-06-02 PROCEDURE — A9270 NON-COVERED ITEM OR SERVICE: HCPCS | Mod: GY | Performed by: ORTHOPAEDIC SURGERY

## 2017-06-02 PROCEDURE — 97116 GAIT TRAINING THERAPY: CPT | Mod: GP

## 2017-06-02 PROCEDURE — A9270 NON-COVERED ITEM OR SERVICE: HCPCS | Mod: GY | Performed by: PHYSICIAN ASSISTANT

## 2017-06-02 PROCEDURE — 25000128 H RX IP 250 OP 636: Performed by: PHYSICIAN ASSISTANT

## 2017-06-02 PROCEDURE — 25000128 H RX IP 250 OP 636: Performed by: ORTHOPAEDIC SURGERY

## 2017-06-02 PROCEDURE — 25000132 ZZH RX MED GY IP 250 OP 250 PS 637: Mod: GY | Performed by: ORTHOPAEDIC SURGERY

## 2017-06-02 PROCEDURE — 83036 HEMOGLOBIN GLYCOSYLATED A1C: CPT | Performed by: PHYSICIAN ASSISTANT

## 2017-06-02 PROCEDURE — 36415 COLL VENOUS BLD VENIPUNCTURE: CPT | Performed by: ORTHOPAEDIC SURGERY

## 2017-06-02 PROCEDURE — S0077 INJECTION, CLINDAMYCIN PHOSP: HCPCS | Performed by: ORTHOPAEDIC SURGERY

## 2017-06-02 PROCEDURE — 12000007 ZZH R&B INTERMEDIATE

## 2017-06-02 PROCEDURE — 97161 PT EVAL LOW COMPLEX 20 MIN: CPT | Mod: GP

## 2017-06-02 PROCEDURE — 85018 HEMOGLOBIN: CPT | Performed by: ORTHOPAEDIC SURGERY

## 2017-06-02 PROCEDURE — 25000132 ZZH RX MED GY IP 250 OP 250 PS 637: Mod: GY | Performed by: PHYSICIAN ASSISTANT

## 2017-06-02 PROCEDURE — 25000125 ZZHC RX 250: Performed by: ORTHOPAEDIC SURGERY

## 2017-06-02 PROCEDURE — 25000125 ZZHC RX 250: Performed by: NURSE ANESTHETIST, CERTIFIED REGISTERED

## 2017-06-02 PROCEDURE — 97110 THERAPEUTIC EXERCISES: CPT | Mod: GP

## 2017-06-02 PROCEDURE — 40000193 ZZH STATISTIC PT WARD VISIT

## 2017-06-02 PROCEDURE — 25000128 H RX IP 250 OP 636: Performed by: NURSE ANESTHETIST, CERTIFIED REGISTERED

## 2017-06-02 RX ORDER — ROPIVACAINE HYDROCHLORIDE 5 MG/ML
INJECTION, SOLUTION EPIDURAL; INFILTRATION; PERINEURAL PRN
Status: DISCONTINUED | OUTPATIENT
Start: 2017-06-02 | End: 2019-03-21

## 2017-06-02 RX ORDER — AMOXICILLIN 250 MG
1-2 CAPSULE ORAL 2 TIMES DAILY
Qty: 100 TABLET | Refills: 1 | Status: SHIPPED | OUTPATIENT
Start: 2017-06-02 | End: 2019-05-20

## 2017-06-02 RX ORDER — CYCLOBENZAPRINE HCL 5 MG
5 TABLET ORAL 3 TIMES DAILY
Status: DISCONTINUED | OUTPATIENT
Start: 2017-06-02 | End: 2017-06-04 | Stop reason: HOSPADM

## 2017-06-02 RX ORDER — HYDROXYZINE HYDROCHLORIDE 25 MG/1
25-50 TABLET, FILM COATED ORAL EVERY 6 HOURS PRN
Status: DISCONTINUED | OUTPATIENT
Start: 2017-06-02 | End: 2017-06-02

## 2017-06-02 RX ORDER — LIDOCAINE HCL/EPINEPHRINE/PF 2%-1:200K
VIAL (ML) INJECTION PRN
Status: DISCONTINUED | OUTPATIENT
Start: 2017-06-02 | End: 2019-03-21

## 2017-06-02 RX ORDER — OXYCODONE HYDROCHLORIDE 5 MG/1
5-10 TABLET ORAL
Status: DISCONTINUED | OUTPATIENT
Start: 2017-06-02 | End: 2017-06-04 | Stop reason: HOSPADM

## 2017-06-02 RX ORDER — LIDOCAINE HYDROCHLORIDE 10 MG/ML
INJECTION, SOLUTION EPIDURAL; INFILTRATION; INTRACAUDAL; PERINEURAL PRN
Status: DISCONTINUED | OUTPATIENT
Start: 2017-06-02 | End: 2019-03-21

## 2017-06-02 RX ORDER — HYDROXYZINE HYDROCHLORIDE 25 MG/1
25-50 TABLET, FILM COATED ORAL EVERY 4 HOURS PRN
Status: DISCONTINUED | OUTPATIENT
Start: 2017-06-02 | End: 2017-06-04 | Stop reason: HOSPADM

## 2017-06-02 RX ORDER — OXYCODONE HYDROCHLORIDE 5 MG/1
5-10 TABLET ORAL EVERY 4 HOURS PRN
Qty: 60 TABLET | Refills: 0 | Status: SHIPPED | OUTPATIENT
Start: 2017-06-02 | End: 2017-06-04

## 2017-06-02 RX ORDER — HYDROXYZINE HYDROCHLORIDE 25 MG/1
25 TABLET, FILM COATED ORAL EVERY 6 HOURS PRN
Qty: 60 TABLET | Refills: 0 | Status: SHIPPED | OUTPATIENT
Start: 2017-06-02 | End: 2017-06-04

## 2017-06-02 RX ORDER — ASPIRIN 325 MG
325 TABLET, DELAYED RELEASE (ENTERIC COATED) ORAL DAILY
Qty: 40 TABLET | Refills: 0 | Status: SHIPPED | OUTPATIENT
Start: 2017-06-02 | End: 2019-05-20 | Stop reason: DRUGHIGH

## 2017-06-02 RX ORDER — KETOROLAC TROMETHAMINE 15 MG/ML
15 INJECTION, SOLUTION INTRAMUSCULAR; INTRAVENOUS EVERY 6 HOURS
Status: DISCONTINUED | OUTPATIENT
Start: 2017-06-02 | End: 2017-06-03

## 2017-06-02 RX ADMIN — LIDOCAINE HYDROCHLORIDE,EPINEPHRINE BITARTRATE 5 ML: 20; .005 INJECTION, SOLUTION EPIDURAL; INFILTRATION; INTRACAUDAL; PERINEURAL at 17:38

## 2017-06-02 RX ADMIN — FLUTICASONE PROPIONATE 2 SPRAY: 50 SPRAY, METERED NASAL at 08:29

## 2017-06-02 RX ADMIN — OXYCODONE HYDROCHLORIDE 10 MG: 5 TABLET ORAL at 21:13

## 2017-06-02 RX ADMIN — ACETAMINOPHEN 975 MG: 325 TABLET, FILM COATED ORAL at 21:12

## 2017-06-02 RX ADMIN — FLUTICASONE FUROATE 1 PUFF: 100 POWDER RESPIRATORY (INHALATION) at 08:25

## 2017-06-02 RX ADMIN — SENNOSIDES AND DOCUSATE SODIUM 2 TABLET: 8.6; 5 TABLET ORAL at 21:13

## 2017-06-02 RX ADMIN — SERTRALINE HYDROCHLORIDE 25 MG: 25 TABLET, FILM COATED ORAL at 08:20

## 2017-06-02 RX ADMIN — SUCRALFATE 1 G: 1 TABLET ORAL at 11:48

## 2017-06-02 RX ADMIN — SUCRALFATE 1 G: 1 TABLET ORAL at 08:17

## 2017-06-02 RX ADMIN — CYCLOBENZAPRINE 5 MG: 5 TABLET, FILM COATED ORAL at 14:24

## 2017-06-02 RX ADMIN — SENNOSIDES AND DOCUSATE SODIUM 2 TABLET: 8.6; 5 TABLET ORAL at 08:18

## 2017-06-02 RX ADMIN — HYDROMORPHONE HYDROCHLORIDE 0.5 MG: 1 INJECTION, SOLUTION INTRAMUSCULAR; INTRAVENOUS; SUBCUTANEOUS at 14:25

## 2017-06-02 RX ADMIN — MONTELUKAST 10 MG: 10 TABLET, FILM COATED ORAL at 21:12

## 2017-06-02 RX ADMIN — OXYCODONE HYDROCHLORIDE 10 MG: 5 TABLET ORAL at 02:14

## 2017-06-02 RX ADMIN — HYDROXYZINE HYDROCHLORIDE 50 MG: 25 TABLET ORAL at 16:03

## 2017-06-02 RX ADMIN — LISINOPRIL 5 MG: 5 TABLET ORAL at 08:18

## 2017-06-02 RX ADMIN — LIDOCAINE HYDROCHLORIDE 5 ML: 10 INJECTION, SOLUTION EPIDURAL; INFILTRATION; INTRACAUDAL; PERINEURAL at 17:35

## 2017-06-02 RX ADMIN — OXYCODONE HYDROCHLORIDE 10 MG: 5 TABLET ORAL at 09:55

## 2017-06-02 RX ADMIN — CLINDAMYCIN PHOSPHATE 900 MG: 18 INJECTION, SOLUTION INTRAVENOUS at 08:29

## 2017-06-02 RX ADMIN — HYDROXYZINE HYDROCHLORIDE 25 MG: 25 TABLET ORAL at 04:42

## 2017-06-02 RX ADMIN — HYDROXYZINE HYDROCHLORIDE 25 MG: 25 TABLET ORAL at 11:48

## 2017-06-02 RX ADMIN — ACETAMINOPHEN 975 MG: 325 TABLET, FILM COATED ORAL at 13:37

## 2017-06-02 RX ADMIN — SUCRALFATE 1 G: 1 TABLET ORAL at 21:20

## 2017-06-02 RX ADMIN — OXYCODONE HYDROCHLORIDE 10 MG: 5 TABLET ORAL at 05:46

## 2017-06-02 RX ADMIN — KETOROLAC TROMETHAMINE 15 MG: 15 INJECTION, SOLUTION INTRAMUSCULAR; INTRAVENOUS at 17:36

## 2017-06-02 RX ADMIN — OXYCODONE HYDROCHLORIDE 10 MG: 5 TABLET ORAL at 13:37

## 2017-06-02 RX ADMIN — CYCLOBENZAPRINE 5 MG: 5 TABLET, FILM COATED ORAL at 08:41

## 2017-06-02 RX ADMIN — OMEPRAZOLE 40 MG: 20 CAPSULE, DELAYED RELEASE ORAL at 21:12

## 2017-06-02 RX ADMIN — CLINDAMYCIN PHOSPHATE 900 MG: 18 INJECTION, SOLUTION INTRAVENOUS at 01:05

## 2017-06-02 RX ADMIN — ROPIVACAINE HYDROCHLORIDE 20 ML: 5 INJECTION, SOLUTION EPIDURAL; INFILTRATION; PERINEURAL at 17:39

## 2017-06-02 RX ADMIN — OXYCODONE HYDROCHLORIDE 10 MG: 5 TABLET ORAL at 17:41

## 2017-06-02 RX ADMIN — ASPIRIN 325 MG: 325 TABLET, COATED ORAL at 08:17

## 2017-06-02 RX ADMIN — METOPROLOL SUCCINATE 25 MG: 25 TABLET, EXTENDED RELEASE ORAL at 08:17

## 2017-06-02 RX ADMIN — CYCLOBENZAPRINE 5 MG: 5 TABLET, FILM COATED ORAL at 21:12

## 2017-06-02 RX ADMIN — ACETAMINOPHEN 975 MG: 325 TABLET, FILM COATED ORAL at 04:43

## 2017-06-02 RX ADMIN — OMEPRAZOLE 40 MG: 20 CAPSULE, DELAYED RELEASE ORAL at 08:41

## 2017-06-02 RX ADMIN — ATORVASTATIN CALCIUM 40 MG: 20 TABLET, FILM COATED ORAL at 08:17

## 2017-06-02 RX ADMIN — SUCRALFATE 1 G: 1 TABLET ORAL at 17:41

## 2017-06-02 RX ADMIN — KETOROLAC TROMETHAMINE 15 MG: 15 INJECTION, SOLUTION INTRAMUSCULAR; INTRAVENOUS at 11:44

## 2017-06-02 NOTE — PLAN OF CARE
"Problem: Knee Replacement, Total (Adult)  Goal: Signs and Symptoms of Listed Potential Problems Will be Absent or Manageable (Knee Replacement, Total)  Signs and symptoms of listed potential problems will be absent or manageable by discharge/transition of care (reference Knee Replacement, Total (Adult) CPG).   Outcome: No Change  Patient incontinent when arrived on floor  \"I have no feeling, I did not know I was going.\"  Patient fell off 4 foot step ladder, patient has multiple bruises.      "

## 2017-06-02 NOTE — CONSULTS
Care Transition Initial Assessment - RN  Reason For Consult: discharge planning   Met with: Patient and spouse.    DATA   Principal Problem:    S/P total knee arthroplasty  Active Problems:    GERD (gastroesophageal reflux disease)    Hyperlipidemia LDL goal <100    Mild intermittent asthma with exacerbation    CAD (coronary artery disease)    Anxiety    Elevated glucose       [facility]  Primary Care MD Name: Dr. Ramirez  Contact information and PCP information verified: Yes      ASSESSMENT  Cognitive Status: awake, alert and oriented.        Lives With: spouse  Living Arrangements: house     Description of Support System: Supportive, Involved   Who is your support system?:    Support Assessment: Adequate family and caregiver support   Insurance Concerns: No Insurance issues identified          This writer met with pt and spouse, introduced self and role.  Discussed home care, Pt was provided with Medicare certified home care list. Pt chooses to use [facility] ([phone] Fax: [phone]).  A referral was made to home care.      PLAN    Home with [facility]      Ghada Sapp RN, Care Coordinator [phone]

## 2017-06-02 NOTE — ANESTHESIA PREPROCEDURE EVALUATION
Anesthesia Evaluation     . Pt has had prior anesthetic. Type: General and MAC    No history of anesthetic complications          ROS/MED HX    ENT/Pulmonary:     (+)Mild Persistent asthma Treatment: Inhaler prn and Inhaled steroids,  , . .    Neurologic:  - neg neurologic ROS     Cardiovascular: Comment: CAD (coronary artery disease) 05/14/2012       Priority: High      NSTEMI May 2012      EF 35%  PCI to mid-LAD vessel and first diagonal.  Plan to return at end of May 2012 for staged stenting of RCA  Rx Effient for one year, start Crestor    Pt. States she has 6 stents, outside records not available        (+) Dyslipidemia, --CAD, -past MI,-stent,6 . : . . . :. . Previous cardiac testing Echodate:results:Interpretation Summary  Normal stress echocardiogram. No stress induced regional wall motion   abnormalities. Normal resting LV function with EF of approximately 60-65%;   normal response to exercise with increase to approximately 70-75%.   Appropriate decrease of LV size and volume. No ECG evidence of ischemia. No   subjective evidence of ischemia. Normal functional capacity. No significant   valvular disease noted on routine screening color flow Doppler and pulsed   Doppler examination  PatientHeight: 65 in  PatientWeight: 170 lbs  SystolicPressure: 144 mmHg  DiastolicPressure: 86 mmHg  HeartRate: 53 bpm  BSA 1.8 m^2     Stress Findings  Maximum workload 125 sands.  Peak MVO2 19.4 ml/kg/min .  Percent predicted  MVO2 77 %.  RPP 22,576.  Patient was given 3ml of Optison.  Optison Expiration  5/28/14 .  Optison Lot # [id number] .     Aortic Valve  The aortic valve is normal in structure and function.     Mitral Valve  The mitral valve is normal in structure and function.     Tricuspid Valve  The tricuspid valve is normal.     Procedure:  Stress Echo Bike with two dimensional, color and spectral Doppler performed.  Contrast Optison.     MMode 2D Measurements & Calculations  asc Aorta: 3.3 cm  Doppler Measurements &  Calculations  MV E point: 74 cm/sec  MV A point: 70 cm/sec  MV E/A: 1.1   Ao V2 max: 116 cm/sec  Ao max P.0 mmHg     Stress Results  Protocol:  Bike Stress Echo    Target HR: 133 bpm            Maximum Predicted HR: 157 bpm      Stage        Duration(mm:ss)  HR(bpm)   BP        DOSE  Comment     Baseline       00:00          53        144/86                      Peak           07:00          136       166/95                          Stress Duration: 07:00 mm:ss  Recovery Time: 00:00 mm:ss    Maximum Stress HR: 136 bpm    METS:       Interpreting Physician:  Daisy Leon,  electronically signed on   04- 12:55:46date: results: date: results: date: results:          METS/Exercise Tolerance: Comment: Limited by knee pain 4 - Raking leaves, gardening   Hematologic:  - neg hematologic  ROS       Musculoskeletal:   (+) arthritis, , , -       GI/Hepatic:     (+) GERD Asymptomatic on medication, hiatal hernia, Other GI/Hepatic nasal/colon polyps      Renal/Genitourinary:     (+) Nephrolithiasis ,       Endo:     (+) Obesity, .      Psychiatric:     (+) psychiatric history anxiety      Infectious Disease:  - neg infectious disease ROS       Malignancy:   (+) Malignancy History of Skin          Other:    - neg other ROS                 Physical Exam  Normal systems: cardiovascular and pulmonary    Airway   Mallampati: II  TM distance: >3 FB  Neck ROM: full    Dental   (+) upper dentures and lower dentures    Cardiovascular   Rhythm and rate: regular and normal      Pulmonary    breath sounds clear to auscultation                        Anesthesia Plan      History & Physical Review  History and physical reviewed and following examination; no interval change.    ASA Status:  3 .    NPO Status:  > 8 hours    Plan for Periph. Nerve Block for postop pain     Asked by Ortho service for possible repeat block due to pain. Discussed with patient and family, desires repeat block. Repeat  Right adductor canal block post-op  for moderate to severe pain.       Postoperative Care  Postoperative pain management:  IV analgesics, Oral pain medications and Peripheral nerve block (Single Shot).      Consents  Anesthetic plan, risks, benefits and alternatives discussed with:  Patient..                          .

## 2017-06-02 NOTE — PLAN OF CARE
Multiple bruisin) Large deep purple on right midline buttocks  2) Bilateral Hips  3) Right knee frx with fall, however ball of right foot bruised  4) Left inner lateral foot  5) Left leg  Patient must have tape or sensitivity from tele electrode patches  On chest area

## 2017-06-02 NOTE — PLAN OF CARE
OT order received. Pt in severe pain eval W. Pt getting a spinal block? This afternoon, will reassess tomorrow

## 2017-06-02 NOTE — PROGRESS NOTES
Goal Outcome Summary    Pt transfers with min A of 1, amb with RW with CGA 3' from bed to chair. Pt limited by high level of pain. Pt rates pain at 9/10. She reports the pain meds are not helping. Performs LE exs with assist x 10. Pt exhibits lots of pain behavior.    REC: TCU or Home with HC pending progress in PT and better pain management.    Physical Therapy Evaluation     06/02/17 1000   Quick Adds   Type of Visit Initial PT Evaluation   Living Environment   Lives With spouse   Living Arrangements house   Home Accessibility stairs to enter home   Number of Stairs to Enter Home 3   Living Environment Comment Pt's spouse reports he will take pt in w/c around to back of [facility] and bring her in the sliding door. Once she is in the house, the bedroom, bathroom and kitchen are on the same level.   Functional Level Prior   Ambulation 0-->independent   Transferring 0-->independent   Toileting 0-->independent   Bathing 0-->independent   Dressing 0-->independent   Eating 0-->independent   Communication 0-->understands/communicates without difficulty   Swallowing 0-->swallows foods/liquids without difficulty   Cognition 0 - no cognition issues reported   Fall history within last six months yes   Number of times patient has fallen within last six months 1   Which of the above functional risks had a recent onset or change? ambulation   General Information   Onset of Illness/Injury or Date of Surgery - Date 06/01/17   Referring Physician Dr Krause   Patient/Family Goals Statement wants to return home to independent living   Pertinent History of Current Problem (include personal factors and/or comorbidities that impact the POC) Pt fell 4 feet off a ladder while putting chairs into her pontoon 2 weeks ago and sustained a R knee fx. She underwent surgery for a R TKA 6/1/17.   Weight-Bearing Status - RLE full weight-bearing   Cognitive Status Examination   Orientation orientation to person, place and time   Level of  "Consciousness alert   Follows Commands and Answers Questions 100% of the time   Personal Safety and Judgment intact   Pain Assessment   Patient Currently in Pain Yes, see Vital Sign flowsheet  (R knee pain rated at 8/10 at rest, 9/10 with mvmt and amb)   Range of Motion (ROM)   ROM Quick Adds Knee, Right   Right Knee Extension/Flexion ROM   Right Knee Extension/Flexion PROM - degrees lacking 11* ext, 40* flex   Strength   Strength Comments WFL except R LE less than antigravity   Bed Mobility   Bed Mobility Comments sit <> supine with min assist of 1   Transfer Skills   Transfer Comments sit <> stand with min to CGA of 1   Gait   Gait Gait Skill   Gait Skills   Level of Ashley: Gait contact guard   Physical Assist/Nonphysical Assist: Gait 1 person assist   Weight-Bearing Restrictions: Gait weight-bearing as tolerated   Assistive Device for Transfer: Gait rolling walker   Gait Distance bed to chair   Balance   Balance Comments good with RW   General Therapy Interventions   Planned Therapy Interventions bed mobility training;gait training;ROM;strengthening;stretching;transfer training   Clinical Impression   Criteria for Skilled Therapeutic Intervention yes, treatment indicated   PT Diagnosis R TKA   Influenced by the following impairments pain and weakness   Functional limitations due to impairments mobility and gait   Clinical Presentation Stable/Uncomplicated   Clinical Presentation Rationale clinical judgement   Clinical Decision Making (Complexity) Low complexity   Therapy Frequency` 2 times/day   Predicted Duration of Therapy Intervention (days/wks) 3 days   Anticipated Discharge Disposition Transitional Care Facility;Home with Home Therapy   Risk & Benefits of therapy have been explained Yes   Patient, Family & other staff in agreement with plan of care Yes   [facility] AM-PAC TM \"6 Clicks\"   2016, Trustees of [facility], under license to [employer].  All rights reserved.   6 Clicks Short " "Forms Basic Mobility Inpatient Short Form   [facility] AM-PAC  \"6 Clicks\" V.2 Basic Mobility Inpatient Short Form   1. Turning from your back to your side while in a flat bed without using bedrails? 3 - A Little   2. Moving from lying on your back to sitting on the side of a flat bed without using bedrails? 3 - A Little   3. Moving to and from a bed to a chair (including a wheelchair)? 3 - A Little   4. Standing up from a chair using your arms (e.g., wheelchair, or bedside chair)? 3 - A Little   5. To walk in hospital room? 3 - A Little   6. Climbing 3-5 steps with a railing? 1 - Total   Basic Mobility Raw Score (Score out of 24.Lower scores equate to lower levels of function) 16   Total Evaluation Time   Total Evaluation Time (Minutes) 15     See Care Plan for Goals.    Nury Reza PT      "

## 2017-06-02 NOTE — PLAN OF CARE
Problem: Knee Replacement, Total (Adult)  Goal: Signs and Symptoms of Listed Potential Problems Will be Absent or Manageable (Knee Replacement, Total)  Signs and symptoms of listed potential problems will be absent or manageable by discharge/transition of care (reference Knee Replacement, Total (Adult) CPG).   Outcome: No Change  Anesthesia came to give patient a pain block into right knee.  Ace wrap removed from RLE.  Tolerated procedure well.  Pre-procedure BP 99/57, pulse 63 and oxygen saturation 96 % room air.  BP post procedure was 115/56, pulse 63 and oxygen saturation 97 % room air.  Band-aid applied to injection site.

## 2017-06-02 NOTE — OR NURSING
Xray completed, pt complained of discomfort with movement, less at rest.  Will medicate as noted in MAR.  Will also bring  to bedside shortly as he anxiously awaits in waiting room just outside of ICU/PACU door.

## 2017-06-02 NOTE — PROGRESS NOTES
"[facility] Orthopaedics Progress Note      Post-operative Day: 1 Day Post-Op    Procedure(s):  Right total knee arthroplasty - Wound Class: I-Clean      Subjective:    Pain: severe  aching, dull and throbbing to R knee. Denies shooting pain, parasthesias, numbness and weakness.   denies Chest pain, SOB, O2 required: None  denies nausea/ emesis  denies lightheadedness/ dizziness         Objective:  Blood pressure 119/63, pulse 56, temperature 97.2  F (36.2  C), temperature source Oral, resp. rate 16, height 1.543 m (5' 0.75\"), weight 96.5 kg (212 lb 11.9 oz), SpO2 94 %.    Patient Vitals for the past 24 hrs:   BP Temp Temp src Pulse Resp SpO2 Height Weight   06/02/17 1010 - - - 56 - 94 % - -   06/02/17 0822 - - - 71 16 95 % - -   06/02/17 0700 119/63 97.2  F (36.2  C) Oral 56 16 94 % - -   06/02/17 0542 - - - - - 92 % - -   06/02/17 0333 102/52 96.1  F (35.6  C) Oral 50 16 96 % - -   06/02/17 0221 119/58 - - 53 - - - -   06/01/17 2341 - 96.6  F (35.9  C) Oral - 16 95 % - -   06/01/17 2308 122/65 - - 63 16 92 % - -   06/01/17 2246 123/64 - - (!) 48 16 93 % - -   06/01/17 2230 127/72 - - 62 16 97 % - -   06/01/17 2215 130/61 - - 68 16 97 % - -   06/01/17 2200 120/64 - - 69 16 96 % - -   06/01/17 2145 145/69 - - 69 - 93 % - -   06/01/17 2122 141/78 96.9  F (36.1  C) Oral 54 16 96 % 1.543 m (5' 0.75\") 96.5 kg (212 lb 11.9 oz)   06/01/17 2030 138/75 - - 53 16 - - -   06/01/17 2015 139/74 96.7  F (35.9  C) Oral (!) 46 16 95 % - -   06/01/17 2000 147/85 - - 51 16 96 % - -   06/01/17 1945 140/78 - - 60 16 97 % - -   06/01/17 1936 120/81 96.6  F (35.9  C) Oral 67 16 92 % - -   06/01/17 1347 136/74 98.3  F (36.8  C) Oral 56 18 95 % 1.645 m (5' 4.75\") 88.5 kg (195 lb)       Wt Readings from Last 4 Encounters:   06/01/17 96.5 kg (212 lb 11.9 oz)   05/31/17 88.5 kg (195 lb)   05/19/17 88.5 kg (195 lb)   05/15/17 88.5 kg (195 lb)     General: Alert and orientated. No apparent distress. Non-labored breathing. Appropriate affect. "   MSK: R knee: dressing Clean, dry, and intact. Skin intact, no ecchymosis, no erythema. tender to palpation to incision site. ROM appropriate for post op. Distal neurovascularly intact. Compartments soft and non-tender. Calf tender to palpation. Homans negative. PF/DF and EHL intact.       Pertinent Labs   Lab Results: personally reviewed.     Recent Labs   Lab Test  06/02/17   0625  07/07/16   1109  05/15/14   0824  07/28/13   1108  04/12/13   0115   02/01/13   1157   05/31/12   0718   05/12/12   2356   05/12/12   1305   INR   --    --    --    --    --    --    --    --   1.11   --   1.11   --   1.06   HGB  10.8*   --    --   13.1  13.7   --   14.1   < >  12.9   < >  13.1   --   13.5   HCT   --    --    --   41.3  42.0   --   44.3   < >  39.2   < >  39.2   --   41.5   MCV   --    --    --   92  92   --   92   < >  89   < >  89   --   91   PLT   --    --    --   212  243   --   251   < >  207   < >  207   --   238   NA  137  139  142  141  140   < >  138   < >  141   < >   --    < >   --     < > = values in this interval not displayed.       Plan: Anticoagulation protocol:  mg daily  x 42  days            Pain medications:  dilaudid, oxycodone, toradol, tylenol and vistaril. Anesthesia consulted for repeat block to her R adductor canal as it likely was unsuccessful post operatively.            Weight bearing status:  WBAT            Disposition:  Home in 1-2 days             Follow up: 2 week with VIOLETA            Continue cares and rehabilitation     Report completed by:  Bree Acosta PA-C  Date: 6/2/2017  Time: 1:38 PM

## 2017-06-02 NOTE — ANESTHESIA PROCEDURE NOTES
Peripheral nerve/Neuraxial procedure note : Adductor canal  Pre-Procedure  Performed by  ADELA CABRAL   Location: OR    Procedure Times:6/1/2017 7:18 PM and 6/1/2017 7:29 PM  Pre-Anesthestic Checklist: patient identified, IV checked, site marked, risks and benefits discussed, informed consent, monitors and equipment checked, pre-op evaluation, at physician/surgeon's request and post-op pain management    Timeout  Correct Patient: Yes   Correct Procedure: Yes   Correct Site: Yes   Correct Laterality: Yes   Correct Position: Yes   Site Marked: Yes   .   Procedure Documentation    .    Procedure:    Adductor canal.  Local skin infiltrated with mL of 1% lidocaine.     Ultrasound used to identify targeted nerve, plexus, or vascular marker and placed a needle adjacent to it., Ultrasound was used to visualize the spread of the anesthetic in close proximity to the above stated nerve. A permanent image is entered into the patient's record.  Patient Prep;mask, sterile gloves, chlorhexidine gluconate and isopropyl alcohol, patient draped.  Nerve Stim: Initial Level 1 mA.  Lowest motor response 0.44 mA..  Needle: insulated, short bevel (20 G. 4 in). .  Spinal Needle: . . Insertion Method: Single Shot.     Assessment/Narrative  Paresthesias: No.  Injection made incrementally with aspirations every 5 mL..  The placement was negative for: blood aspirated, painful injection and site bleeding.  Bolus given via needle. No blood aspirated via catheter.   Secured via.   Complications: none. Test dose of 5 mL lidocaine 2% w/ 1:200,000 epinephrine at 19:23.  Test dose negative for signs of intravascular, subdural or intrathecal injection.

## 2017-06-02 NOTE — ANESTHESIA POSTPROCEDURE EVALUATION
Patient: Anna Dahl    Procedure(s):  Right total knee arthroplasty - Wound Class: I-Clean    Diagnosis:Right knee arthritis  Diagnosis Additional Information: No value filed.    Anesthesia Type:  Spinal    Note:  Anesthesia Post Evaluation    Patient location during evaluation: Bedside  Patient participation: Able to fully participate in evaluation  Level of consciousness: awake and alert and lethargic  Pain management: adequate  Airway patency: patent  Cardiovascular status: acceptable  Respiratory status: acceptable  Hydration status: acceptable  PONV: none     Anesthetic complications: None          Last vitals:  Vitals:    06/01/17 1347 06/01/17 1936 06/01/17 1945   BP: 136/74 120/81 140/78   Pulse: 56 67 60   Resp: 18 16    Temp: 36.8  C (98.3  F) 35.9  C (96.6  F)    SpO2: 95% 92% 97%         Electronically Signed By: Silver Browning CRNA, APRN CRNA  June 1, 2017  7:56 PM

## 2017-06-02 NOTE — PROCEDURES
06/01/17    PREOPERATIVE DIAGNOSES: Impacted right knee lateral tibial plateau fracture in setting of preexisting lateral compartment arthritis  POSTOPERATIVE DIAGNOSIS: Impacted right knee lateral tibial plateau fracture in setting of preexisting lateral compartment arthritis  PROCEDURE: Right total knee arthroplasty.   SURGEON: Colin Krause MD   ASSISTANT: Bree Acosta PA-C The presence of the PA was necessary for safe progression of the case.   ANESTHESIA: Spinal with MAC.   ESTIMATED BLOOD LOSS: 50 mL.   TOURNIQUET TIME: 54 minutes at 250 mmHg.   COMPLICATIONS: None apparent.   DISPOSITION: Stable to PACU.    IMPLANTS USED: DePuy Attune size 6 posterior stabilized femoral component with a size 6 tibial RP component, size 6 by 6mm posterior stabilized RP polyethylene and 35 mm patella.     INDICATIONS: Anna is a 66 year old female who has had progressively worsening lateral knee pain over past couple months.  Unfortunately, she fell about 2 weeks ago sustaining an impacted lateral tibial plateau fracture. We initially treated this nonoperatively.  However, given the pain she was having prior to her fracture, the recovery from the fracture and the expectation that she'd likely have ongoing pain even after her fracture healed due to the preexisting arthritis, she elected to proceed with a right total knee replacement. After discussing risks, benefits and surgery, she elected to proceed with a right total knee replacement understanding the risks of infection, damage to vessels and nerves, blood clots, stiffness, ongoing pain, need for revision surgery, need for transfusion.     PROCEDURE: The patient was brought to the preoperative holding area where the right knee was marked. Consent was reviewed. She then was transferred to the operating theater. After induction of successful spinal anesthesia, she was placed supine with a bump under the right hip.  A timeout was performed, verifying correct patient,  surgery, location. She received preoperative antibiotics as well as transexemic acid. Her right lower extremity was then prepped and draped in standard sterile fashion.     We exsanguinated the leg and inflated the tourniquet. We then made a longitudinal incision over the anterior aspect of the knee. Dissection was carried down through skin and subcutaneous tissue. A medial parapatellar arthrotomy was made, exposing a large hemarthrosis and lateral compartment arthritis.  Synovial tissue from the suprapatellar pouch excised. The anterior horn of the medial meniscus was released and no medial release was performed. Then, the remainder of the fat pad was excised. We turned our attention to the patella. Using a free hand technique, this was resected down to 13 mm and sized to a 35mm, then punched and a metal protector plate was placed. We then turned our attention to the femur. Preoperatively, there was a 1 degree flexion contracture.  Therefore, using an intramedullary alignment guide, 10 mm of distal femur was resected in 5 degrees of valgus.     We then turned our attention to the tibia.  An extramedullary alignment cut was used to resect 3mm off the low medial side.  We then turned our attention back to the distal femur and sized it to a size 6.  The epicondylar axis was established and we placed our 4-in-1 cutting block perpendicular to it, in 3 degrees of external rotation. With this in place, our anterior, posterior and chamfer distal femur cuts were made.  We then used a laminar  to open the joint in order to remove medial and lateral meniscus remnants as well as posterior osteophytes.  The anteriomedial and anterolateral capsule were injected with a pain cocktail.  The jig was utilized to cut the distal femoral box for the PS component.  A variety of polyethylene were trialed, and we felt a 6mm was best, with range of motion from full extension to 125 of flexion, stability to varus and valgus stress,  normal POLO test, and the patella tracked well.  After this, we drilled and punched our tibial component, lining it with the medial 1/3 of the tibial tubercle. The knee was thoroughly irrigated using pulse lavage. The final components were then cemented in place. All excess cement was removed and the knee was placed into full extension while the cement was curing. Also, the wound was instilled with dilute Betadine solution. Once the cement had cured, we retrialed our components with a 6mm poly with findings as above. We then placed the final poly.  The tourniquet was deflated with hemostasis achieved.  The capsular layer was closed with #1 Stratafix, at which point there was 125 degrees of flexion with gravity.  Subcutaneous tissues with 2-0 Vicryl, skin with a 3-0 Monocryl. A sterile dressing was applied and the patient was awoken from anesthesia and transferred to PACU in stable condition.     POSTOPERATIVE PLAN:   1. Weightbearing as tolerated right lower extremity with PT for mobilization.   2. Deep venous thrombosis prophylaxis: Aspirin 325mg daily x 6 weeks.   3. Perioperative antibiotics.   4. Follow up in 2 weeks for wound check.     PRATIK MINAYA MD

## 2017-06-02 NOTE — ANESTHESIA CARE TRANSFER NOTE
Patient: Anna Dahl    Procedure(s):  Right total knee arthroplasty - Wound Class: I-Clean    Diagnosis: Right knee arthritis  Diagnosis Additional Information: No value filed.    Anesthesia Type:   Spinal     Note:  Airway :Nasal Cannula  Patient transferred to:PACU        Vitals: (Last set prior to Anesthesia Care Transfer)    CRNA VITALS  6/1/2017 1859 - 6/1/2017 1929      6/1/2017             Pulse: 76    SpO2: 98 %                Electronically Signed By: Silver Browning CRNA, APRN CRNA  June 1, 2017  7:29 PM

## 2017-06-02 NOTE — PLAN OF CARE
"Problem: Knee Replacement, Total (Adult)  Goal: Signs and Symptoms of Listed Potential Problems Will be Absent or Manageable (Knee Replacement, Total)  Signs and symptoms of listed potential problems will be absent or manageable by discharge/transition of care (reference Knee Replacement, Total (Adult) CPG).   Outcome: No Change  Patient reports \"I'm tolerating it; but it's still pretty high\" when asked about pain.  Medicated with oxycodone, tylenol, vistaril, toradol and flexeril as ordered.  Patient up in chair for lunch after working with PT.  \"It was intolerable when I was up walking on it.\"      "

## 2017-06-02 NOTE — PLAN OF CARE
Problem: Goal Outcome Summary  Goal: Goal Outcome Summary  Outcome: Improving  Patient with increased pain at beginning of shift. Received IV Dilaudid which had helped relieve some of her pain in her right knee area. Later received Atarax, Tylenol and Oxycodone 10 mg. Patient on capnography. Oxygen weaned from 2 L to 1 L with saturation 92%. Using IS. PCD on. Dressing d and i. Good cms to lower extremities. Patient incontinent of large amount of urine. Patient is not usually incontinent. IVF at TKO. Drank 8 oz of apple juice. No nausea. Changed to regular diet for breakfast. Patient was able to dangle at bedside and stand. CPM on at 40 degree flexion. Tolerated well.

## 2017-06-02 NOTE — OR NURSING
"Attempted to call report.   currently at bedside, pt able to keep conversation with him.  Pt states pain medication is working \"it's less stinging\".  "

## 2017-06-02 NOTE — OR NURSING
Pt arrived to PACU bed in ICU.  Pt awakens, is appropriate.  Sleeps easily. No complaints of discomfort.

## 2017-06-02 NOTE — PROGRESS NOTES
[facility] Medicine Progress Note  Date of Service: 06/02/2017    Assessment & Plan   Anna Dahl is a 66 year old female who presented on 6/1/2017 for scheduled Procedure(s):  ARTHROPLASTY KNEE by Colin Krause MD and is being followed by the hospital medicine service for co-management of acute and/or chronic perioperative medical problems.    S/p Procedure(s):  ARTHROPLASTY KNEE  - POD #1  - pain control, wound cares, physical therapy, occupational therapy and DVT prophylaxis per orthopedic surgery service    Elevated glucose  Fasting glucose 144.  A1c 5.4%.    CAD (coronary artery disease)  - continue PTA lisinopril, metoprolol    Anxiety  - continue PTA Zoloft, ativan    GERD (gastroesophageal reflux disease)  -continue PTA Carafate, Prilosec      Hyperlipidemia LDL goal <100  - continue PTA atorvastatin     Mild intermittent asthma with exacerbation  - continue PTA Flonase  - continue PTA Singulair  - continue PTA Flovent      DVT Prophylaxis: as per orthopedic surgery service - Defer to primary service  Code Status: Full Code    Lines: PIV, without edema/erythema   Ramirez catheter: not indicated    Discussion: Medically, the patient appears stable.    Disposition: Anticipate discharge tomorrow or the following day. Patient plans to discharge to home with outpatient PT.  However, this may need to be re-addressed given pain issues..     Attestation:  I have reviewed today's vital signs, notes, medications, labs and imaging.    Catina Tirado PA-C      Interval History   Pain is not well controlled.  Patient reports she is going to undergo a second block due to pain control issues this afternoon.  Per review of the chart, anesthesia was consulted to reattempt block to right adductor canal.  The patient otherwise denies fever, CP, SOB, new cough, abdominal pain, dysuria, and lower extremity swelling.  The patient is NOT yet passing flatus nor has she had a BM.   She is voiding without difficulty.  She reports both PT and OT were quite challenging today as her pain has been quite severe.    Physical Exam   Temp:  [96.1  F (35.6  C)-98.3  F (36.8  C)] 97.2  F (36.2  C)  Pulse:  [46-71] 56  Resp:  [16-18] 16  BP: (102-147)/(52-85) 119/63  SpO2:  [92 %-97 %] 94 %    Weights:   Vitals:    06/01/17 1347 06/01/17 2122   Weight: 88.5 kg (195 lb) 96.5 kg (212 lb 11.9 oz)    Body mass index is 40.53 kg/(m^2).    General: alert and oriented x4, in no acute distress  CV: Regular rate/rhythm, no appreciable murmur, rub, or gallop  Respiratory: CTA bilaterally, equal chest expansion  GI: Soft, nontender, hypoactive but present BS  Skin: Warm and dry  Musculoskeletal: Negative homans bilaterally.  Tender to palpation of right calf while palpating posteriorly, however patient reports that the pain felt is in her knee.  No pain to palpation of left calf. no edema to lower extremities.  Neuro: equal  strength    Data     Recent Labs  Lab 06/02/17  0625   HGB 10.8*      POTASSIUM 4.4   CHLORIDE 104   CO2 24   BUN 16   CR 0.86   ANIONGAP 9   LONNIE 8.5   *         Recent Labs  Lab 06/02/17  0625   *        Unresulted Labs Ordered in the Past 30 Days of this Admission     No orders found for last 61 day(s).           Imaging  Recent Results (from the past 24 hour(s))   XR Knee Port Right 1/2 Views    Narrative    PORTABLE RIGHT KNEE ONE-TWO VIEWS  6/1/2017 8:09 PM     HISTORY: Postop total knee.    COMPARISON: Exam performed on May 15, 2017.      Impression    IMPRESSION: 66-year-old patient with right total knee arthroplasty.  Knee arthroplasty components are normally aligned. No periprosthetic  fracture. Knee effusion and air within the effusion noted, as expected  postoperatively. Alignment intact.    JOLYNN ZULETA MD        I reviewed all new labs and imaging results over the last 24 hours. I personally reviewed no images or EKG's today.    Medications         ketorolac  15 mg Intravenous Q6H     atorvastatin  40 mg Oral Daily     fluticasone  2 spray Both Nostrils Daily     lisinopril  5 mg Oral Daily     metoprolol  25 mg Oral Daily     montelukast  10 mg Oral At Bedtime     omeprazole  40 mg Oral Q12H     sertraline  25 mg Oral Daily     sucralfate  1 g Oral 4x Daily     sodium chloride (PF)  3 mL Intracatheter Q8H     acetaminophen  975 mg Oral Q8H     senna-docusate  1-2 tablet Oral BID     aspirin EC  325 mg Oral Daily     fluticasone furoate  1 puff Inhalation Daily       Catina Tirado PA-C

## 2017-06-02 NOTE — PLAN OF CARE
"[facility] ADMISSION NOTE    Patient admitted to room 2310 at approximately 2105 via cart from surgery. Patient was accompanied by spouse and nurse.     Verbal SBAR report received from Ilda BARTLETT prior to patient arrival.     Patient trasferred to bed via air piter. Patient alert and oriented X 3. The patient is not having any pain. 0-10 Pain Scale: 5. Admission vital signs: Blood pressure 141/78, pulse 54, temperature 96.9  F (36.1  C), temperature source Oral, resp. rate 16, height 1.543 m (5' 0.75\"), weight 96.5 kg (212 lb 11.9 oz), SpO2 96 %. Patient was oriented to plan of care, call light, bed controls, tv, telephone, bathroom and visiting hours.     The following safety risks were identified during admission: fall. Yellow risk band applied: YES.     Mary Schirmers    "

## 2017-06-03 ENCOUNTER — APPOINTMENT (OUTPATIENT)
Dept: OCCUPATIONAL THERAPY | Facility: CLINIC | Age: 67
DRG: 470 | End: 2017-06-03
Attending: ORTHOPAEDIC SURGERY
Payer: MEDICARE

## 2017-06-03 ENCOUNTER — APPOINTMENT (OUTPATIENT)
Dept: PHYSICAL THERAPY | Facility: CLINIC | Age: 67
DRG: 470 | End: 2017-06-03
Attending: ORTHOPAEDIC SURGERY
Payer: MEDICARE

## 2017-06-03 PROBLEM — N17.9 ACUTE KIDNEY FAILURE (H): Status: ACTIVE | Noted: 2017-06-03

## 2017-06-03 PROBLEM — D64.9 POSTOPERATIVE ANEMIA: Status: ACTIVE | Noted: 2017-06-03

## 2017-06-03 LAB
ANION GAP SERPL CALCULATED.3IONS-SCNC: 9 MMOL/L (ref 3–14)
BUN SERPL-MCNC: 24 MG/DL (ref 7–30)
CALCIUM SERPL-MCNC: 8.3 MG/DL (ref 8.5–10.1)
CHLORIDE SERPL-SCNC: 99 MMOL/L (ref 94–109)
CO2 SERPL-SCNC: 25 MMOL/L (ref 20–32)
CREAT SERPL-MCNC: 1.29 MG/DL (ref 0.52–1.04)
GFR SERPL CREATININE-BSD FRML MDRD: 41 ML/MIN/1.7M2
GLUCOSE SERPL-MCNC: 116 MG/DL (ref 70–99)
GLUCOSE SERPL-MCNC: 119 MG/DL (ref 70–99)
HGB BLD-MCNC: 10.1 G/DL (ref 11.7–15.7)
HGB BLD-MCNC: 9.5 G/DL (ref 11.7–15.7)
POTASSIUM SERPL-SCNC: 3.9 MMOL/L (ref 3.4–5.3)
SODIUM SERPL-SCNC: 133 MMOL/L (ref 133–144)

## 2017-06-03 PROCEDURE — 82947 ASSAY GLUCOSE BLOOD QUANT: CPT | Performed by: ORTHOPAEDIC SURGERY

## 2017-06-03 PROCEDURE — 97165 OT EVAL LOW COMPLEX 30 MIN: CPT | Mod: GO

## 2017-06-03 PROCEDURE — A9270 NON-COVERED ITEM OR SERVICE: HCPCS | Mod: GY | Performed by: ORTHOPAEDIC SURGERY

## 2017-06-03 PROCEDURE — 12000007 ZZH R&B INTERMEDIATE

## 2017-06-03 PROCEDURE — 25000128 H RX IP 250 OP 636: Performed by: FAMILY MEDICINE

## 2017-06-03 PROCEDURE — A9270 NON-COVERED ITEM OR SERVICE: HCPCS | Mod: GY | Performed by: FAMILY MEDICINE

## 2017-06-03 PROCEDURE — 25000128 H RX IP 250 OP 636: Performed by: PHYSICIAN ASSISTANT

## 2017-06-03 PROCEDURE — 99207 ZZC CDG-CHARGE REQUIRED MANUAL ENTRY: CPT | Performed by: FAMILY MEDICINE

## 2017-06-03 PROCEDURE — 97110 THERAPEUTIC EXERCISES: CPT | Mod: GP

## 2017-06-03 PROCEDURE — 80048 BASIC METABOLIC PNL TOTAL CA: CPT | Performed by: FAMILY MEDICINE

## 2017-06-03 PROCEDURE — 25000132 ZZH RX MED GY IP 250 OP 250 PS 637: Mod: GY | Performed by: ORTHOPAEDIC SURGERY

## 2017-06-03 PROCEDURE — 99232 SBSQ HOSP IP/OBS MODERATE 35: CPT | Performed by: FAMILY MEDICINE

## 2017-06-03 PROCEDURE — 25000132 ZZH RX MED GY IP 250 OP 250 PS 637: Mod: GY | Performed by: PHYSICIAN ASSISTANT

## 2017-06-03 PROCEDURE — 97116 GAIT TRAINING THERAPY: CPT | Mod: GP

## 2017-06-03 PROCEDURE — A9270 NON-COVERED ITEM OR SERVICE: HCPCS | Mod: GY | Performed by: PHYSICIAN ASSISTANT

## 2017-06-03 PROCEDURE — 25000125 ZZHC RX 250: Performed by: ORTHOPAEDIC SURGERY

## 2017-06-03 PROCEDURE — 97535 SELF CARE MNGMENT TRAINING: CPT | Mod: GO

## 2017-06-03 PROCEDURE — 36415 COLL VENOUS BLD VENIPUNCTURE: CPT | Performed by: ORTHOPAEDIC SURGERY

## 2017-06-03 PROCEDURE — 40000193 ZZH STATISTIC PT WARD VISIT

## 2017-06-03 PROCEDURE — 85018 HEMOGLOBIN: CPT | Performed by: ORTHOPAEDIC SURGERY

## 2017-06-03 PROCEDURE — 25000132 ZZH RX MED GY IP 250 OP 250 PS 637: Mod: GY | Performed by: FAMILY MEDICINE

## 2017-06-03 RX ORDER — SODIUM CHLORIDE 9 MG/ML
INJECTION, SOLUTION INTRAVENOUS CONTINUOUS
Status: DISCONTINUED | OUTPATIENT
Start: 2017-06-03 | End: 2017-06-04 | Stop reason: HOSPADM

## 2017-06-03 RX ORDER — FUROSEMIDE 20 MG
20 TABLET ORAL ONCE
Status: COMPLETED | OUTPATIENT
Start: 2017-06-03 | End: 2017-06-03

## 2017-06-03 RX ADMIN — CYCLOBENZAPRINE 5 MG: 5 TABLET, FILM COATED ORAL at 16:13

## 2017-06-03 RX ADMIN — SODIUM CHLORIDE: 9 INJECTION, SOLUTION INTRAVENOUS at 21:19

## 2017-06-03 RX ADMIN — OXYCODONE HYDROCHLORIDE 10 MG: 5 TABLET ORAL at 17:46

## 2017-06-03 RX ADMIN — OXYCODONE HYDROCHLORIDE 10 MG: 5 TABLET ORAL at 20:57

## 2017-06-03 RX ADMIN — FUROSEMIDE 20 MG: 20 TABLET ORAL at 17:46

## 2017-06-03 RX ADMIN — SODIUM CHLORIDE 500 ML: 9 INJECTION, SOLUTION INTRAVENOUS at 15:46

## 2017-06-03 RX ADMIN — OXYCODONE HYDROCHLORIDE 10 MG: 5 TABLET ORAL at 06:31

## 2017-06-03 RX ADMIN — OMEPRAZOLE 40 MG: 20 CAPSULE, DELAYED RELEASE ORAL at 20:58

## 2017-06-03 RX ADMIN — HYDROXYZINE HYDROCHLORIDE 50 MG: 25 TABLET ORAL at 12:08

## 2017-06-03 RX ADMIN — KETOROLAC TROMETHAMINE 15 MG: 15 INJECTION, SOLUTION INTRAMUSCULAR; INTRAVENOUS at 05:30

## 2017-06-03 RX ADMIN — CYCLOBENZAPRINE 5 MG: 5 TABLET, FILM COATED ORAL at 23:36

## 2017-06-03 RX ADMIN — OXYCODONE HYDROCHLORIDE 10 MG: 5 TABLET ORAL at 00:48

## 2017-06-03 RX ADMIN — CYCLOBENZAPRINE 5 MG: 5 TABLET, FILM COATED ORAL at 10:25

## 2017-06-03 RX ADMIN — OXYCODONE HYDROCHLORIDE 10 MG: 5 TABLET ORAL at 03:34

## 2017-06-03 RX ADMIN — ACETAMINOPHEN 975 MG: 325 TABLET, FILM COATED ORAL at 13:49

## 2017-06-03 RX ADMIN — OMEPRAZOLE 40 MG: 20 CAPSULE, DELAYED RELEASE ORAL at 09:58

## 2017-06-03 RX ADMIN — SENNOSIDES AND DOCUSATE SODIUM 2 TABLET: 8.6; 5 TABLET ORAL at 20:58

## 2017-06-03 RX ADMIN — FLUTICASONE PROPIONATE 2 SPRAY: 50 SPRAY, METERED NASAL at 10:07

## 2017-06-03 RX ADMIN — ONDANSETRON 4 MG: 4 TABLET, ORALLY DISINTEGRATING ORAL at 01:07

## 2017-06-03 RX ADMIN — KETOROLAC TROMETHAMINE 15 MG: 15 INJECTION, SOLUTION INTRAMUSCULAR; INTRAVENOUS at 00:48

## 2017-06-03 RX ADMIN — OXYCODONE HYDROCHLORIDE 10 MG: 5 TABLET ORAL at 10:00

## 2017-06-03 RX ADMIN — OXYCODONE HYDROCHLORIDE 10 MG: 5 TABLET ORAL at 13:49

## 2017-06-03 RX ADMIN — SENNOSIDES AND DOCUSATE SODIUM 2 TABLET: 8.6; 5 TABLET ORAL at 09:59

## 2017-06-03 RX ADMIN — SUCRALFATE 1 G: 1 TABLET ORAL at 22:09

## 2017-06-03 RX ADMIN — LISINOPRIL 5 MG: 5 TABLET ORAL at 09:59

## 2017-06-03 RX ADMIN — FLUTICASONE FUROATE 1 PUFF: 100 POWDER RESPIRATORY (INHALATION) at 10:05

## 2017-06-03 RX ADMIN — SERTRALINE HYDROCHLORIDE 25 MG: 25 TABLET, FILM COATED ORAL at 10:04

## 2017-06-03 RX ADMIN — ACETAMINOPHEN 975 MG: 325 TABLET, FILM COATED ORAL at 05:29

## 2017-06-03 RX ADMIN — HYDROXYZINE HYDROCHLORIDE 25 MG: 25 TABLET ORAL at 00:48

## 2017-06-03 RX ADMIN — MONTELUKAST 10 MG: 10 TABLET, FILM COATED ORAL at 20:57

## 2017-06-03 RX ADMIN — METOPROLOL SUCCINATE 25 MG: 25 TABLET, EXTENDED RELEASE ORAL at 09:59

## 2017-06-03 RX ADMIN — ASPIRIN 325 MG: 325 TABLET, COATED ORAL at 09:59

## 2017-06-03 RX ADMIN — ACETAMINOPHEN 975 MG: 325 TABLET, FILM COATED ORAL at 22:09

## 2017-06-03 RX ADMIN — SUCRALFATE 1 G: 1 TABLET ORAL at 16:14

## 2017-06-03 RX ADMIN — ATORVASTATIN CALCIUM 40 MG: 20 TABLET, FILM COATED ORAL at 09:58

## 2017-06-03 RX ADMIN — SUCRALFATE 1 G: 1 TABLET ORAL at 12:02

## 2017-06-03 ASSESSMENT — ACTIVITIES OF DAILY LIVING (ADL): PREVIOUS_RESPONSIBILITIES: MEAL PREP;HOUSEKEEPING;LAUNDRY;SHOPPING;MEDICATION MANAGEMENT

## 2017-06-03 NOTE — PLAN OF CARE
"Problem: Knee Replacement, Total (Adult)  Goal: Signs and Symptoms of Listed Potential Problems Will be Absent or Manageable (Knee Replacement, Total)  Signs and symptoms of listed potential problems will be absent or manageable by discharge/transition of care (reference Knee Replacement, Total (Adult) CPG).   Patients pain is \"better managed\" today.  Medicating with scheduled tylenol and flexeril; along with PRN oxycodone (every 3 hours) and vistaril.  IV was out earlier today due to leaking.  Did NOT given 1145 dose of toradol.  Seems to be okay without toradol at this time.  Did place new 22g IV in left AC.     Taking senokot for bowels.  Passing gas; last BM 6/1/17 morning of surgery at home.  Given prune juice, apple juice and is taking in good amounts of water.     Only voided 25 cc dark, yellow urine today.  Bladder scanned 22 cc.  Updated Dr. Lares.  Basic metabolic panel was drawn.  Cr 1.29 and GFR 41.  Awaiting MD call back.      "

## 2017-06-03 NOTE — PROGRESS NOTES
06/03/17 1050   Quick Adds   Type of Visit Initial Occupational Therapy Evaluation   Living Environment   Lives With spouse   Living Arrangements house   Home Accessibility stairs to enter home   Number of Stairs to Enter Home 3   Number of Stairs Within Home 12   Stair Railings at Home inside, present at both sides   Living Environment Comment Laundry room down stairs which    Self-Care   Dominant Hand right   Usual Activity Tolerance good   Current Activity Tolerance moderate   Regular Exercise no   Functional Level Prior   Ambulation 0-->independent   Transferring 0-->independent   Toileting 0-->independent   Bathing 0-->independent   Dressing 0-->independent   Eating 0-->independent   Communication 0-->understands/communicates without difficulty   Swallowing 0-->swallows foods/liquids without difficulty   Cognition 0 - no cognition issues reported   Fall history within last six months yes   Number of times patient has fallen within last six months 1   Which of the above functional risks had a recent onset or change? ambulation   General Information   Onset of Illness/Injury or Date of Surgery - Date 06/01/17   Patient/Family Goals Statement to return home with assist to be as ind as possible   Weight-Bearing Status - RLE weight-bearing as tolerated   Cognitive Status Examination   Orientation orientation to person, place and time   Hand Strength   Hand Strength Comments wfl   Coordination   Upper Extremity Coordination No deficits were identified   Transfer Skill: Sit to Stand   Level of [place]: Sit/Stand stand-by assist   Physical Assist/Nonphysical Assist: Sit/Stand supervision   Transfer Skill: Sit to Stand weight-bearing as tolerated   Assistive Device for Transfer: Sit/Stand rolling walker   Transfer Skill: Toilet Transfer   Level of [place]: Toilet stand-by assist   Physical Assist/Nonphysical Assist: Toilet supervision   Weight-Bearing Restrictions: Toilet weight-bearing as tolerated  "  Assistive Device rolling walker   Upper Body Dressing   Level of [place]: Dress Upper Body independent   Lower Body Dressing   Level of [place]: Dress Lower Body minimum assist (75% patients effort)   Physical Assist/Nonphysical Assist: Dress Lower Body verbal cues   Assistive Device reacher   Toileting   Level of [place]: Toilet stand-by assist   Grooming   Level of [place]: Grooming stand-by assist   Eating/Self Feeding   Level of [place]: Eating independent   Instrumental Activities of Daily Living (IADL)   Previous Responsibilities meal prep;housekeeping;laundry;shopping;medication management   Activities of Daily Living Analysis   Impairments Contributing to Impaired Activities of Daily Living flexibility decreased;post surgical precautions   General Therapy Interventions   Planned Therapy Interventions ADL retraining;strengthening;transfer training   Clinical Impression   Criteria for Skilled Therapeutic Interventions Met yes, treatment indicated   OT Diagnosis R TKA   Influenced by the following impairments functional mobility and transfers   Assessment of Occupational Performance 1-3 Performance Deficits   Identified Performance Deficits LB dressing, functional amb for ADLs and transfers   Clinical Decision Making (Complexity) Low complexity   Therapy Frequency daily   Predicted Duration of Therapy Intervention (days/wks) 2   Anticipated Discharge Disposition Home with Assist   Risks and Benefits of Treatment have been explained. Yes   Patient, Family & other staff in agreement with plan of care Yes   Clinical Impression Comments Pt appears to be a good OT canidate with decreased c/o pain for increased ADL and transfer ind   [facility] AM-PAC TM \"6 Clicks\"   2016, Trustees of [facility], under license to [employer].  All rights reserved.   6 Clicks Short Forms Daily Activity Inpatient Short Form   [facility] AM-PAC  \"6 Clicks\" Daily Activity Inpatient Short " Form   1. Putting on and taking off regular lower body clothing? 3 - A Little   2. Bathing (including washing, rinsing, drying)? 3 - A Little   3. Toileting, which includes using toilet, bedpan or urinal? 3 - A Little   4. Putting on and taking off regular upper body clothing? 4 - None   5. Taking care of personal grooming such as brushing teeth? 3 - A Little   6. Eating meals? 4 - None   Daily Activity Raw Score (Score out of 24.Lower scores equate to lower levels of function) 20   Total Evaluation Time   Total Evaluation Time (Minutes) 20   Dari Singh OTR/L

## 2017-06-03 NOTE — PLAN OF CARE
"Problem: Knee Replacement, Total (Adult)  Goal: Signs and Symptoms of Listed Potential Problems Will be Absent or Manageable (Knee Replacement, Total)  Signs and symptoms of listed potential problems will be absent or manageable by discharge/transition of care (reference Knee Replacement, Total (Adult) CPG).   Outcome: Improving  Patient reports \"pain is much better\" after receiving pain block.  \"Comfortably managed.\"       "

## 2017-06-03 NOTE — PLAN OF CARE
Problem: Goal Outcome Summary  Goal: Goal Outcome Summary  Outcome: Improving  Patient received 500 ml bolus over 2 hours. She had 20 mg oral lasix when infusion of bolus was complete. She has voided 275 ml since shift start at 1500. Will continue to monitor output.

## 2017-06-03 NOTE — PLAN OF CARE
Problem: Goal Outcome Summary  Goal: Goal Outcome Summary  Outcome: Therapy, progress towards functional goals is fair  Pt reports feeling better overall today. Feels she is a day behind in PT due to significant pain and inability to perform yesterday.   Transfers supine<>sit, SBA to Min A due to fatigue  Sit<>stand, SBA to RW, amb >25' today, minimal WB through R LE.   ROM improving, +10-60.

## 2017-06-03 NOTE — PROGRESS NOTES
[facility] Medicine Progress Note  Date of Service: 06/03/2017    Assessment & Plan   Anna Dahl is a 66 year old female who presented on 6/1/2017 for scheduled Procedure(s):  ARTHROPLASTY KNEE by Colin Krause MD and is being followed by the hospital medicine service for co-management of acute and/or chronic perioperative medical problems.    S/p Procedure(s):  ARTHROPLASTY KNEE  - POD #1  - Pain control, wound cares, physical therapy, occupational therapy and DVT prophylaxis per orthopedic surgery service    BHAVIK:  Likely prerenal azotemia especially with low blood pressure and low hemoglobin. Normal saline bolus X1L with lasix challenge in between the doses.  Hold Lisinopril    Postop Anemia:  Hgb dropped to 9.5 from 10.8 yesterday. Will check q8 hourly. If this continues to drop especially after transfusion, may need to be transfused especially with her history of CAD.    Elevated glucose  Fasting glucose 144.  A1c 5.4%.    CAD (coronary artery disease)  - continue PTA lisinopril, metoprolol  6/3/2017: will at this point hold medications as BP is low and she has BHAVIK    Anxiety  - continue PTA Zoloft, ativan    GERD (gastroesophageal reflux disease)  -continue PTA Carafate, Prilosec      Hyperlipidemia LDL goal <100  - continue PTA atorvastatin     Mild intermittent asthma with exacerbation  - continue PTA Flonase  - continue PTA Singulair  - continue PTA Flovent      DVT Prophylaxis: as per orthopedic surgery service - Defer to primary service  Code Status: Full Code    Lines: PIV, without edema/erythema   Ramirez catheter: not indicated    Discussion: Medically, the patient appears stable.    Disposition: Anticipate discharge tomorrow or the following day. Patient plans to discharge to home with outpatient PT.  However, this may need to be re-addressed given pain issues..     Attestation:  I have reviewed today's vital signs, notes, medications, labs and  imaging.    Luz Lares       Interval History   Pain is better controlled today. Patient however noted to be making very minimal urine- no urinary retention. The patient otherwise denies fever, CP, SOB, new cough, abdominal pain, dysuria, and lower extremity swelling, no dizziness or light headedness. Yet to have a bowel movement.      Vitals:    06/02/17 2038 06/03/17 0051 06/03/17 0839 06/03/17 1529   BP: 93/56 101/46 93/55 101/52   BP Location: Right arm Right arm  Right arm   Pulse: 61 66 62 70   Resp: 18 18 16 16   Temp: 97.7  F (36.5  C) 98  F (36.7  C) 97.5  F (36.4  C) 98.8  F (37.1  C)   TempSrc: Oral Oral Oral Oral   SpO2: 93% 93% 91%    Weight:       Height:           General: alert and oriented x4, in no acute distress  CV: Regular rate/rhythm, no appreciable murmur, rub, or gallop  Respiratory: CTA bilaterally, equal chest expansion  GI: Soft, nontender, hypoactive but present BS  Skin: Warm and dry  Musculoskeletal: Negative homans bilaterally.  Tender to palpation of right calf while palpating posteriorly, however patient reports that the pain felt is in her knee.  No pain to palpation of left calf. no edema to lower extremities.  Neuro: equal  strength    Results for orders placed or performed during the hospital encounter of 06/01/17   XR Knee Port Right 1/2 Views    Narrative    PORTABLE RIGHT KNEE ONE-TWO VIEWS  6/1/2017 8:09 PM     HISTORY: Postop total knee.    COMPARISON: Exam performed on May 15, 2017.      Impression    IMPRESSION: 66-year-old patient with right total knee arthroplasty.  Knee arthroplasty components are normally aligned. No periprosthetic  fracture. Knee effusion and air within the effusion noted, as expected  postoperatively. Alignment intact.    JOLYNN ZULETA MD   Basic metabolic panel   Result Value Ref Range    Sodium 137 133 - 144 mmol/L    Potassium 4.4 3.4 - 5.3 mmol/L    Chloride 104 94 - 109 mmol/L    Carbon Dioxide 24 20 - 32 mmol/L    Anion Gap  9 3 - 14 mmol/L    Glucose 144 (H) 70 - 99 mg/dL    Urea Nitrogen 16 7 - 30 mg/dL    Creatinine 0.86 0.52 - 1.04 mg/dL    GFR Estimate 66 >60 mL/min/1.7m2    GFR Estimate If Black 80 >60 mL/min/1.7m2    Calcium 8.5 8.5 - 10.1 mg/dL   Hemoglobin   Result Value Ref Range    Hemoglobin 10.8 (L) 11.7 - 15.7 g/dL   Hemoglobin A1c   Result Value Ref Range    Hemoglobin A1C 5.6 4.3 - 6.0 %   Hemoglobin   Result Value Ref Range    Hemoglobin 9.5 (L) 11.7 - 15.7 g/dL   Glucose   Result Value Ref Range    Glucose 119 (H) 70 - 99 mg/dL   Basic metabolic panel   Result Value Ref Range    Sodium 133 133 - 144 mmol/L    Potassium 3.9 3.4 - 5.3 mmol/L    Chloride 99 94 - 109 mmol/L    Carbon Dioxide 25 20 - 32 mmol/L    Anion Gap 9 3 - 14 mmol/L    Glucose 116 (H) 70 - 99 mg/dL    Urea Nitrogen 24 7 - 30 mg/dL    Creatinine 1.29 (H) 0.52 - 1.04 mg/dL    GFR Estimate 41 (L) >60 mL/min/1.7m2    GFR Estimate If Black 50 (L) >60 mL/min/1.7m2    Calcium 8.3 (L) 8.5 - 10.1 mg/dL   Care Transition RN/SW IP Consult    Narrative    Ghada Sapp RN     6/2/2017  5:10 PM  Care Transition Initial Assessment - RN  Reason For Consult: discharge planning   Met with: Patient and spouse.    DATA   Principal Problem:    S/P total knee arthroplasty  Active Problems:    GERD (gastroesophageal reflux disease)    Hyperlipidemia LDL goal <100    Mild intermittent asthma with exacerbation    CAD (coronary artery disease)    Anxiety    Elevated glucose       Primary Care Clinic Name: [facility]  Primary Care MD Name: Dr. Ramirez  Contact information and PCP information verified: Yes      ASSESSMENT  Cognitive Status: awake, alert and oriented.        Lives With: spouse  Living Arrangements: house     Description of Support System: Supportive, Involved   Who is your support system?:    Support Assessment: Adequate family and caregiver support   Insurance Concerns: No Insurance issues identified          This writer met with pt and  spouse, introduced self and role.    Discussed home care, Pt was provided with Medicare certified home   care list. Pt chooses to use [facility]   ([phone] Fax: [phone]).  A referral was made to home   care.      PLAN    Home with [facility]      Ghada Sapp RN, Care Coordinator [phone]           Imaging  No results found for this or any previous visit (from the past 24 hour(s)).     I reviewed all new labs and imaging results over the last 24 hours. I personally reviewed no images or EKG's today.    Medications        sodium chloride 0.9%  500 mL Intravenous Once     furosemide  20 mg Oral Once     cyclobenzaprine  5 mg Oral TID     atorvastatin  40 mg Oral Daily     fluticasone  2 spray Both Nostrils Daily     montelukast  10 mg Oral At Bedtime     omeprazole  40 mg Oral Q12H     sertraline  25 mg Oral Daily     sucralfate  1 g Oral 4x Daily     sodium chloride (PF)  3 mL Intracatheter Q8H     acetaminophen  975 mg Oral Q8H     senna-docusate  1-2 tablet Oral BID     aspirin EC  325 mg Oral Daily     fluticasone furoate  1 puff Inhalation Daily       Luz Lares

## 2017-06-03 NOTE — PLAN OF CARE
"Problem: Knee Replacement, Total (Adult)  Goal: Signs and Symptoms of Listed Potential Problems Will be Absent or Manageable (Knee Replacement, Total)  Signs and symptoms of listed potential problems will be absent or manageable by discharge/transition of care (reference Knee Replacement, Total (Adult) CPG).   Outcome: Improving  Pt voices pain is \"much better\". Color WNL, good movement and report full sensation to LE and to knee areas in which nurse prior reported off that she had received info that the anterior knee may be numb.   Pt has only urinate 50cc this dalia and looking back at I/O has only urinate total of 100cc since 0930. Bladder scanned for 26cc. Belly is round and somewhat firm, pt voices this is here normal size and doesn't feel bloated or extended. PT took in 240cc of juice at this time and has been drinking some water, she know that she has to increase her intake.  Dr Lares updated with no new order, but continue to push water.       "

## 2017-06-03 NOTE — PLAN OF CARE
Problem: Goal Outcome Summary  Goal: Goal Outcome Summary  OT EVAL completed. Decreased c/o pain noted today when sitting. 8/10 pain during amb with pt encouraged to bend R knee and WBing during amb and stands with slight relief of pain.Pt had pain meds previous to tx.     Recommendation: Home with assist from

## 2017-06-03 NOTE — PLAN OF CARE
Problem: Goal Outcome Summary  Goal: Goal Outcome Summary  Outcome: Improving  A/Ox4. VSS. Up with standby assist and walker.  Dressing C/D/I.  Stated adequate pain control with PRN Oxycodone and scheduled meds.  Encouraged oral intake.

## 2017-06-03 NOTE — PROGRESS NOTES
[facility] Orthopedic Progress Note         Assessment and Plan:    Assessment:   Admission date:   6/1/2017 for ARTHROPLASTY KNEE      Doing well.  Had some trouble with pain and therapy yesterday.  Better today.       Plan:   Weight bearing as tolerated  Deep venous thrombosis prophylaxis - ASA for 42 days  Physical therapy  Pain control measures  Discharge plan: Tomorrow with outpatient pt  Additional problems to be followed by medicine physician           Interval History:   Doing well.  Continues to improve.  Pain is well-controlled.  No fevers.  Doing better today.  Rough day yesterday.            Significant Problems:   (Refer to attending physician notes regarding additional medical problems and issues)          Review of Systems:   The patient denies any chest pain, shortness of breath, excessive pain, fever, chills, purulent drainage from the wound, nausea or vomiting.          Medications:     Current Facility-Administered Medications   Medication     ketorolac (TORADOL) injection 15 mg     oxyCODONE (ROXICODONE) IR tablet 5-10 mg     cyclobenzaprine (FLEXERIL) tablet 5 mg     hydrOXYzine (ATARAX) tablet 25-50 mg     albuterol (PROAIR HFA/PROVENTIL HFA/VENTOLIN HFA) Inhaler 2 puff     atorvastatin (LIPITOR) tablet 40 mg     fluticasone (FLONASE) 50 MCG/ACT spray 2 spray     lisinopril (PRINIVIL/ZESTRIL) tablet 5 mg     LORazepam (ATIVAN) tablet 0.5-1 mg     metoprolol (TOPROL-XL) 24 hr tablet 25 mg     montelukast (SINGULAIR) tablet 10 mg     nitroglycerin (NITROSTAT) sublingual tablet 0.4 mg     omeprazole (priLOSEC) CR capsule 40 mg     sertraline (ZOLOFT) tablet 25 mg     sucralfate (CARAFATE) tablet 1 g     lidocaine 1 % 1 mL     lidocaine (LMX4) kit     sodium chloride (PF) 0.9% PF flush 3 mL     sodium chloride (PF) 0.9% PF flush 3 mL     benzocaine-menthol (CEPACOL) 15-3.6 MG lozenge 1-2 lozenge     HYDROmorphone (PF) (DILAUDID) injection 0.3-0.5 mg     naloxone (NARCAN) injection  "0.1-0.4 mg     ondansetron (ZOFRAN-ODT) ODT tab 4 mg    Or     ondansetron (ZOFRAN) injection 4 mg     acetaminophen (TYLENOL) tablet 975 mg     [START ON 6/4/2017] acetaminophen (TYLENOL) tablet 650 mg     senna-docusate (SENOKOT-S;PERICOLACE) 8.6-50 MG per tablet 1-2 tablet     aspirin EC EC tablet 325 mg     fluticasone furoate (ARNUITY ELLIPTA) 100 MCG/ACT inhalation powder 1 puff     Facility-Administered Medications Ordered in Other Encounters   Medication     lidocaine (PF) (XYLOCAINE) 1 % injection     lidocaine 2%-EPINEPHrine 1:200,000 injection     ropivacaine (NAROPIN) injection             Physical Exam:   Blood pressure 93/55, pulse 62, temperature 97.5  F (36.4  C), temperature source Oral, resp. rate 16, height 5' 0.75\" (1.543 m), weight 212 lb 11.9 oz (96.5 kg), SpO2 91 %.      Right lower extremity    Incision:  dressing in place, clean and intact    Lower Extremity Motor :   intact  Lower Extremity Sensory:  intact    Pulses: intact     Homans:  Negative bilaterally  Calf tenderness:  None bilaterally    Abnormal Exam findings:  none          Data:     Hemoglobin   Date Value Ref Range Status   06/03/2017 9.5 (L) 11.7 - 15.7 g/dL Final   06/02/2017 10.8 (L) 11.7 - 15.7 g/dL Final   07/28/2013 13.1 11.7 - 15.7 g/dL Final   04/12/2013 13.7 11.7 - 15.7 g/dL Final   02/01/2013 14.1 11.7 - 15.7 g/dL Final     INR   Date Value Ref Range Status   05/31/2012 1.11 0.86 - 1.14 Final   05/12/2012 1.11 0.86 - 1.14 Final   05/12/2012 1.06 0.86 - 1.14 Final   12/30/2010 0.99 0.86 - 1.14 Final   10/22/2010 1.00 0.86 - 1.14 Final             Delio Kamara MD  "

## 2017-06-04 ENCOUNTER — APPOINTMENT (OUTPATIENT)
Dept: OCCUPATIONAL THERAPY | Facility: CLINIC | Age: 67
DRG: 470 | End: 2017-06-04
Attending: ORTHOPAEDIC SURGERY
Payer: MEDICARE

## 2017-06-04 ENCOUNTER — APPOINTMENT (OUTPATIENT)
Dept: PHYSICAL THERAPY | Facility: CLINIC | Age: 67
DRG: 470 | End: 2017-06-04
Attending: ORTHOPAEDIC SURGERY
Payer: MEDICARE

## 2017-06-04 VITALS
TEMPERATURE: 98.9 F | HEART RATE: 74 BPM | SYSTOLIC BLOOD PRESSURE: 103 MMHG | WEIGHT: 212.74 LBS | HEIGHT: 61 IN | DIASTOLIC BLOOD PRESSURE: 86 MMHG | BODY MASS INDEX: 40.17 KG/M2 | RESPIRATION RATE: 18 BRPM | OXYGEN SATURATION: 91 %

## 2017-06-04 LAB
ANION GAP SERPL CALCULATED.3IONS-SCNC: 8 MMOL/L (ref 3–14)
BASOPHILS # BLD AUTO: 0 10E9/L (ref 0–0.2)
BASOPHILS NFR BLD AUTO: 0.3 %
BUN SERPL-MCNC: 16 MG/DL (ref 7–30)
CALCIUM SERPL-MCNC: 8 MG/DL (ref 8.5–10.1)
CHLORIDE SERPL-SCNC: 102 MMOL/L (ref 94–109)
CO2 SERPL-SCNC: 25 MMOL/L (ref 20–32)
CREAT SERPL-MCNC: 0.93 MG/DL (ref 0.52–1.04)
DIFFERENTIAL METHOD BLD: ABNORMAL
EOSINOPHIL # BLD AUTO: 0.4 10E9/L (ref 0–0.7)
EOSINOPHIL NFR BLD AUTO: 5.6 %
ERYTHROCYTE [DISTWIDTH] IN BLOOD BY AUTOMATED COUNT: 12.8 % (ref 10–15)
GFR SERPL CREATININE-BSD FRML MDRD: 60 ML/MIN/1.7M2
GLUCOSE SERPL-MCNC: 109 MG/DL (ref 70–99)
HCT VFR BLD AUTO: 27.9 % (ref 35–47)
HGB BLD-MCNC: 8.9 G/DL (ref 11.7–15.7)
HGB BLD-MCNC: NORMAL G/DL (ref 11.7–15.7)
IMM GRANULOCYTES # BLD: 0 10E9/L (ref 0–0.4)
IMM GRANULOCYTES NFR BLD: 0.5 %
LYMPHOCYTES # BLD AUTO: 1 10E9/L (ref 0.8–5.3)
LYMPHOCYTES NFR BLD AUTO: 16.4 %
MCH RBC QN AUTO: 29.4 PG (ref 26.5–33)
MCHC RBC AUTO-ENTMCNC: 31.9 G/DL (ref 31.5–36.5)
MCV RBC AUTO: 92 FL (ref 78–100)
MONOCYTES # BLD AUTO: 0.6 10E9/L (ref 0–1.3)
MONOCYTES NFR BLD AUTO: 9.6 %
NEUTROPHILS # BLD AUTO: 4.2 10E9/L (ref 1.6–8.3)
NEUTROPHILS NFR BLD AUTO: 67.6 %
PLATELET # BLD AUTO: 201 10E9/L (ref 150–450)
POTASSIUM SERPL-SCNC: 3.8 MMOL/L (ref 3.4–5.3)
RBC # BLD AUTO: 3.03 10E12/L (ref 3.8–5.2)
SODIUM SERPL-SCNC: 135 MMOL/L (ref 133–144)
WBC # BLD AUTO: 6.2 10E9/L (ref 4–11)

## 2017-06-04 PROCEDURE — 97535 SELF CARE MNGMENT TRAINING: CPT | Mod: GO

## 2017-06-04 PROCEDURE — 99232 SBSQ HOSP IP/OBS MODERATE 35: CPT | Performed by: FAMILY MEDICINE

## 2017-06-04 PROCEDURE — A9270 NON-COVERED ITEM OR SERVICE: HCPCS | Mod: GY | Performed by: PHYSICIAN ASSISTANT

## 2017-06-04 PROCEDURE — 99207 ZZC CDG-CHARGE REQUIRED MANUAL ENTRY: CPT | Performed by: FAMILY MEDICINE

## 2017-06-04 PROCEDURE — 97530 THERAPEUTIC ACTIVITIES: CPT | Mod: GP | Performed by: PHYSICAL THERAPIST

## 2017-06-04 PROCEDURE — 97110 THERAPEUTIC EXERCISES: CPT | Mod: GP | Performed by: PHYSICAL THERAPIST

## 2017-06-04 PROCEDURE — 85025 COMPLETE CBC W/AUTO DIFF WBC: CPT | Performed by: FAMILY MEDICINE

## 2017-06-04 PROCEDURE — 25000132 ZZH RX MED GY IP 250 OP 250 PS 637: Mod: GY | Performed by: PHYSICIAN ASSISTANT

## 2017-06-04 PROCEDURE — 36415 COLL VENOUS BLD VENIPUNCTURE: CPT | Performed by: FAMILY MEDICINE

## 2017-06-04 PROCEDURE — 97116 GAIT TRAINING THERAPY: CPT | Mod: GP | Performed by: PHYSICAL THERAPIST

## 2017-06-04 PROCEDURE — 25000132 ZZH RX MED GY IP 250 OP 250 PS 637: Mod: GY | Performed by: ORTHOPAEDIC SURGERY

## 2017-06-04 PROCEDURE — 25000128 H RX IP 250 OP 636: Performed by: FAMILY MEDICINE

## 2017-06-04 PROCEDURE — A9270 NON-COVERED ITEM OR SERVICE: HCPCS | Mod: GY | Performed by: ORTHOPAEDIC SURGERY

## 2017-06-04 PROCEDURE — 40000133 ZZH STATISTIC OT WARD VISIT

## 2017-06-04 PROCEDURE — 80048 BASIC METABOLIC PNL TOTAL CA: CPT | Performed by: FAMILY MEDICINE

## 2017-06-04 PROCEDURE — 40000193 ZZH STATISTIC PT WARD VISIT: Performed by: PHYSICAL THERAPIST

## 2017-06-04 RX ORDER — CYCLOBENZAPRINE HCL 5 MG
5 TABLET ORAL 3 TIMES DAILY PRN
Qty: 21 TABLET | Refills: 0 | Status: SHIPPED | OUTPATIENT
Start: 2017-06-04 | End: 2019-05-20

## 2017-06-04 RX ORDER — HYDROXYZINE HYDROCHLORIDE 25 MG/1
25-50 TABLET, FILM COATED ORAL EVERY 4 HOURS PRN
Qty: 30 TABLET | Refills: 1 | Status: SHIPPED | OUTPATIENT
Start: 2017-06-04 | End: 2019-05-20

## 2017-06-04 RX ORDER — OXYCODONE HYDROCHLORIDE 5 MG/1
5-10 TABLET ORAL
Qty: 60 TABLET | Refills: 0 | Status: SHIPPED | OUTPATIENT
Start: 2017-06-04 | End: 2019-05-20

## 2017-06-04 RX ORDER — FERROUS SULFATE 325(65) MG
325 TABLET ORAL 3 TIMES DAILY
Qty: 90 TABLET | Refills: 0 | Status: SHIPPED | OUTPATIENT
Start: 2017-06-04 | End: 2017-06-04

## 2017-06-04 RX ORDER — FERROUS SULFATE 325(65) MG
325 TABLET ORAL 3 TIMES DAILY
Status: DISCONTINUED | OUTPATIENT
Start: 2017-06-04 | End: 2017-06-04 | Stop reason: HOSPADM

## 2017-06-04 RX ADMIN — OMEPRAZOLE 40 MG: 20 CAPSULE, DELAYED RELEASE ORAL at 09:07

## 2017-06-04 RX ADMIN — SODIUM CHLORIDE: 9 INJECTION, SOLUTION INTRAVENOUS at 06:52

## 2017-06-04 RX ADMIN — FERROUS SULFATE TAB 325 MG (65 MG ELEMENTAL FE) 325 MG: 325 (65 FE) TAB at 09:08

## 2017-06-04 RX ADMIN — SENNOSIDES AND DOCUSATE SODIUM 1 TABLET: 8.6; 5 TABLET ORAL at 09:08

## 2017-06-04 RX ADMIN — ASCORBIC ACID TAB 250 MG 250 MG: 250 TAB at 09:20

## 2017-06-04 RX ADMIN — OXYCODONE HYDROCHLORIDE 10 MG: 5 TABLET ORAL at 12:38

## 2017-06-04 RX ADMIN — ATORVASTATIN CALCIUM 40 MG: 20 TABLET, FILM COATED ORAL at 09:07

## 2017-06-04 RX ADMIN — SERTRALINE HYDROCHLORIDE 25 MG: 25 TABLET, FILM COATED ORAL at 09:19

## 2017-06-04 RX ADMIN — FLUTICASONE PROPIONATE 2 SPRAY: 50 SPRAY, METERED NASAL at 10:03

## 2017-06-04 RX ADMIN — SUCRALFATE 1 G: 1 TABLET ORAL at 09:08

## 2017-06-04 RX ADMIN — FLUTICASONE FUROATE 1 PUFF: 100 POWDER RESPIRATORY (INHALATION) at 10:04

## 2017-06-04 RX ADMIN — ACETAMINOPHEN 975 MG: 325 TABLET, FILM COATED ORAL at 12:37

## 2017-06-04 RX ADMIN — ASPIRIN 325 MG: 325 TABLET, COATED ORAL at 09:08

## 2017-06-04 RX ADMIN — OXYCODONE HYDROCHLORIDE 10 MG: 5 TABLET ORAL at 02:43

## 2017-06-04 RX ADMIN — SUCRALFATE 1 G: 1 TABLET ORAL at 12:38

## 2017-06-04 RX ADMIN — OXYCODONE HYDROCHLORIDE 10 MG: 5 TABLET ORAL at 09:07

## 2017-06-04 RX ADMIN — ACETAMINOPHEN 975 MG: 325 TABLET, FILM COATED ORAL at 06:17

## 2017-06-04 RX ADMIN — CYCLOBENZAPRINE 5 MG: 5 TABLET, FILM COATED ORAL at 09:08

## 2017-06-04 RX ADMIN — OXYCODONE HYDROCHLORIDE 10 MG: 5 TABLET ORAL at 06:17

## 2017-06-04 NOTE — DISCHARGE SUMMARY
[facility] Orthopedics Discharge Summary                                  [facility]     DENISE GONZALEZ [id number]   Age: 66 year old  PCP: Nadia Ramirez, [phone] 1950     Date of Admission:  6/1/2017  Date of Discharge::  6/4/2017  Discharge Physician:  Anjelica Chowdhury PA-C    Code status:  Full Code    Admission Information:  Admission Diagnosis:  Right knee arthritis  S/P total knee arthroplasty    Post-Operative Day: 3 Days Post-Op     Reason for admission:  The patient was admitted for the following:Procedure(s) (LRB):  ARTHROPLASTY KNEE (Right)    Principal Problem:    S/P total knee arthroplasty  Active Problems:    CAD (coronary artery disease)    Anxiety    Acute kidney failure (H)    Postoperative anemia    GERD (gastroesophageal reflux disease)    Hyperlipidemia LDL goal <100    Mild intermittent asthma with exacerbation    Elevated glucose      Allergies:  Amoxicillin; Rosuvastatin; and Reglan [metoclopramide hcl]    Following the procedure noted above the patient was transferred to the post-op floor and started on:    Therapy:  physical therapy and occupational therapy  Anticoagulation Plan:  mg daily  for 42 days  Pain Management: oxycodone, tylenol, vistaril and flexeril  Weight bearing status: Weight bearing as tolerated     The patient was followed and co-managed by the hospitalist service during the inpatient treatment course  Complications:  Hypotension and ABL anemia, corrected with fluid and anemia stabilized  Consultations:  Hospitalist     Pertinent Labs   Lab Results: personally reviewed.     Recent Labs   Lab Test  06/04/17   0705  06/03/17   2150  06/03/17   0625  06/02/17   0625   07/28/13   1108  04/12/13   0115   05/31/12   0718   05/12/12   2356   05/12/12   1305   INR   --    --    --    --    --    --    --    --   1.11   --   1.11   --   1.06   HGB  8.9*  10.1*  9.5*  10.8*   --   13.1  13.7   < >  12.9   < >  13.1   --   13.5   HCT   27.9*   --    --    --    --   41.3  42.0   < >  39.2   < >  39.2   --   41.5   MCV  92   --    --    --    --   92  92   < >  89   < >  89   --   91   PLT  201   --    --    --    --   212  243   < >  207   < >  207   --   238   NA  135   --   133  137   < >  141  140   < >  141   < >   --    < >   --     < > = values in this interval not displayed.          Discharge Information:  Condition at discharge: Stable  Discharge destination:  Discharged to home with home care     Medications at discharge:  Current Discharge Medication List      START taking these medications    Details   cyclobenzaprine (FLEXERIL) 5 MG tablet Take 1 tablet (5 mg) by mouth 3 times daily as needed for muscle spasms  Qty: 21 tablet, Refills: 0    Associated Diagnoses: Status post total right knee replacement      hydrOXYzine (ATARAX) 25 MG tablet Take 1-2 tablets (25-50 mg) by mouth every 4 hours as needed for itching  Qty: 30 tablet, Refills: 1    Associated Diagnoses: Status post total right knee replacement      oxyCODONE (ROXICODONE) 5 MG IR tablet Take 1-2 tablets (5-10 mg) by mouth every 3 hours as needed for moderate to severe pain  Qty: 60 tablet, Refills: 0    Associated Diagnoses: Status post total right knee replacement      ferrous fumarate 65 mg, [place]. FE,-Vitamin C 125 mg (VITRON C)  MG TABS tablet Take 1 tablet by mouth 2 times daily  Qty: 30 tablet, Refills: 1    Associated Diagnoses: Status post total right knee replacement      senna-docusate (SENOKOT-S;PERICOLACE) 8.6-50 MG per tablet Take 1-2 tablets by mouth 2 times daily  Qty: 100 tablet, Refills: 1    Associated Diagnoses: Status post total right knee replacement      !! order for DME Equipment being ordered: Walker Wheels ()  Treatment Diagnosis: s/p TKA  Qty: 1 Units, Refills: 0    Associated Diagnoses: Status post total right knee replacement       !! - Potential duplicate medications found. Please discuss with provider.      CONTINUE these medications  which have CHANGED    Details   aspirin  MG EC tablet Take 1 tablet (325 mg) by mouth daily  Qty: 40 tablet, Refills: 0    Comments: May resume home dose of 81 mg upon completion of 325 mg Aspirin.  Associated Diagnoses: Status post total right knee replacement         CONTINUE these medications which have NOT CHANGED    Details   lisinopril (PRINIVIL,ZESTRIL) 5 MG tablet Take 1 tablet (5 mg) by mouth daily  Qty: 90 tablet, Refills: 3    Associated Diagnoses: Coronary artery disease involving native coronary artery of native heart without angina pectoris      fluticasone (FLONASE) 50 MCG/ACT nasal spray Spray 2 sprays into both nostrils daily  Qty: 16 g, Refills: prn    Associated Diagnoses: Chronic sinusitis, unspecified location      atorvastatin (LIPITOR) 40 MG tablet Take 1 tablet (40 mg) by mouth daily  Qty: 90 tablet, Refills: 3    Associated Diagnoses: Coronary artery disease involving native coronary artery of native heart without angina pectoris      metoprolol (TOPROL XL) 25 MG 24 hr tablet Take 1 tablet (25 mg) by mouth daily  Qty: 90 tablet, Refills: 3    Associated Diagnoses: Coronary artery disease involving native coronary artery of native heart without angina pectoris      montelukast (SINGULAIR) 10 MG tablet Take 1 tablet (10 mg) by mouth At Bedtime  Qty: 90 tablet, Refills: 3    Associated Diagnoses: Chronic rhinitis      fluticasone (FLOVENT HFA) 110 MCG/ACT inhaler Inhale 2 puffs into the lungs 2 times daily  Qty: 3 Inhaler, Refills: 3    Associated Diagnoses: Mild intermittent asthma with exacerbation      sucralfate (CARAFATE) 1 GM tablet Take 1 tablet (1 g) by mouth 4 times daily May dissolve in 2 tsp water.  Qty: 360 tablet, Refills: 3    Associated Diagnoses: Epigastric pain; Hiatal hernia      omeprazole (PRILOSEC) 40 MG capsule Take 1 capsule (40 mg) by mouth every 12 hours  Qty: 180 capsule, Refills: 3    Associated Diagnoses: Gastroesophageal reflux disease without esophagitis       alendronate (FOSAMAX) 70 MG tablet Take 1 tablet (70 mg) by mouth every 7 days Take with over 8 ounces water and stay upright for at least 30 minutes after dose.  Take at least 60 minutes before breakfast.  Qty: 13 tablet, Refills: 3    Associated Diagnoses: Osteoporosis      LORazepam (ATIVAN) 1 MG tablet Take 0.5-1 tablets (0.5-1 mg) by mouth every 8 hours as needed for anxiety Take the night before a big event; may repeat in the AM if needed.  Qty: 10 tablet, Refills: 1    Associated Diagnoses: Anxiety      sertraline (ZOLOFT) 25 MG tablet Take 1 tablet (25 mg) by mouth daily  Qty: 90 tablet, Refills: 3    Associated Diagnoses: Anxiety      nitroglycerin (NITROSTAT) 0.4 MG SL tablet Place 1 tablet (0.4 mg) under the tongue every 5 minutes as needed for chest pain  Qty: 25 tablet, Refills: 3    Associated Diagnoses: CAD (coronary artery disease)      acetaminophen (TYLENOL) 325 MG tablet Take 2 tablets by mouth every 4 hours as needed.  Qty: 250 tablet      !! order for DME Equipment being ordered: immobilizer  Qty: 1 Device, Refills: 0    Associated Diagnoses: Tibial plateau fracture, right, closed, initial encounter      albuterol (PROAIR HFA, PROVENTIL HFA, VENTOLIN HFA) 108 (90 BASE) MCG/ACT inhaler Inhale 2 puffs into the lungs every 4 hours as needed for shortness of breath / dyspnea  Qty: 1 Inhaler, Refills: 2    Associated Diagnoses: Mild intermittent asthma with exacerbation       !! - Potential duplicate medications found. Please discuss with provider.      STOP taking these medications       HYDROcodone-acetaminophen (NORCO) 5-325 MG per tablet Comments:   Reason for Stopping:                          Follow-Up Care:  Patient should be seen in the office in 14 days by the Orthopedic Surgeon/Physician Assistant.  Call [phone] for appointment or questions.    Anjelica Chowdhury PA-C

## 2017-06-04 NOTE — PROGRESS NOTES
06/04/17 1600   Signing Clinician's Name / Credentials   Signing clinician's name / credentials Naomy Mcmillan PT, DPT CHELLE HAYWARD/T   Quick Adds   Rehab Discipline PT   ROM: Right Knee   Extension/Flexion - degrees 8-85 AAROM pain as barrier   Gait Training   Minutes of Treatment (Gait Training) 15   Symptoms Noted During/After Treatment (Gait Training) fatigue;increased pain   Treatment Detail Modiffied indepdnece with verbal cues nd supervisoin for getting up and out of chair and inand out of bed.  Gait focus on more eccentric control and the need to crate and release energy to gain strength ROM and allow for weight shifting.  Although pt had increased pain her gait improoved and hammad increased.  By end of gait her pain had subsided and feeling more relaxed.   [place] Level (Gait Training) contact guard   Physical Assistance Level (Gait Training) set-up required;supervision;verbal cues;nonverbal cues (demo/gestures)   Weight Bearing (Gait Training) weight-bearing as tolerated   Assistive Device (Gait Training) rolling walker   Gait Distance 150 ft x 2   Pattern Analysis (Gait Training) swing-through gait   Gait Analysis Deviations increased time in double stance;decreased velocity of limb motion;decreased weight-shifting ability   Impairments (Gait Analysis/Training) pain;ROM decreased;strength decreased   Therapeutic Exercise   Minutes of Treatment 25   Symptoms Noted During/After Treatment increased pain   Treatment Detail Written exercises issued: Verbal cues and tactile cues with dtr and spouse present. Education on how to give tactile cues at home and to perofmr on herleft side prior to right to yasminee mm memory and motor planning.  Worked with pt and family on [place] lying AAROM flexion and extension with pt foot on shoulder and pushing into flexion and pulling into extenison.  Pt agreed very comforatbvle and able to relax and increase ROM   Additional Documentation   Rehab Comments Comforted with  "today's sessin for d/c home.  Goals met   PT Plan D/C home with home care and written, verbal HEP and instructions.   [facility] AM-PAC  \"6 Clicks\" V.2 Basic Mobility Inpatient Short Form   1. Turning from your back to your side while in a flat bed without using bedrails? 4 - None   2. Moving from lying on your back to sitting on the side of a flat bed without using bedrails? 4 - None   3. Moving to and from a bed to a chair (including a wheelchair)? 4 - None   4. Standing up from a chair using your arms (e.g., wheelchair, or bedside chair)? 4 - None   5. To walk in hospital room? 4 - None   6. Climbing 3-5 steps with a railing? 3 - A Little   Basic Mobility Raw Score (Score out of 24.Lower scores equate to lower levels of function) 23   Total Session Time   Total Session Time (minutes) 40 minutes     "

## 2017-06-04 NOTE — PLAN OF CARE
Problem: Goal Outcome Summary  Goal: Goal Outcome Summary  Outcome: Completed Date Met:  06/04/17  PT:  PT completed this episode of care.  20 minutes spent with dtr and spouse on HEP and how best to maximize motion, managing pain and why she has more pain since she was in knee immobilizer for two weeks prior to surgery.  This appeared to comfort pt in pain and that although she had pain she was not hurting her knee.  No AD for discharge per pt and family.    Comments:   Recommendation:  Home with assistance and home care.

## 2017-06-04 NOTE — PLAN OF CARE
"Problem: Goal Outcome Summary  Goal: Goal Outcome Summary  Pt has had 2 med soft BM's this shift. Voiding without problem. Using IS & pulls to 1500. Did not want to use CPM during night, stated RLE \"stiff\" this morning & wanted to wait to use CPM until pain meds \"kicked in\". Oxycodone & tylenol given at this time & ice pack placed on right knee.       "

## 2017-06-04 NOTE — PROGRESS NOTES
"[facility] Orthopaedics Progress Note      Post-operative Day: 3 Days Post-Op    Procedure(s):  Right total knee arthroplasty - Wound Class: I-Clean      Subjective:    Pain: minimal  Chest pain, SOB:  No      Objective:  Blood pressure (!) 88/46, pulse 74, temperature 98.9  F (37.2  C), temperature source Oral, resp. rate 18, height 1.543 m (5' 0.75\"), weight 96.5 kg (212 lb 11.9 oz), SpO2 91 %.    Patient Vitals for the past 24 hrs:   BP Temp Temp src Pulse Resp SpO2   06/04/17 0740 (!) 88/46 98.9  F (37.2  C) Oral 74 18 91 %   06/04/17 0637 - 99.3  F (37.4  C) Oral - - -   06/03/17 2334 100/53 100  F (37.8  C) Oral 73 18 92 %   06/03/17 2100 113/64 - - 74 18 -   06/03/17 1529 101/52 98.8  F (37.1  C) Oral 70 16 -       Wt Readings from Last 4 Encounters:   06/01/17 96.5 kg (212 lb 11.9 oz)   05/31/17 88.5 kg (195 lb)   05/19/17 88.5 kg (195 lb)   05/15/17 88.5 kg (195 lb)         Motor function, sensation, and circulation intact   Yes  Wound status: incisions are clean dry and intact. Yes  Calf tenderness: Bilateral  No    Pertinent Labs   Lab Results: personally reviewed.     Recent Labs   Lab Test  06/04/17   0705  06/03/17   2150  06/03/17   0625  06/02/17   0625   07/28/13   1108  04/12/13   0115   05/31/12   0718   05/12/12   2356   05/12/12   1305   INR   --    --    --    --    --    --    --    --   1.11   --   1.11   --   1.06   HGB  8.9*  10.1*  9.5*  10.8*   --   13.1  13.7   < >  12.9   < >  13.1   --   13.5   HCT  27.9*   --    --    --    --   41.3  42.0   < >  39.2   < >  39.2   --   41.5   MCV  92   --    --    --    --   92  92   < >  89   < >  89   --   91   PLT  201   --    --    --    --   212  243   < >  207   < >  207   --   238   NA  135   --   133  137   < >  141  140   < >  141   < >   --    < >   --     < > = values in this interval not displayed.       Plan: Anticoagulation protocol:  mg daily  x 42  days            Pain medications:  oxycodone, tylenol, vistaril and flexeril    "         Weight bearing status:  WBAT            Disposition:  Home with OP PT today if passes PT, pain controlled po and approved by hospitalist.             Continue cares and rehabilitation.      Report completed by:  Anjelica Chowdhury PA-C, VIOLETA  Date: 6/4/2017  Time: 9:13 AM

## 2017-06-04 NOTE — PHARMACY - DISCHARGE MEDICATION RECONCILIATION
Discharge medication review for this patient is complete. Pharmacist assisted with medication reconciliation of discharge medications with prior to admission medications.     The following changes were made to the discharge medication list based on pharmacist review:  Added:  None  Discontinued: None  Changed: Ferrous Sulfate and Vitamin C to Vitron C (based on retail supply and original MD order).      Patient's Discharge Medication List  - medications as listed on After Visit Summary (AVS)     Review of your medicines      START taking       Dose / Directions    cyclobenzaprine 5 MG tablet   Commonly known as:  FLEXERIL        Dose:  5 mg   Take 1 tablet (5 mg) by mouth 3 times daily as needed for muscle spasms   Quantity:  21 tablet   Refills:  0       ferrous fumarate 65 mg ([place] [facility]. FE)-Vitamin C 125 mg  MG Tabs tablet   Commonly known as:  VITRON C        Dose:  1 tablet   Take 1 tablet by mouth 2 times daily   Quantity:  30 tablet   Refills:  1       hydrOXYzine 25 MG tablet   Commonly known as:  ATARAX        Dose:  25-50 mg   Take 1-2 tablets (25-50 mg) by mouth every 4 hours as needed for itching   Quantity:  30 tablet   Refills:  1       oxyCODONE 5 MG IR tablet   Commonly known as:  ROXICODONE        Dose:  5-10 mg   Take 1-2 tablets (5-10 mg) by mouth every 3 hours as needed for moderate to severe pain   Quantity:  60 tablet   Refills:  0       senna-docusate 8.6-50 MG per tablet   Commonly known as:  SENOKOT-S;PERICOLACE        Dose:  1-2 tablet   Take 1-2 tablets by mouth 2 times daily   Quantity:  100 tablet   Refills:  1         CONTINUE these medicines which may have CHANGED, or have new prescriptions. If we are uncertain of the size of tablets/capsules you have at home, strength may be listed as something that might have changed.       Dose / Directions    aspirin 325 MG EC tablet   This may have changed:    - medication strength  - how much to take        Dose:  325 mg   Take 1 tablet (325 mg)  by mouth daily   Quantity:  40 tablet   Refills:  0       * order for DME   This may have changed:  Another medication with the same name was added. Make sure you understand how and when to take each.   Used for:  Tibial plateau fracture, right, closed, initial encounter        Equipment being ordered: immobilizer   Quantity:  1 Device   Refills:  0       * order for DME   This may have changed:  You were already taking a medication with the same name, and this prescription was added. Make sure you understand how and when to take each.        Equipment being ordered: Walker Wheels () Treatment Diagnosis: s/p TKA   Quantity:  1 Units   Refills:  0       * Notice:  This list has 2 medication(s) that are the same as other medications prescribed for you. Read the directions carefully, and ask your doctor or other care provider to review them with you.      CONTINUE these medicines which have NOT CHANGED       Dose / Directions    acetaminophen 325 MG tablet   Commonly known as:  TYLENOL        Dose:  650 mg   Take 2 tablets by mouth every 4 hours as needed.   Quantity:  250 tablet   Refills:  0       albuterol 108 (90 BASE) MCG/ACT Inhaler   Commonly known as:  PROAIR HFA/PROVENTIL HFA/VENTOLIN HFA   Used for:  Mild intermittent asthma with exacerbation        Dose:  2 puff   Inhale 2 puffs into the lungs every 4 hours as needed for shortness of breath / dyspnea   Quantity:  1 Inhaler   Refills:  2       alendronate 70 MG tablet   Commonly known as:  FOSAMAX   Used for:  Osteoporosis   Notes to Patient:  Resume          Dose:  70 mg   Take 1 tablet (70 mg) by mouth every 7 days Take with over 8 ounces water and stay upright for at least 30 minutes after dose.  Take at least 60 minutes before breakfast.   Quantity:  13 tablet   Refills:  3       atorvastatin 40 MG tablet   Commonly known as:  LIPITOR   Used for:  Coronary artery disease involving native coronary artery of native heart without angina pectoris         Dose:  40 mg   Take 1 tablet (40 mg) by mouth daily   Quantity:  90 tablet   Refills:  3       fluticasone 110 MCG/ACT Inhaler   Commonly known as:  FLOVENT HFA   Used for:  Mild intermittent asthma with exacerbation        Dose:  2 puff   Inhale 2 puffs into the lungs 2 times daily   Quantity:  3 Inhaler   Refills:  3       fluticasone 50 MCG/ACT spray   Commonly known as:  FLONASE   Used for:  Chronic sinusitis, unspecified location        Dose:  2 spray   Spray 2 sprays into both nostrils daily   Quantity:  16 g   Refills:  prn       lisinopril 5 MG tablet   Commonly known as:  PRINIVIL/ZESTRIL   Used for:  Coronary artery disease involving native coronary artery of native heart without angina pectoris   Notes to Patient:  Resume          Dose:  5 mg   Take 1 tablet (5 mg) by mouth daily   Quantity:  90 tablet   Refills:  3       LORazepam 1 MG tablet   Commonly known as:  ATIVAN   Used for:  Anxiety        Dose:  0.5-1 mg   Take 0.5-1 tablets (0.5-1 mg) by mouth every 8 hours as needed for anxiety Take the night before a big event; may repeat in the AM if needed.   Quantity:  10 tablet   Refills:  1       metoprolol 25 MG 24 hr tablet   Commonly known as:  TOPROL XL   Used for:  Coronary artery disease involving native coronary artery of native heart without angina pectoris   Notes to Patient:  Resume          Dose:  25 mg   Take 1 tablet (25 mg) by mouth daily   Quantity:  90 tablet   Refills:  3       montelukast 10 MG tablet   Commonly known as:  SINGULAIR   Used for:  Chronic rhinitis        Dose:  10 mg   Take 1 tablet (10 mg) by mouth At Bedtime   Quantity:  90 tablet   Refills:  3       nitroglycerin 0.4 MG sublingual tablet   Commonly known as:  NITROSTAT   Used for:  CAD (coronary artery disease)        Dose:  0.4 mg   Place 1 tablet (0.4 mg) under the tongue every 5 minutes as needed for chest pain   Quantity:  25 tablet   Refills:  3       omeprazole 40 MG capsule   Commonly known as:  priLOSEC   Used  for:  Gastroesophageal reflux disease without esophagitis        Dose:  40 mg   Take 1 capsule (40 mg) by mouth every 12 hours   Quantity:  180 capsule   Refills:  3       sertraline 25 MG tablet   Commonly known as:  ZOLOFT   Used for:  Anxiety        Dose:  25 mg   Take 1 tablet (25 mg) by mouth daily   Quantity:  90 tablet   Refills:  3       sucralfate 1 GM tablet   Commonly known as:  CARAFATE   Used for:  Epigastric pain, Hiatal hernia        Dose:  1 g   Take 1 tablet (1 g) by mouth 4 times daily May dissolve in 2 tsp water.   Quantity:  360 tablet   Refills:  3         STOP taking          HYDROcodone-acetaminophen 5-325 MG per tablet   Commonly known as:  NORCO                Where to get your medicines      These medications were sent to [facility] [place], [place] - [address]  [address] [address]     Phone:  [phone]      aspirin 325 MG EC tablet     cyclobenzaprine 5 MG tablet     ferrous fumarate 65 mg ([place]. FE)-Vitamin C 125 mg  MG Tabs tablet     hydrOXYzine 25 MG tablet     senna-docusate 8.6-50 MG per tablet         Some of these will need a paper prescription and others can be bought over the counter. Ask your nurse if you have questions.     Bring a paper prescription for each of these medications      order for DME     oxyCODONE 5 MG IR tablet

## 2017-06-04 NOTE — PLAN OF CARE
"Problem: Goal Outcome Summary  Goal: Goal Outcome Summary  Pt up to the bathroom with walker &  minimal assist to lift right leg. Voided 500cc's clear yellow urine at this time. No BM but is passing flatus. States pain is \"ok right now.\" oxycodone 10mg given at this time to manage pain. Right knee dressing intact, ice on & CPM on at 50 degrees at this time.     2120: IVF ordered per MD. spoke with RAGINI dwyer regarding reasoning for order. Will start NS at 100cc/hr with labs ordered for a.m and will monitor creatinine (creat 1.29 today).  "

## 2017-06-04 NOTE — PROGRESS NOTES
[facility] Medicine Progress Note  Date of Service: 06/04/2017    Assessment & Plan   Anna Dahl is a 66 year old female who presented on 6/1/2017 for scheduled Procedure(s):  ARTHROPLASTY KNEE by Colin Krause MD and is being followed by the hospital medicine service for co-management of acute and/or chronic perioperative medical problems.    S/p Procedure(s):  ARTHROPLASTY KNEE  - POD #1  - Pain control, wound cares, physical therapy, occupational therapy and DVT prophylaxis per orthopedic surgery service    BHAVIK:  Likely prerenal azotemia especially with low blood pressure and low hemoglobin. Normal saline bolus X1L with lasix challenge in between the doses.  Hold Lisinopril  6/4/2017: Back to baseline with good urinary output. Advised to follow up with PCP within this week for a recheck.    Postop Anemia:   Hgb dropped to 9.5 from 10.8 yesterday. Will check q8 hourly. If this continues to drop especially after transfusion, may need to be transfused especially with her history of CAD.  6/4/2017: hemoglobin further down today but this is likely dilutional. Will plan to discharge her on iron supplements with close follow up with PCP.    Elevated glucose  Fasting glucose 144.  A1c 5.4%.    CAD (coronary artery disease)  - continue PTA lisinopril, metoprolol  6/3/2017: will at this point hold medications as BP is low and she has BHAVIK  6/4/2017: Medications help this admission but likely to need once she is back home- advised to restart tomorrow with close follow up with PCP    Anxiety  - continue PTA Zoloft, ativan    GERD (gastroesophageal reflux disease)  -continue PTA Carafate, Prilosec      Hyperlipidemia LDL goal <100  - continue PTA atorvastatin     Mild intermittent asthma with exacerbation  - continue PTA Flonase  - continue PTA Singulair  - continue PTA Flovent      DVT Prophylaxis: as per orthopedic surgery service - Defer to primary service  Code Status:  Full Code    Lines: PIV, without edema/erythema   Ramirez catheter: not indicated    Discussion: Medically, the patient appears stable.    Disposition: Anticipate discharge tomorrow or the following day. Patient plans to discharge to home with outpatient PT.  However, this may need to be re-addressed given pain issues..     Attestation:  I have reviewed today's vital signs, notes, medications, labs and imaging.    Luz Lares       Interval History   Pain is doing well today and has no complaints. She is now having good urinary output. The patient otherwise denies fever, CP, SOB, new cough, abdominal pain, dysuria, and lower extremity swelling, no dizziness or light headedness. Yet to have a bowel movement.      Vitals:    06/03/17 2334 06/04/17 0637 06/04/17 0740 06/04/17 0925   BP: 100/53  (!) 88/46 103/86   BP Location: Right arm  Right arm Right arm   Pulse: 73  74    Resp: 18  18    Temp: 100  F (37.8  C) 99.3  F (37.4  C) 98.9  F (37.2  C)    TempSrc: Oral Oral Oral    SpO2: 92%  91%    Weight:       Height:         General: alert and oriented x4, in no acute distress  CV: Regular rate/rhythm, no appreciable murmur, rub, or gallop  Respiratory: CTA bilaterally, equal chest expansion  GI: Soft, nontender, hypoactive but present BS  Skin: Warm and dry  Musculoskeletal: Negative homans bilaterally.  Tender to palpation of right calf while palpating posteriorly, however patient reports that the pain felt is in her knee.  No pain to palpation of left calf. no edema to lower extremities.  Neuro: equal  strength    Results for orders placed or performed during the hospital encounter of 06/01/17   XR Knee Port Right 1/2 Views    Narrative    PORTABLE RIGHT KNEE ONE-TWO VIEWS  6/1/2017 8:09 PM     HISTORY: Postop total knee.    COMPARISON: Exam performed on May 15, 2017.      Impression    IMPRESSION: 66-year-old patient with right total knee arthroplasty.  Knee arthroplasty components are normally  aligned. No periprosthetic  fracture. Knee effusion and air within the effusion noted, as expected  postoperatively. Alignment intact.    JOLYNN ZULETA MD   Basic metabolic panel   Result Value Ref Range    Sodium 137 133 - 144 mmol/L    Potassium 4.4 3.4 - 5.3 mmol/L    Chloride 104 94 - 109 mmol/L    Carbon Dioxide 24 20 - 32 mmol/L    Anion Gap 9 3 - 14 mmol/L    Glucose 144 (H) 70 - 99 mg/dL    Urea Nitrogen 16 7 - 30 mg/dL    Creatinine 0.86 0.52 - 1.04 mg/dL    GFR Estimate 66 >60 mL/min/1.7m2    GFR Estimate If Black 80 >60 mL/min/1.7m2    Calcium 8.5 8.5 - 10.1 mg/dL   Hemoglobin   Result Value Ref Range    Hemoglobin 10.8 (L) 11.7 - 15.7 g/dL   Hemoglobin A1c   Result Value Ref Range    Hemoglobin A1C 5.6 4.3 - 6.0 %   Hemoglobin   Result Value Ref Range    Hemoglobin 9.5 (L) 11.7 - 15.7 g/dL   Glucose   Result Value Ref Range    Glucose 119 (H) 70 - 99 mg/dL   Basic metabolic panel   Result Value Ref Range    Sodium 133 133 - 144 mmol/L    Potassium 3.9 3.4 - 5.3 mmol/L    Chloride 99 94 - 109 mmol/L    Carbon Dioxide 25 20 - 32 mmol/L    Anion Gap 9 3 - 14 mmol/L    Glucose 116 (H) 70 - 99 mg/dL    Urea Nitrogen 24 7 - 30 mg/dL    Creatinine 1.29 (H) 0.52 - 1.04 mg/dL    GFR Estimate 41 (L) >60 mL/min/1.7m2    GFR Estimate If Black 50 (L) >60 mL/min/1.7m2    Calcium 8.3 (L) 8.5 - 10.1 mg/dL   Hemoglobin   Result Value Ref Range    Hemoglobin 10.1 (L) 11.7 - 15.7 g/dL   CBC with platelets differential   Result Value Ref Range    WBC 6.2 4.0 - 11.0 10e9/L    RBC Count 3.03 (L) 3.8 - 5.2 10e12/L    Hemoglobin 8.9 (L) 11.7 - 15.7 g/dL    Hematocrit 27.9 (L) 35.0 - 47.0 %    MCV 92 78 - 100 fl    MCH 29.4 26.5 - 33.0 pg    MCHC 31.9 31.5 - 36.5 g/dL    RDW 12.8 10.0 - 15.0 %    Platelet Count 201 150 - 450 10e9/L    Diff Method Automated Method     % Neutrophils 67.6 %    % Lymphocytes 16.4 %    % Monocytes 9.6 %    % Eosinophils 5.6 %    % Basophils 0.3 %    % Immature Granulocytes 0.5 %    Absolute  Neutrophil 4.2 1.6 - 8.3 10e9/L    Absolute Lymphocytes 1.0 0.8 - 5.3 10e9/L    Absolute Monocytes 0.6 0.0 - 1.3 10e9/L    Absolute Eosinophils 0.4 0.0 - 0.7 10e9/L    Absolute Basophils 0.0 0.0 - 0.2 10e9/L    Abs Immature Granulocytes 0.0 0 - 0.4 10e9/L   Basic metabolic panel   Result Value Ref Range    Sodium 135 133 - 144 mmol/L    Potassium 3.8 3.4 - 5.3 mmol/L    Chloride 102 94 - 109 mmol/L    Carbon Dioxide 25 20 - 32 mmol/L    Anion Gap 8 3 - 14 mmol/L    Glucose 109 (H) 70 - 99 mg/dL    Urea Nitrogen 16 7 - 30 mg/dL    Creatinine 0.93 0.52 - 1.04 mg/dL    GFR Estimate 60 (L) >60 mL/min/1.7m2    GFR Estimate If Black 73 >60 mL/min/1.7m2    Calcium 8.0 (L) 8.5 - 10.1 mg/dL   Hemoglobin   Result Value Ref Range    Hemoglobin Canceled, Test credited   Duplicate request   11.7 - 15.7 g/dL   Care Transition RN/SW IP Consult    Narrative    Ghada Sapp RN     6/2/2017  5:10 PM  Care Transition Initial Assessment - RN  Reason For Consult: discharge planning   Met with: Patient and spouse.    DATA   Principal Problem:    S/P total knee arthroplasty  Active Problems:    GERD (gastroesophageal reflux disease)    Hyperlipidemia LDL goal <100    Mild intermittent asthma with exacerbation    CAD (coronary artery disease)    Anxiety    Elevated glucose       Primary Care Clinic Name: [facility]  Primary Care MD Name: Dr. Ramirez  Contact information and PCP information verified: Yes      ASSESSMENT  Cognitive Status: awake, alert and oriented.        Lives With: spouse  Living Arrangements: house     Description of Support System: Supportive, Involved   Who is your support system?:    Support Assessment: Adequate family and caregiver support   Insurance Concerns: No Insurance issues identified          This writer met with pt and spouse, introduced self and role.    Discussed home care, Pt was provided with Medicare certified home   care list. Pt chooses to use [facility]    ([phone] Fax: [phone]).  A referral was made to home   care.      PLAN    Home with [facility]      Ghada Sapp RN, Care Coordinator [phone]           Imaging  No results found for this or any previous visit (from the past 24 hour(s)).     I reviewed all new labs and imaging results over the last 24 hours. I personally reviewed no images or EKG's today.    Medications     NaCl 100 mL/hr at 06/04/17 0652       ferrous sulfate  325 mg Oral TID     ascorbic acid  250 mg Oral TID     cyclobenzaprine  5 mg Oral TID     atorvastatin  40 mg Oral Daily     fluticasone  2 spray Both Nostrils Daily     montelukast  10 mg Oral At Bedtime     omeprazole  40 mg Oral Q12H     sertraline  25 mg Oral Daily     sucralfate  1 g Oral 4x Daily     sodium chloride (PF)  3 mL Intracatheter Q8H     acetaminophen  975 mg Oral Q8H     senna-docusate  1-2 tablet Oral BID     aspirin EC  325 mg Oral Daily     fluticasone furoate  1 puff Inhalation Daily       Luz Lares

## 2017-06-04 NOTE — DISCHARGE INSTRUCTIONS
Surgical follow up   1.  Follow up with Suzette Salcido PA-C.  in 2 weeks for post op check and x rays as scheduled.  Call [phone] if appointment needed or questions  2.  Use pain medication as directed  3.  Keep incision clean, covered and dry until post op appointment.  You may shower and get incision wet if no drainage is present. You may change your dressing as needed.  No additional adhesives to be put on knee (including bandages).  Only use dry  guaze over [place] glue tape and then apply 4 inch ACE to hold dressings in place.   4.  Continue physical therapy as soon as possible.  You will need to call a therapy department of your choice to arrange future appointments.  Your order for physical therapy is included in your discharge paperwork.   5.  Take Aspirin 325 mg  daily  for 42 days for anticoagulation        Medical follow up:  Start taking daily iron supplements.  Restart blood pressure medications tomorrow or later on today if blood pressure starts running 140/90  Follow up with PCP this coming week for blood pressure checks and repeat kidney function tests.

## 2017-06-04 NOTE — PLAN OF CARE
Problem: Goal Outcome Summary  Goal: Goal Outcome Summary  Outcome: Completed Date Met:  06/04/17  [facility] DISCHARGE NOTE     Patient discharged to home at 1:50 PM via wheel chair. Accompanied by spouse and staff. Discharge instructions reviewed with patient and spouse, opportunity offered to ask questions. Prescriptions sent to patients preferred pharmacy. All belongings sent with patient.     Abbie Chin

## 2017-06-04 NOTE — CONSULTS
Transition Communication Hand-off for Care Transitions to Next Level of Care Provider    Name: Anna Dahl  MRN #: [id number]  Primary Care Provider: Nadia Ramirez  Primary Care MD Name: Dr. Ramirez  [facility] [address] [address]  Primary Care Clinic Name: [facility]  Reason for Hospitalization:  Right knee arthritis  S/P total knee arthroplasty  Admit Date/Time: 6/1/2017  1:34 PM  Discharge Date: 6/4/17  Payor Source: Payor: MEDICA / Plan: MEDICA PRIME SOLUTION / Product Type: Indemnity /     Readmission Assessment Measure (RAS) Risk Score/category: Low    Reason for Communication Hand-off Referral:  [facility]    Discharge Plan: Home with [facility] ([phone] Fax: [phone])       Concern for non-adherence with plan of care:   Y/N No  Discharge Needs Assessment:  Needs       Most Recent Value    # of Referrals Placed by [facility]          Already enrolled in Tele-monitoring program and name of program:  N/A  Follow-up specialty is recommended: No    Follow-up plan:  Future Appointments  Date Time Provider Department Center   6/4/2017 3:00 PM Naomy Mcmillan PT [facility] Sapp RN, Care Coordinator

## 2017-06-04 NOTE — PLAN OF CARE
Problem: Goal Outcome Summary  Goal: Goal Outcome Summary  Outcome: Completed Date Met:  06/04/17  OT Note: Pt has met all her OT goals able to return home with . Spoke to  concerning transfers and equipment at home. Min c/o R knee pain today. Able to amb to/from bathroom with Mod I with FWW     Recommendation: no further OT services recommended at this time. D/C home with

## 2017-06-08 ENCOUNTER — OFFICE VISIT (OUTPATIENT)
Dept: FAMILY MEDICINE | Facility: CLINIC | Age: 67
End: 2017-06-08
Payer: COMMERCIAL

## 2017-06-08 VITALS — HEART RATE: 68 BPM | SYSTOLIC BLOOD PRESSURE: 110 MMHG | DIASTOLIC BLOOD PRESSURE: 73 MMHG

## 2017-06-08 DIAGNOSIS — D62 POSTOPERATIVE ANEMIA DUE TO ACUTE BLOOD LOSS: Primary | ICD-10-CM

## 2017-06-08 DIAGNOSIS — R10.13 EPIGASTRIC PAIN: ICD-10-CM

## 2017-06-08 DIAGNOSIS — K44.9 HIATAL HERNIA: ICD-10-CM

## 2017-06-08 DIAGNOSIS — M81.0 OSTEOPOROSIS: ICD-10-CM

## 2017-06-08 DIAGNOSIS — R39.9 SYMPTOMS INVOLVING URINARY SYSTEM: ICD-10-CM

## 2017-06-08 DIAGNOSIS — I25.10 CORONARY ARTERY DISEASE INVOLVING NATIVE CORONARY ARTERY OF NATIVE HEART WITHOUT ANGINA PECTORIS: ICD-10-CM

## 2017-06-08 DIAGNOSIS — J45.21 MILD INTERMITTENT ASTHMA WITH EXACERBATION: ICD-10-CM

## 2017-06-08 DIAGNOSIS — K21.9 GASTROESOPHAGEAL REFLUX DISEASE WITHOUT ESOPHAGITIS: ICD-10-CM

## 2017-06-08 DIAGNOSIS — J31.0 CHRONIC RHINITIS: ICD-10-CM

## 2017-06-08 DIAGNOSIS — J32.9 CHRONIC SINUSITIS, UNSPECIFIED LOCATION: ICD-10-CM

## 2017-06-08 DIAGNOSIS — F41.9 ANXIETY: ICD-10-CM

## 2017-06-08 LAB — HGB BLD-MCNC: 9.1 G/DL (ref 11.7–15.7)

## 2017-06-08 PROCEDURE — 99213 OFFICE O/P EST LOW 20 MIN: CPT | Performed by: FAMILY MEDICINE

## 2017-06-08 PROCEDURE — 36415 COLL VENOUS BLD VENIPUNCTURE: CPT | Performed by: FAMILY MEDICINE

## 2017-06-08 PROCEDURE — 85018 HEMOGLOBIN: CPT | Performed by: FAMILY MEDICINE

## 2017-06-08 RX ORDER — SUCRALFATE 1 G/1
1 TABLET ORAL 4 TIMES DAILY
Qty: 360 TABLET | Refills: 3 | Status: SHIPPED | OUTPATIENT
Start: 2017-06-08 | End: 2018-04-27

## 2017-06-08 RX ORDER — ATORVASTATIN CALCIUM 40 MG/1
40 TABLET, FILM COATED ORAL DAILY
Qty: 90 TABLET | Refills: 3 | Status: SHIPPED | OUTPATIENT
Start: 2017-06-08 | End: 2018-04-27

## 2017-06-08 RX ORDER — MONTELUKAST SODIUM 10 MG/1
10 TABLET ORAL AT BEDTIME
Qty: 90 TABLET | Refills: 3 | Status: SHIPPED | OUTPATIENT
Start: 2017-06-08 | End: 2018-08-07

## 2017-06-08 RX ORDER — LISINOPRIL 5 MG/1
5 TABLET ORAL DAILY
Qty: 90 TABLET | Refills: 3 | Status: SHIPPED | OUTPATIENT
Start: 2017-06-08 | End: 2018-05-11

## 2017-06-08 RX ORDER — METOPROLOL SUCCINATE 25 MG/1
25 TABLET, EXTENDED RELEASE ORAL DAILY
Qty: 90 TABLET | Refills: 3 | Status: SHIPPED | OUTPATIENT
Start: 2017-06-08 | End: 2018-04-27

## 2017-06-08 RX ORDER — OMEPRAZOLE 40 MG/1
40 CAPSULE, DELAYED RELEASE ORAL EVERY 12 HOURS
Qty: 180 CAPSULE | Refills: 3 | Status: SHIPPED | OUTPATIENT
Start: 2017-06-08 | End: 2018-08-02

## 2017-06-08 RX ORDER — ALENDRONATE SODIUM 70 MG/1
70 TABLET ORAL
Qty: 13 TABLET | Refills: 3 | Status: SHIPPED | OUTPATIENT
Start: 2017-06-08 | End: 2018-04-27

## 2017-06-08 RX ORDER — ALBUTEROL SULFATE 90 UG/1
2 AEROSOL, METERED RESPIRATORY (INHALATION) EVERY 4 HOURS PRN
Qty: 1 INHALER | Refills: 2 | Status: SHIPPED | OUTPATIENT
Start: 2017-06-08 | End: 2018-04-27

## 2017-06-08 RX ORDER — FLUTICASONE PROPIONATE 110 UG/1
2 AEROSOL, METERED RESPIRATORY (INHALATION) 2 TIMES DAILY
Qty: 3 INHALER | Refills: 3 | Status: SHIPPED | OUTPATIENT
Start: 2017-06-08 | End: 2018-04-27

## 2017-06-08 RX ORDER — SERTRALINE HYDROCHLORIDE 25 MG/1
25 TABLET, FILM COATED ORAL DAILY
Qty: 90 TABLET | Refills: 3 | Status: SHIPPED | OUTPATIENT
Start: 2017-06-08 | End: 2018-04-27

## 2017-06-08 RX ORDER — FLUTICASONE PROPIONATE 50 MCG
2 SPRAY, SUSPENSION (ML) NASAL DAILY
Qty: 16 G | Status: SHIPPED | OUTPATIENT
Start: 2017-06-08 | End: 2018-04-27

## 2017-06-08 NOTE — PROGRESS NOTES
SUBJECTIVE:                                                    Anna Dahl is a 66 year old female who presents to clinic today for the following health issues:    Chief Complaint   Patient presents with     Hospital F/U     total right knee replacement     Blood Draw     hemoglobin recheck     Recheck Medication     refills     Hospital Follow-up Visit:  Hospital/Nursing Home/IP Rehab Facility: [facility]  Date of Admission: 6/1/17  Date of Discharge: 6/4/17  Reason(s) for Admission: Knee replacement          Problems taking medications regularly:  None       Medication changes since discharge: None       Problems adhering to non-medication therapy:  None  Summary of hospitalization:  [facility] discharge summary reviewed  Diagnostic Tests/Treatments reviewed.  Follow up needed: follow up Hgb; she was placed on FeSO4 BID for anemia secondary to acute blood loss  Other Healthcare Providers Involved in Patient s Care:         orthopedist, home PT  Update since discharge: improved.     Post Discharge Medication Reconciliation: discharge medications reconciled, continue medications without change.  Plan of care communicated with patient     Coding guidelines for this visit:  Type of Medical   Decision Making Face-to-Face Visit       within 7 Days of discharge Face-to-Face Visit        within 14 days of discharge   Moderate Complexity [id number]   High Complexity [id number]          OBJECTIVE: /73 (BP Location: Right arm, Patient Position: Chair, Cuff Size: Adult Regular)  Pulse 68    She is seated in a wheelchair, has bracing on her right knee.  Component       Hemoglobin   Latest Ref Rng & Units       11.7 - 15.7 g/dL   6/2/2017       10.8 (L)   6/3/2017      6:25 AM 9.5 (L)   6/3/2017      9:50 PM 10.1 (L)   6/4/2017      7:05 AM 8.9 (L)   6/8/2017       9.1 (L)       ASSESSMENT: s.p knee replacement    Anemia secondary to acute blood loss    PLAN: Continue BID iron supplement.  Routine PT and  Ortho follow-up.  Plan recheck Hgb in one month.   Renew routine medications as needed. See orders.    Nadia Ramirez md

## 2017-06-08 NOTE — MR AVS SNAPSHOT
After Visit Summary   6/8/2017    Anna Dahl    MRN: [id number]           Patient Information     Date Of Birth          1950        Visit Information        Provider Department      6/8/2017 2:40 PM Nadia Ramirez MD [facility]        Today's Diagnoses     Postoperative anemia due to acute blood loss    -  1    Coronary artery disease involving native coronary artery of native heart without angina pectoris        Chronic sinusitis, unspecified location        Chronic rhinitis        Mild intermittent asthma with exacerbation        Epigastric pain        Hiatal hernia        Gastroesophageal reflux disease without esophagitis        Osteoporosis           Follow-ups after your visit        Who to contact     If you have questions or need follow up information about today's clinic visit or your schedule please contact [facility] directly at [phone].  Normal or non-critical lab and imaging results will be communicated to you by MyChart, letter or phone within 4 business days after the clinic has received the results. If you do not hear from us within 7 days, please contact the clinic through [employer] or phone. If you have a critical or abnormal lab result, we will notify you by phone as soon as possible.  Submit refill requests through [employer] or call your pharmacy and they will forward the refill request to us. Please allow 3 business days for your refill to be completed.          Additional Information About Your Visit        MyChart Information     [employer] gives you secure access to your electronic health record. If you see a primary care provider, you can also send messages to your care team and make appointments. If you have questions, please call your primary care clinic.  If you do not have a primary care provider, please call [phone] and they will assist you.        Care EveryWhere ID     This is your Care EveryWhere ID. This could  be used by other organizations to access your [place] medical records  [id number]        Your Vitals Were     Pulse                   68            Blood Pressure from Last 3 Encounters:   06/08/17 110/73   06/04/17 103/86   05/31/17 125/76    Weight from Last 3 Encounters:   06/01/17 212 lb 11.9 oz (96.5 kg)   05/31/17 195 lb (88.5 kg)   05/19/17 195 lb (88.5 kg)              We Performed the Following     Hemoglobin          Where to get your medicines      [facility] [address]  [address], [place] [address]    Hours:  [facility] Phone:  [phone]     atorvastatin 40 MG tablet    fluticasone 110 MCG/ACT Inhaler    fluticasone 50 MCG/ACT spray    lisinopril 5 MG tablet    metoprolol 25 MG 24 hr tablet    montelukast 10 MG tablet    omeprazole 40 MG capsule    sucralfate 1 GM tablet         Some of these will need a paper prescription and others can be bought over the counter.  Ask your nurse if you have questions.     Bring a paper prescription for each of these medications     alendronate 70 MG tablet          Primary Care Provider Office Phone # Fax #    Nadia Ramirez [phone]       [facility] [address] [facility] [address]        Thank you!     Thank you for choosing [facility]  for your care. Our goal is always to provide you with excellent care. Hearing back from our patients is one way we can continue to improve our services. Please take a few minutes to complete the written survey that you may receive in the mail after your visit with us. Thank you!             Your Updated Medication List - Protect others around you: Learn how to safely use, store and throw away your medicines at www.disposemymeds.org.          This list is accurate as of: 6/8/17  3:13 PM.  Always use your most recent med list.                   Brand Name Dispense  Instructions for use    acetaminophen 325 MG tablet    TYLENOL    250 tablet    Take 2 tablets by mouth every 4 hours as needed.       albuterol 108 (90 BASE) MCG/ACT Inhaler    PROAIR HFA/PROVENTIL HFA/VENTOLIN HFA    1 Inhaler    Inhale 2 puffs into the lungs every 4 hours as needed for shortness of breath / dyspnea       alendronate 70 MG tablet    FOSAMAX    13 tablet    Take 1 tablet (70 mg) by mouth every 7 days Take with over 8 ounces water and stay upright for at least 30 minutes after dose.  Take at least 60 minutes before breakfast.       aspirin 325 MG EC tablet     40 tablet    Take 1 tablet (325 mg) by mouth daily       atorvastatin 40 MG tablet    LIPITOR    90 tablet    Take 1 tablet (40 mg) by mouth daily       cyclobenzaprine 5 MG tablet    FLEXERIL    21 tablet    Take 1 tablet (5 mg) by mouth 3 times daily as needed for muscle spasms       ferrous fumarate 65 mg ([place]. FE)-Vitamin C 125 mg  MG Tabs tablet    VITRON C    30 tablet    Take 1 tablet by mouth 2 times daily       fluticasone 110 MCG/ACT Inhaler    FLOVENT HFA    3 Inhaler    Inhale 2 puffs into the lungs 2 times daily       fluticasone 50 MCG/ACT spray    FLONASE    16 g    Spray 2 sprays into both nostrils daily       hydrOXYzine 25 MG tablet    ATARAX    30 tablet    Take 1-2 tablets (25-50 mg) by mouth every 4 hours as needed for itching       lisinopril 5 MG tablet    PRINIVIL/ZESTRIL    90 tablet    Take 1 tablet (5 mg) by mouth daily       LORazepam 1 MG tablet    ATIVAN    10 tablet    Take 0.5-1 tablets (0.5-1 mg) by mouth every 8 hours as needed for anxiety Take the night before a big event; may repeat in the AM if needed.       metoprolol 25 MG 24 hr tablet    TOPROL XL    90 tablet    Take 1 tablet (25 mg) by mouth daily       montelukast 10 MG tablet    SINGULAIR    90 tablet    Take 1 tablet (10 mg) by mouth At Bedtime       nitroglycerin 0.4 MG sublingual tablet    NITROSTAT    25 tablet    Place 1 tablet (0.4 mg)  under the tongue every 5 minutes as needed for chest pain       omeprazole 40 MG capsule    priLOSEC    180 capsule    Take 1 capsule (40 mg) by mouth every 12 hours       * order for DME     1 Device    Equipment being ordered: immobilizer       * order for DME     1 Units    Equipment being ordered: Walker Wheels () Treatment Diagnosis: s/p TKA       oxyCODONE 5 MG IR tablet    ROXICODONE    60 tablet    Take 1-2 tablets (5-10 mg) by mouth every 3 hours as needed for moderate to severe pain       senna-docusate 8.6-50 MG per tablet    SENOKOT-S;PERICOLACE    100 tablet    Take 1-2 tablets by mouth 2 times daily       sertraline 25 MG tablet    ZOLOFT    90 tablet    Take 1 tablet (25 mg) by mouth daily       sucralfate 1 GM tablet    CARAFATE    360 tablet    Take 1 tablet (1 g) by mouth 4 times daily May dissolve in 2 tsp water.       * Notice:  This list has 2 medication(s) that are the same as other medications prescribed for you. Read the directions carefully, and ask your doctor or other care provider to review them with you.

## 2017-06-12 ENCOUNTER — APPOINTMENT (OUTPATIENT)
Dept: LAB | Facility: CLINIC | Age: 67
End: 2017-06-12
Attending: FAMILY MEDICINE
Payer: MEDICARE

## 2017-06-12 ENCOUNTER — TELEPHONE (OUTPATIENT)
Dept: FAMILY MEDICINE | Facility: CLINIC | Age: 67
End: 2017-06-12

## 2017-06-12 DIAGNOSIS — R39.9 SYMPTOMS OF URINARY TRACT INFECTION: Primary | ICD-10-CM

## 2017-06-12 DIAGNOSIS — R39.9 SYMPTOMS INVOLVING URINARY SYSTEM: ICD-10-CM

## 2017-06-12 LAB
ALBUMIN UR-MCNC: NEGATIVE MG/DL
APPEARANCE UR: CLEAR
BACTERIA #/AREA URNS HPF: ABNORMAL /HPF
BILIRUB UR QL STRIP: NEGATIVE
COLOR UR AUTO: YELLOW
GLUCOSE UR STRIP-MCNC: NEGATIVE MG/DL
HGB UR QL STRIP: NEGATIVE
KETONES UR STRIP-MCNC: NEGATIVE MG/DL
LEUKOCYTE ESTERASE UR QL STRIP: ABNORMAL
NITRATE UR QL: NEGATIVE
NON-SQ EPI CELLS #/AREA URNS LPF: ABNORMAL /LPF
PH UR STRIP: 7 PH (ref 5–7)
RBC #/AREA URNS AUTO: ABNORMAL /HPF (ref 0–2)
SP GR UR STRIP: 1.01 (ref 1–1.03)
URN SPEC COLLECT METH UR: ABNORMAL
UROBILINOGEN UR STRIP-ACNC: 0.2 EU/DL (ref 0.2–1)
WBC #/AREA URNS AUTO: ABNORMAL /HPF (ref 0–2)

## 2017-06-12 PROCEDURE — 81001 URINALYSIS AUTO W/SCOPE: CPT | Performed by: FAMILY MEDICINE

## 2017-06-12 PROCEDURE — 87186 SC STD MICRODIL/AGAR DIL: CPT | Performed by: FAMILY MEDICINE

## 2017-06-12 PROCEDURE — 87088 URINE BACTERIA CULTURE: CPT | Performed by: FAMILY MEDICINE

## 2017-06-12 PROCEDURE — 87086 URINE CULTURE/COLONY COUNT: CPT | Performed by: FAMILY MEDICINE

## 2017-06-12 RX ORDER — SULFAMETHOXAZOLE/TRIMETHOPRIM 800-160 MG
1 TABLET ORAL 2 TIMES DAILY
Qty: 6 TABLET | Refills: 0 | Status: SHIPPED | OUTPATIENT
Start: 2017-06-12 | End: 2017-06-15

## 2017-06-12 NOTE — TELEPHONE ENCOUNTER
Patient's home care nurse reporting:  Patient has urinary frequency and urgency  Patient does not have fever, foul smelling urine, back pain, or pain upon urination  Patient can hardly make it to the bathroom urinating  She is asking for verbal order for UA/UC  Writer gave verbal order for UA/UC  Orders pended  Please advise    Routing to provider.    Nevin JENKINS Rn

## 2017-06-12 NOTE — TELEPHONE ENCOUNTER
Reason for Call: Request for an order or referral:    Order or referral being requested: Pt's homecare RN Andree calling.  Pt has urgency and frequency with urination and can barely make it to the bathroom.  She would like an order placed asap for a UA.      Date needed: as soon as possible    Has the patient been seen by the PCP for this problem? YES    Additional comments:     Phone number Patient can be reached at:  Other phone number:  [phone]    Best Time:      Can we leave a detailed message on this number?  YES    Call taken on 6/12/2017 at 8:22 AM by Christina Rolle

## 2017-06-13 ENCOUNTER — TELEPHONE (OUTPATIENT)
Dept: FAMILY MEDICINE | Facility: CLINIC | Age: 67
End: 2017-06-13

## 2017-06-13 NOTE — TELEPHONE ENCOUNTER
Patient's  Donald called and told of a UC taking 48 hours. To call back tomorrow. Jeanette VU RN

## 2017-06-13 NOTE — TELEPHONE ENCOUNTER
Reason for call:  Patient reporting a symptom    Symptom or request: Pt's spouse Jung mccauley - Pt was seen by Dr. Ramirez and treated for a UTI and he wants to know if culture came back and if she is on the correct antibiotic?      Duration (how long have symptoms been present): ongoing    Have you been treated for this before? Yes    Additional comments:     Phone Number patient can be reached at:  Home number on file [phone] (home)    Best Time:  any    Can we leave a detailed message on this number:  YES    Call taken on 6/13/2017 at 2:07 PM by Christina Rolle

## 2017-06-14 ENCOUNTER — TELEPHONE (OUTPATIENT)
Dept: FAMILY MEDICINE | Facility: CLINIC | Age: 67
End: 2017-06-14

## 2017-06-14 LAB
BACTERIA SPEC CULT: ABNORMAL
MICRO REPORT STATUS: ABNORMAL
MICROORGANISM SPEC CULT: ABNORMAL
SPECIMEN SOURCE: ABNORMAL

## 2017-06-14 NOTE — TELEPHONE ENCOUNTER
Looks like urine culture is back today.  Can you advise in Dr. Ramirez' absence?     Nohemi Schafer RN

## 2017-06-14 NOTE — TELEPHONE ENCOUNTER
Reason for Call:  Request for results:    Name of test or procedure: Pt's spouse Jung calling.  Pt's Homecare RN ran a urine test on Monday and he is wanting the results asap so that pt can start treatment.  He would like a call bask asap please.      Date of test of procedure: 06/12/17    Location of the test or procedure: [facility]    OK to leave the result message on voice mail or with a family member? YES    Phone number Patient can be reached at:  Cell number on file:    Telephone Information:   Mobile [phone]     Additional comments: any    Call taken on 6/14/2017 at 11:01 AM by Christina Rolle

## 2017-06-14 NOTE — TELEPHONE ENCOUNTER
Urine culture was positive for an infection and it looks like she was prescribed Septra or Bactrim which is sensitive to the bacteria found in the culture.  If she hasn't started taking that antibiotic, she should and should respond well.  Follow up if symptoms persist or worsen and as needed.  Tanya Knox, CHRISTIANONP

## 2017-06-15 ENCOUNTER — TELEPHONE (OUTPATIENT)
Dept: FAMILY MEDICINE | Facility: CLINIC | Age: 67
End: 2017-06-15

## 2017-06-15 DIAGNOSIS — N39.0 UTI DUE TO KLEBSIELLA SPECIES: Primary | ICD-10-CM

## 2017-06-15 DIAGNOSIS — R39.9 SYMPTOMS OF URINARY TRACT INFECTION: ICD-10-CM

## 2017-06-15 DIAGNOSIS — B96.89 UTI DUE TO KLEBSIELLA SPECIES: Primary | ICD-10-CM

## 2017-06-15 LAB
BACTERIA SPEC CULT: NORMAL
MICRO REPORT STATUS: NORMAL
SPECIMEN SOURCE: NORMAL

## 2017-06-15 RX ORDER — SULFAMETHOXAZOLE/TRIMETHOPRIM 800-160 MG
1 TABLET ORAL 2 TIMES DAILY
Qty: 14 TABLET | Refills: 0 | Status: SHIPPED | OUTPATIENT
Start: 2017-06-15 | End: 2019-05-20

## 2017-06-15 NOTE — TELEPHONE ENCOUNTER
S-(situation): UTI symptoms persist    B-(background): Patient states she is still having UTI symptoms of frequency and urgency.  If symptoms persisted after 3 days, she was to call clinic back for more antibiotics.  Results  Anna,   Your urine grew Klebsiella, a fairly common cause of bladder infections. It is sensitive to the trimthoprim/sulfa antibiotic we gave you, and most women will find three days of this to be sufficient.  If you still have symptoms after three days, call the clinic and we can extend it for another three days if needed.   Nadia Ramirez md    A-(assessment): UTI symptoms    R-(recommendations): Please advise.  Pharm ready.    Routing to provider.  Xiomara JENKINS RN

## 2017-06-15 NOTE — TELEPHONE ENCOUNTER
Reason for Call:  Other call back    Detailed comments: She has a UTI.  She was treated with Bactrim.  She took that for 3 days.  She is still having frequency and urgency.  Please advise.    Phone Number Patient can be reached at: Home number on file [phone] (home)    Best Time: any    Can we leave a detailed message on this number? YES    Call taken on 6/15/2017 at 3:44 PM by Naomy Crow

## 2017-06-16 ENCOUNTER — TELEPHONE (OUTPATIENT)
Dept: FAMILY MEDICINE | Facility: CLINIC | Age: 67
End: 2017-06-16

## 2017-06-16 NOTE — TELEPHONE ENCOUNTER
S-(situation): Maria Elena from  home care called to report that she saw pt 4 days ago and again today.  Maria Elena observes that pt is complaining of chest pain.  The chest pain is positional; exacerbated whenever she raises her arms to her sides or if raises her arms up.  Pt is new to using her walker.  She is s/p right total knee replacement and ambulates with her walker.   Vital signs are stable.  Denies sharp pains.  Denies shortness of breath, wheezing or breathing difficulties.  Denies chest tightness.    Denies new back pain.  Denies belly pain.   Denies cough.  Denies swelling of extremities.    B-(background): prior h/o cardiac stents, 6.      A-(assessment): chest pain, positional.    R-(recommendations): Given that pain is associated with raising her arms up and out toward her sides and no other symptoms is likely positional.  Pt is new to using a walker while recovering from right total knee.  Pt will need to seek emergency care if new or worsening chest pain.  Call back promptly and report if develop additional symptoms.  Debora Vincent RN

## 2017-06-17 ENCOUNTER — HOSPITAL ENCOUNTER (EMERGENCY)
Facility: CLINIC | Age: 67
Discharge: HOME | End: 2017-06-17
Attending: EMERGENCY MEDICINE | Admitting: EMERGENCY MEDICINE
Payer: MEDICARE

## 2017-06-17 ENCOUNTER — APPOINTMENT (OUTPATIENT)
Dept: ULTRASOUND IMAGING | Facility: CLINIC | Age: 67
End: 2017-06-17
Attending: EMERGENCY MEDICINE
Payer: MEDICARE

## 2017-06-17 ENCOUNTER — APPOINTMENT (OUTPATIENT)
Dept: CT IMAGING | Facility: CLINIC | Age: 67
End: 2017-06-17
Attending: EMERGENCY MEDICINE
Payer: MEDICARE

## 2017-06-17 VITALS
TEMPERATURE: 97.8 F | RESPIRATION RATE: 11 BRPM | SYSTOLIC BLOOD PRESSURE: 120 MMHG | OXYGEN SATURATION: 92 % | DIASTOLIC BLOOD PRESSURE: 72 MMHG

## 2017-06-17 DIAGNOSIS — N30.00 ACUTE CYSTITIS WITHOUT HEMATURIA: ICD-10-CM

## 2017-06-17 DIAGNOSIS — R07.89 CHEST WALL PAIN: ICD-10-CM

## 2017-06-17 LAB
ALBUMIN SERPL-MCNC: 3.6 G/DL (ref 3.4–5)
ALBUMIN UR-MCNC: NEGATIVE MG/DL
ALP SERPL-CCNC: 113 U/L (ref 40–150)
ALT SERPL W P-5'-P-CCNC: 25 U/L (ref 0–50)
ANION GAP SERPL CALCULATED.3IONS-SCNC: 7 MMOL/L (ref 3–14)
APPEARANCE UR: CLEAR
AST SERPL W P-5'-P-CCNC: 19 U/L (ref 0–45)
BACTERIA #/AREA URNS HPF: ABNORMAL /HPF
BASOPHILS # BLD AUTO: 0 10E9/L (ref 0–0.2)
BASOPHILS NFR BLD AUTO: 0.5 %
BILIRUB SERPL-MCNC: 0.3 MG/DL (ref 0.2–1.3)
BILIRUB UR QL STRIP: NEGATIVE
BUN SERPL-MCNC: 14 MG/DL (ref 7–30)
CALCIUM SERPL-MCNC: 9.2 MG/DL (ref 8.5–10.1)
CHLORIDE SERPL-SCNC: 104 MMOL/L (ref 94–109)
CK SERPL-CCNC: 69 U/L (ref 30–225)
CO2 SERPL-SCNC: 26 MMOL/L (ref 20–32)
COLOR UR AUTO: YELLOW
CREAT SERPL-MCNC: 1.09 MG/DL (ref 0.52–1.04)
D DIMER PPP FEU-MCNC: 6.3 UG/ML FEU (ref 0–0.5)
DIFFERENTIAL METHOD BLD: ABNORMAL
EOSINOPHIL # BLD AUTO: 0.5 10E9/L (ref 0–0.7)
EOSINOPHIL NFR BLD AUTO: 6.1 %
ERYTHROCYTE [DISTWIDTH] IN BLOOD BY AUTOMATED COUNT: 13.5 % (ref 10–15)
GFR SERPL CREATININE-BSD FRML MDRD: 50 ML/MIN/1.7M2
GLUCOSE SERPL-MCNC: 108 MG/DL (ref 70–99)
GLUCOSE UR STRIP-MCNC: NEGATIVE MG/DL
HCT VFR BLD AUTO: 36.4 % (ref 35–47)
HGB BLD-MCNC: 11.2 G/DL (ref 11.7–15.7)
HGB UR QL STRIP: ABNORMAL
IMM GRANULOCYTES # BLD: 0 10E9/L (ref 0–0.4)
IMM GRANULOCYTES NFR BLD: 0.4 %
KETONES UR STRIP-MCNC: NEGATIVE MG/DL
LEUKOCYTE ESTERASE UR QL STRIP: ABNORMAL
LYMPHOCYTES # BLD AUTO: 1.6 10E9/L (ref 0.8–5.3)
LYMPHOCYTES NFR BLD AUTO: 20.2 %
MCH RBC QN AUTO: 28.3 PG (ref 26.5–33)
MCHC RBC AUTO-ENTMCNC: 30.8 G/DL (ref 31.5–36.5)
MCV RBC AUTO: 92 FL (ref 78–100)
MONOCYTES # BLD AUTO: 0.7 10E9/L (ref 0–1.3)
MONOCYTES NFR BLD AUTO: 8.6 %
MUCOUS THREADS #/AREA URNS LPF: PRESENT /LPF
NEUTROPHILS # BLD AUTO: 5.1 10E9/L (ref 1.6–8.3)
NEUTROPHILS NFR BLD AUTO: 64.2 %
NITRATE UR QL: POSITIVE
PH UR STRIP: 8 PH (ref 5–7)
PLATELET # BLD AUTO: 542 10E9/L (ref 150–450)
POTASSIUM SERPL-SCNC: 4.3 MMOL/L (ref 3.4–5.3)
PROT SERPL-MCNC: 7.9 G/DL (ref 6.8–8.8)
RBC # BLD AUTO: 3.96 10E12/L (ref 3.8–5.2)
RBC #/AREA URNS AUTO: 44 /HPF (ref 0–2)
SODIUM SERPL-SCNC: 137 MMOL/L (ref 133–144)
SP GR UR STRIP: 1.03 (ref 1–1.03)
SQUAMOUS #/AREA URNS AUTO: <1 /HPF (ref 0–1)
TROPONIN I SERPL-MCNC: NORMAL UG/L (ref 0–0.04)
URN SPEC COLLECT METH UR: ABNORMAL
UROBILINOGEN UR STRIP-MCNC: NORMAL MG/DL (ref 0–2)
WBC # BLD AUTO: 7.9 10E9/L (ref 4–11)
WBC #/AREA URNS AUTO: 70 /HPF (ref 0–2)

## 2017-06-17 PROCEDURE — 80053 COMPREHEN METABOLIC PANEL: CPT | Performed by: EMERGENCY MEDICINE

## 2017-06-17 PROCEDURE — 85025 COMPLETE CBC W/AUTO DIFF WBC: CPT | Performed by: EMERGENCY MEDICINE

## 2017-06-17 PROCEDURE — 84484 ASSAY OF TROPONIN QUANT: CPT | Performed by: EMERGENCY MEDICINE

## 2017-06-17 PROCEDURE — 85379 FIBRIN DEGRADATION QUANT: CPT | Performed by: EMERGENCY MEDICINE

## 2017-06-17 PROCEDURE — 96365 THER/PROPH/DIAG IV INF INIT: CPT | Mod: 59

## 2017-06-17 PROCEDURE — 87088 URINE BACTERIA CULTURE: CPT | Performed by: EMERGENCY MEDICINE

## 2017-06-17 PROCEDURE — 25000128 H RX IP 250 OP 636: Performed by: EMERGENCY MEDICINE

## 2017-06-17 PROCEDURE — 99285 EMERGENCY DEPT VISIT HI MDM: CPT | Mod: 25 | Performed by: EMERGENCY MEDICINE

## 2017-06-17 PROCEDURE — 87186 SC STD MICRODIL/AGAR DIL: CPT | Performed by: EMERGENCY MEDICINE

## 2017-06-17 PROCEDURE — 96361 HYDRATE IV INFUSION ADD-ON: CPT

## 2017-06-17 PROCEDURE — 99285 EMERGENCY DEPT VISIT HI MDM: CPT | Mod: 25

## 2017-06-17 PROCEDURE — 96375 TX/PRO/DX INJ NEW DRUG ADDON: CPT | Mod: 59

## 2017-06-17 PROCEDURE — 81001 URINALYSIS AUTO W/SCOPE: CPT | Performed by: EMERGENCY MEDICINE

## 2017-06-17 PROCEDURE — 82550 ASSAY OF CK (CPK): CPT | Performed by: EMERGENCY MEDICINE

## 2017-06-17 PROCEDURE — 25000125 ZZHC RX 250: Performed by: EMERGENCY MEDICINE

## 2017-06-17 PROCEDURE — 93005 ELECTROCARDIOGRAM TRACING: CPT

## 2017-06-17 PROCEDURE — 93010 ELECTROCARDIOGRAM REPORT: CPT | Performed by: EMERGENCY MEDICINE

## 2017-06-17 PROCEDURE — 87086 URINE CULTURE/COLONY COUNT: CPT | Performed by: EMERGENCY MEDICINE

## 2017-06-17 PROCEDURE — 71260 CT THORAX DX C+: CPT

## 2017-06-17 PROCEDURE — 93971 EXTREMITY STUDY: CPT | Mod: RT

## 2017-06-17 RX ORDER — CEFTRIAXONE 1 G/1
1 INJECTION, POWDER, FOR SOLUTION INTRAMUSCULAR; INTRAVENOUS ONCE
Status: COMPLETED | OUTPATIENT
Start: 2017-06-17 | End: 2017-06-17

## 2017-06-17 RX ORDER — PREDNISONE 20 MG/1
40 TABLET ORAL DAILY
Qty: 10 TABLET | Refills: 0 | Status: SHIPPED | OUTPATIENT
Start: 2017-06-17 | End: 2017-06-22

## 2017-06-17 RX ORDER — IOPAMIDOL 755 MG/ML
75 INJECTION, SOLUTION INTRAVASCULAR ONCE
Status: COMPLETED | OUTPATIENT
Start: 2017-06-17 | End: 2017-06-17

## 2017-06-17 RX ORDER — LORAZEPAM 1 MG/1
1 TABLET ORAL EVERY 6 HOURS PRN
Qty: 10 TABLET | Refills: 0 | Status: SHIPPED | OUTPATIENT
Start: 2017-06-17 | End: 2018-04-27

## 2017-06-17 RX ORDER — CIPROFLOXACIN 500 MG/1
500 TABLET, FILM COATED ORAL 2 TIMES DAILY
Qty: 14 TABLET | Refills: 0 | Status: SHIPPED | OUTPATIENT
Start: 2017-06-17 | End: 2017-06-24

## 2017-06-17 RX ORDER — KETOROLAC TROMETHAMINE 30 MG/ML
15 INJECTION, SOLUTION INTRAMUSCULAR; INTRAVENOUS ONCE
Status: COMPLETED | OUTPATIENT
Start: 2017-06-17 | End: 2017-06-17

## 2017-06-17 RX ADMIN — SODIUM CHLORIDE 500 ML: 9 INJECTION, SOLUTION INTRAVENOUS at 08:20

## 2017-06-17 RX ADMIN — IOPAMIDOL 75 ML: 755 INJECTION, SOLUTION INTRAVENOUS at 08:07

## 2017-06-17 RX ADMIN — CEFTRIAXONE SODIUM 1 G: 1 INJECTION, POWDER, FOR SOLUTION INTRAMUSCULAR; INTRAVENOUS at 11:27

## 2017-06-17 RX ADMIN — KETOROLAC TROMETHAMINE 15 MG: 30 INJECTION, SOLUTION INTRAMUSCULAR at 08:22

## 2017-06-17 RX ADMIN — SODIUM CHLORIDE 100 ML: 9 INJECTION, SOLUTION INTRAVENOUS at 08:07

## 2017-06-17 NOTE — ED AVS SNAPSHOT
[facility] Emergency Department    [address] [facility] [place] [address]    Phone:  [phone]    Fax:  [phone]                                       Anna Dahl   MRN: [id number]    Department:  [facility] Emergency Department   Date of Visit:  6/17/2017           Patient Information     Date Of Birth          1950        Your diagnoses for this visit were:     Chest wall pain     Acute cystitis without hematuria        You were seen by Kranthi Reza MD.      Follow-up Information     Follow up with Nadia Ramirez MD.    Specialty:  Family Practice    Contact information:    [facility]  [address]  [facility] [address]  [phone]          Discharge Instructions         Chest Wall Pain: Costochondritis    The chest pain that you have had today is caused by costochondritis. This condition is caused by an inflammation of the cartilage joining your ribs to your breastbone. It is not caused by heart or lung problems. Your healthcare team has made sure that the chest pain you feel is not from a life threatening cause of chest pain such as heart attack, collapsed lung, blood clot in the lung, tear in the aorta, or esophageal rupture. The inflammation may have been brought on by a blow to the chest, lifting heavy objects, intense exercise, or an illness that made you cough and sneeze a lot. It often occurs during times of emotional stress. It can be painful, but it is not dangerous. It usually goes away in 1 to 2 weeks. But it may happen again. Rarely, a more serious condition may cause symptoms similar to costochondritis. That s why it s important to watch for the warning signs listed below.  Home care  Follow these guidelines when caring for yourself at home:    If you feel that emotional stress is a cause of your condition, try to figure out the sources of that stress. It may not be obvious. Learn ways to deal with the stress in your life. This can include  regular exercise, muscle relaxation, meditation, or simply taking time out for yourself.    You may use acetaminophen, ibuprofen, or naproxen to control pain, unless another pain medicine was prescribed. If you have liver or kidney disease or ever had a stomach ulcer, talk with your healthcare provider before using these medicines.    You can also help ease pain by using a hot, wet compress or heating pad. Use this with or without a medicated skin cream that helps relieves pain.    Do stretching exercise as advised by your provider.    Take any prescribed medicines as directed.  Follow-up care  Follow up with your healthcare provider, or as advised, if you do not start to get better in the next 2 days.  When to seek medical advice  Call your healthcare provider right away if any of these occur:    A change in the type of pain. Call if it feels different, becomes more serious, lasts longer, or spreads into your shoulder, arm, neck, jaw, or back.    Shortness of breath or pain gets worse when you breathe    Weakness, dizziness, or fainting    Cough with dark-colored sputum (phlegm) or blood    Abdominal pain    Dark red or black stools    Fever of 100.4 F (38 C) or higher, or as directed by your healthcare provider  Date Last Reviewed: 12/1/2016 2000-2017 The [employer]. [address] [place] [address], [place] [address]. All rights reserved. This information is not intended as a substitute for professional medical care. Always follow your healthcare professional's instructions.          Understanding Urinary Tract Infections (UTIs)  Most UTIs are caused by bacteria, although they may also be caused by viruses or fungi. Bacteria from the bowel are the most common source of infection. The infection may start because of any of the following:    Sexual activity. During sex, bacteria can travel from the penis, vagina, or rectum into the urethra.     Bacteria on the skin outside the rectum may travel into the urethra.  This is more common in women since the rectum and urethra are closer to each other than in men. Wiping from front to back after using the toilet and keeping the area clean can help prevent germs from getting to the urethra.    Blockage of urine flow through the urinary tract. If urine sits too long, germs may start to grow out of control.      Parts of the urinary tract  The infection can occur in any part of the urinary tract.    The kidneys collect and store urine.    The ureters carry urine from the kidneys to the bladder.    The bladder holds urine until you are ready to let it out.    The urethra carries urine from the bladder out of the body. It is shorter in women, so bacteria can move through it more easily. The urethra is longer in men, so a UTI is less likely to reach the bladder or kidneys in men.  Date Last Reviewed: 1/1/2017 2000-2017 [employer]. [address] [place] [address]. All rights reserved. This information is not intended as a substitute for professional medical care. Always follow your healthcare professional's instructions.      Start ciprofloxacin tomorrow, discontinue trimethoprim sulfamethoxazole, drink plenty of fluids, take his little oxycodone is possible, treat pain with ibuprofen and Tylenol, try lorazepam for muscle spasms.  Follow-up with primary care.  Return here for progressive pain, shortness of air, fever greater than 100.4, vomiting, faintness or any other concern.    24 Hour Appointment Hotline       To make an appointment at any [facility], call [phone] ([phone]). If you don't have a family doctor or clinic, we will help you find one. [facility] are conveniently located to serve the needs of you and your family.             Review of your medicines      START taking        Dose / Directions Last dose taken    ciprofloxacin 500 MG tablet   Commonly known as:  CIPRO   Dose:  500 mg   Quantity:  14 tablet        Take 1  tablet (500 mg) by mouth 2 times daily for 7 days   Refills:  0        predniSONE 20 MG tablet   Commonly known as:  DELTASONE   Dose:  40 mg   Quantity:  10 tablet        Take 2 tablets (40 mg) by mouth daily for 5 days   Refills:  0          Our records show that you are taking the medicines listed below. If these are incorrect, please call your family doctor or clinic.        Dose / Directions Last dose taken    acetaminophen 325 MG tablet   Commonly known as:  TYLENOL   Dose:  650 mg   Quantity:  250 tablet        Take 2 tablets by mouth every 4 hours as needed.   Refills:  0        albuterol 108 (90 BASE) MCG/ACT Inhaler   Commonly known as:  PROAIR HFA/PROVENTIL HFA/VENTOLIN HFA   Dose:  2 puff   Quantity:  1 Inhaler        Inhale 2 puffs into the lungs every 4 hours as needed for shortness of breath / dyspnea   Refills:  2        alendronate 70 MG tablet   Commonly known as:  FOSAMAX   Dose:  70 mg   Quantity:  13 tablet        Take 1 tablet (70 mg) by mouth every 7 days Take with over 8 ounces water and stay upright for at least 30 minutes after dose.  Take at least 60 minutes before breakfast.   Refills:  3        aspirin 325 MG EC tablet   Dose:  325 mg   Quantity:  40 tablet        Take 1 tablet (325 mg) by mouth daily   Refills:  0        atorvastatin 40 MG tablet   Commonly known as:  LIPITOR   Dose:  40 mg   Quantity:  90 tablet        Take 1 tablet (40 mg) by mouth daily   Refills:  3        cyclobenzaprine 5 MG tablet   Commonly known as:  FLEXERIL   Dose:  5 mg   Quantity:  21 tablet        Take 1 tablet (5 mg) by mouth 3 times daily as needed for muscle spasms   Refills:  0        ferrous fumarate 65 mg ([place]. FE)-Vitamin C 125 mg  MG Tabs tablet   Commonly known as:  VITRON C   Dose:  1 tablet   Quantity:  30 tablet        Take 1 tablet by mouth 2 times daily   Refills:  1        fluticasone 110 MCG/ACT Inhaler   Commonly known as:  FLOVENT HFA   Dose:  2 puff   Quantity:  3 Inhaler         Inhale 2 puffs into the lungs 2 times daily   Refills:  3        fluticasone 50 MCG/ACT spray   Commonly known as:  FLONASE   Dose:  2 spray   Quantity:  16 g        Spray 2 sprays into both nostrils daily   Refills:  prn        hydrOXYzine 25 MG tablet   Commonly known as:  ATARAX   Dose:  25-50 mg   Quantity:  30 tablet        Take 1-2 tablets (25-50 mg) by mouth every 4 hours as needed for itching   Refills:  1        lisinopril 5 MG tablet   Commonly known as:  PRINIVIL/ZESTRIL   Dose:  5 mg   Quantity:  90 tablet        Take 1 tablet (5 mg) by mouth daily   Refills:  3        metoprolol 25 MG 24 hr tablet   Commonly known as:  TOPROL XL   Dose:  25 mg   Quantity:  90 tablet        Take 1 tablet (25 mg) by mouth daily   Refills:  3        montelukast 10 MG tablet   Commonly known as:  SINGULAIR   Dose:  10 mg   Quantity:  90 tablet        Take 1 tablet (10 mg) by mouth At Bedtime   Refills:  3        nitroglycerin 0.4 MG sublingual tablet   Commonly known as:  NITROSTAT   Dose:  0.4 mg   Quantity:  25 tablet        Place 1 tablet (0.4 mg) under the tongue every 5 minutes as needed for chest pain   Refills:  3        omeprazole 40 MG capsule   Commonly known as:  priLOSEC   Dose:  40 mg   Quantity:  180 capsule        Take 1 capsule (40 mg) by mouth every 12 hours   Refills:  3        * order for DME   Quantity:  1 Device        Equipment being ordered: immobilizer   Refills:  0        * order for DME   Quantity:  1 Units        Equipment being ordered: Walker Wheels () Treatment Diagnosis: s/p TKA   Refills:  0        oxyCODONE 5 MG IR tablet   Commonly known as:  ROXICODONE   Dose:  5-10 mg   Quantity:  60 tablet        Take 1-2 tablets (5-10 mg) by mouth every 3 hours as needed for moderate to severe pain   Refills:  0        phenazopyridine 97.5 MG tablet   Commonly known as:  AZO   Dose:  195 mg   Quantity:  24 tablet        Take 2 tablets (195 mg) by mouth 3 times daily as needed for urinary tract  discomfort (or bladder spasms; this drug will turn urine orange)   Refills:  1        senna-docusate 8.6-50 MG per tablet   Commonly known as:  SENOKOT-S;PERICOLACE   Dose:  1-2 tablet   Quantity:  100 tablet        Take 1-2 tablets by mouth 2 times daily   Refills:  1        sertraline 25 MG tablet   Commonly known as:  ZOLOFT   Dose:  25 mg   Quantity:  90 tablet        Take 1 tablet (25 mg) by mouth daily   Refills:  3        sucralfate 1 GM tablet   Commonly known as:  CARAFATE   Dose:  1 g   Quantity:  360 tablet        Take 1 tablet (1 g) by mouth 4 times daily May dissolve in 2 tsp water.   Refills:  3        sulfamethoxazole-trimethoprim 800-160 MG per tablet   Commonly known as:  BACTRIM DS/SEPTRA DS   Dose:  1 tablet   Quantity:  14 tablet        Take 1 tablet by mouth 2 times daily   Refills:  0        * Notice:  This list has 2 medication(s) that are the same as other medications prescribed for you. Read the directions carefully, and ask your doctor or other care provider to review them with you.      ASK your doctor about these medications        Dose / Directions Last dose taken    * LORazepam 1 MG tablet   Commonly known as:  ATIVAN   Dose:  0.5-1 mg   What changed:  Another medication with the same name was added. Make sure you understand how and when to take each.   Quantity:  10 tablet   Ask about: Which instructions should I use?        Take 0.5-1 tablets (0.5-1 mg) by mouth every 8 hours as needed for anxiety Take the night before a big event; may repeat in the AM if needed.   Refills:  1        * LORazepam 1 MG tablet   Commonly known as:  ATIVAN   Dose:  1 mg   What changed:  You were already taking a medication with the same name, and this prescription was added. Make sure you understand how and when to take each.   Quantity:  10 tablet   Ask about: Which instructions should I use?        Take 1 tablet (1 mg) by mouth every 6 hours as needed for muscle spasms   Refills:  0        * Notice:   This list has 2 medication(s) that are the same as other medications prescribed for you. Read the directions carefully, and ask your doctor or other care provider to review them with you.            Prescriptions were sent or printed at these locations (3 Prescriptions)                   Other Prescriptions                Printed at Department/Unit printer (3 of 3)         LORazepam (ATIVAN) 1 MG tablet               predniSONE (DELTASONE) 20 MG tablet               ciprofloxacin (CIPRO) 500 MG tablet                Procedures and tests performed during your visit     CBC with platelets differential    CK total    Chest CT - IV contrast only - PE protocol    Comprehensive metabolic panel    D dimer quantitative    EKG 12 lead    EKG 12-lead, tracing only    Peripheral IV catheter    Troponin I    UA reflex to Microscopic    US Lower Extremity Venous Duplex Right    Urine Culture Aerobic Bacterial      Orders Needing Specimen Collection     None      Pending Results     Date and Time Order Name Status Description    6/17/2017 1051 Urine Culture Aerobic Bacterial In process     6/17/2017 0742 Chest CT - IV contrast only - PE protocol Preliminary             Pending Culture Results     Date and Time Order Name Status Description    6/17/2017 1051 Urine Culture Aerobic Bacterial In process             Pending Results Instructions     If you had any lab results that were not finalized at the time of your Discharge, you can call the ED Lab Result RN at [phone]. You will be contacted by this team for any positive Lab results or changes in treatment. The nurses are available 7 days a week from 10A to 6:30P.  You can leave a message 24 hours per day and they will return your call.        Test Results From Your Hospital Stay        6/17/2017  7:13 AM      Component Results     Component Value Ref Range & Units Status    WBC 7.9 4.0 - 11.0 10e9/L Final    RBC Count 3.96 3.8 - 5.2 10e12/L Final    Hemoglobin 11.2 (L) 11.7  - 15.7 g/dL Final    Hematocrit 36.4 35.0 - 47.0 % Final    MCV 92 78 - 100 fl Final    MCH 28.3 26.5 - 33.0 pg Final    MCHC 30.8 (L) 31.5 - 36.5 g/dL Final    RDW 13.5 10.0 - 15.0 % Final    Platelet Count 542 (H) 150 - 450 10e9/L Final    Diff Method Automated Method  Final    % Neutrophils 64.2 % Final    % Lymphocytes 20.2 % Final    % Monocytes 8.6 % Final    % Eosinophils 6.1 % Final    % Basophils 0.5 % Final    % Immature Granulocytes 0.4 % Final    Absolute Neutrophil 5.1 1.6 - 8.3 10e9/L Final    Absolute Lymphocytes 1.6 0.8 - 5.3 10e9/L Final    Absolute Monocytes 0.7 0.0 - 1.3 10e9/L Final    Absolute Eosinophils 0.5 0.0 - 0.7 10e9/L Final    Absolute Basophils 0.0 0.0 - 0.2 10e9/L Final    Abs Immature Granulocytes 0.0 0 - 0.4 10e9/L Final         6/17/2017  7:31 AM      Component Results     Component Value Ref Range & Units Status    Sodium 137 133 - 144 mmol/L Final    Potassium 4.3 3.4 - 5.3 mmol/L Final    Chloride 104 94 - 109 mmol/L Final    Carbon Dioxide 26 20 - 32 mmol/L Final    Anion Gap 7 3 - 14 mmol/L Final    Glucose 108 (H) 70 - 99 mg/dL Final    Urea Nitrogen 14 7 - 30 mg/dL Final    Creatinine 1.09 (H) 0.52 - 1.04 mg/dL Final    GFR Estimate 50 (L) >60 mL/min/1.7m2 Final    Non  GFR Calc    GFR Estimate If Black 60 (L) >60 mL/min/1.7m2 Final    African American GFR Calc    Calcium 9.2 8.5 - 10.1 mg/dL Final    Bilirubin Total 0.3 0.2 - 1.3 mg/dL Final    Albumin 3.6 3.4 - 5.0 g/dL Final    Protein Total 7.9 6.8 - 8.8 g/dL Final    Alkaline Phosphatase 113 40 - 150 U/L Final    ALT 25 0 - 50 U/L Final    AST 19 0 - 45 U/L Final         6/17/2017  7:31 AM      Component Results     Component Value Ref Range & Units Status    Troponin I ES  0.000 - 0.045 ug/L Final    <0.015  The 99th percentile for upper reference range is 0.045 ug/L.  Troponin values in   the range of 0.045 - 0.120 ug/L may be associated with risks of adverse   clinical events.           6/17/2017  7:45  AM      Component Results     Component Value Ref Range & Units Status    CK Total 69 30 - 225 U/L Final         6/17/2017  8:04 AM      Component Results     Component Value Ref Range & Units Status    D Dimer 6.3 (H) 0.0 - 0.50 ug/ml FEU Final    This D-dimer assay is intended for use in conjuntion with a clinical pretest   probability assessment model to exclude pulmonary embolism (PE) and as an aid   in the diagnosis of deep venous thrombosis (DVT) in outpatients suspected of   PE   or DVT. The cut-off value is 0.5 g/mL FEU.           6/17/2017  8:40 AM      Narrative     CT CHEST PULMONARY EMBOLISM WITH CONTRAST June 17, 2017 8:08 AM     HISTORY: Chest pain and hypoxia two weeks status post total knee  replacement.    TECHNIQUE: Helical axial scans from lung apices through lung bases  with 75 mL Isovue-370 IV contrast. Radiation dose for this scan was  reduced using automated exposure control, adjustment of the mA and/or  kV according to patient size, or iterative reconstruction technique.    COMPARISON: 4/12/2013.    FINDINGS: There is no CT evidence for pulmonary embolism or other  acute vascular abnormality of the chest. Mild ectasia ascending  thoracic aorta without change (4.1 cm diameter). A small pericardial  effusion is also again noted, not significantly changed. Multiple  coronary artery stents are present. Minimal hiatal hernia is again  noted. Mediastinal and hilar structures are otherwise unremarkable.    There is a 0.5 cm nodule in the left lower lobe laterally which is  unchanged. A 2 mm peripheral nodule in the left upper lobe superior  segment posterolaterally is also unchanged. Acute very small foci of  linear scarring are noted in the right lung. No new significant  nodules.        Impression     IMPRESSION:  1. No CT evidence for pulmonary embolism or other acute vascular  abnormality.  2. Mild ectasia ascending thoracic aorta is stable.  3. Small pericardial effusion without change.  4. Two  left-sided pulmonary nodules have been stable for over four  years and are considered benign.           6/17/2017 10:25 AM      Component Results     Component Value Ref Range & Units Status    Color Urine Yellow  Final    Appearance Urine Clear  Final    Glucose Urine Negative NEG mg/dL Final    Bilirubin Urine Negative NEG Final    Ketones Urine Negative NEG mg/dL Final    Specific Gravity Urine 1.033 1.003 - 1.035 Final    Blood Urine Trace (A) NEG Final    pH Urine 8.0 (H) 5.0 - 7.0 pH Final    Protein Albumin Urine Negative NEG mg/dL Final    Urobilinogen mg/dL Normal 0.0 - 2.0 mg/dL Final    Nitrite Urine Positive (A) NEG Final    Leukocyte Esterase Urine Large (A) NEG Final    Source Midstream Urine  Final    RBC Urine 44 (H) 0 - 2 /HPF Final    WBC Urine 70 (H) 0 - 2 /HPF Final    Bacteria Urine Moderate (A) NEG /HPF Final    Squamous Epithelial /HPF Urine <1 0 - 1 /HPF Final    Mucous Urine Present (A) NEG /LPF Final         6/17/2017 10:43 AM      Narrative     EXAMINATION: DOPPLER VENOUS ULTRASOUND OF THE RIGHT LOWER EXTREMITY,  6/17/2017 10:38 AM     COMPARISON: None.    HISTORY: Swelling, recent knee surgery.    TECHNIQUE:  Gray-scale evaluation with compression, spectral flow, and  color Doppler assessment of the deep venous system of the right leg  from groin to knee, and then at the ankle.    FINDINGS:  In the right lower extremity, the common femoral, femoral, popliteal  and posterior tibial veins demonstrate normal compressibility and  blood flow.          Impression     IMPRESSION:  1.  No evidence of right lower extremity deep venous thrombosis.    ANDI MEDINA         6/17/2017 10:59 AM                Thank you for choosing [facility]       Thank you for choosing [facility] for your care. Our goal is always to provide you with excellent care. Hearing back from our patients is one way we can continue to improve our services. Please take a few minutes to complete the written survey that you may  receive in the mail after you visit with us. Thank you!        [facility] Information     [employer] gives you secure access to your electronic health record. If you see a primary care provider, you can also send messages to your care team and make appointments. If you have questions, please call your primary care clinic.  If you do not have a primary care provider, please call [phone] and they will assist you.        Care EveryWhere ID     This is your Care EveryWhere ID. This could be used by other organizations to access your [facility] medical records  [id number]        After Visit Summary       This is your record. Keep this with you and show to your community pharmacist(s) and doctor(s) at your next visit.

## 2017-06-17 NOTE — ED NOTES
Pt having trouble with incontinence since she was diagnose with UTI.  Incontinent again.  Pt still having pain in chest.  States the CT increased her pain with having to raise her arms over her head.

## 2017-06-17 NOTE — ED NOTES
Pt has been having chest pain for the past few days.  Using walker, had knee surgery at the beginning of June.  Pt has been having PT.  Home care staff thought chest pain may have been from using walker.  Pain radiates from middle of chest up to both sides of neck.  Pt is anxious.  Pt has ongoing UTI.  Unable to sleep with UTI.  Chest wall is tender.  Pain is worse with deep breathing.  Pt denies any nausea.  Pt has swelling in R leg.  Feet having some numbness this morning, R foot worse than L.  Capillary refill about 3 seconds in both legs.

## 2017-06-17 NOTE — ED PROVIDER NOTES
History     Chief Complaint   Patient presents with     Chest Pain     Pt has been having chest pain for the past few days.  Using walker, had knee surgery at the beginnin of June.  Pain radiates from middle of chest up to both sides of neck.  Pt is anxious.  Pt has ongoing UTI.      HPI  Anna Dahl is a 66 year old female who presents with precordial chest pain described as sharp, worse with movement, began early this past week without inciting trauma or strain, currently recovering from right knee arthroplasty 6/1, .  Pain worse with movement of arms, currently radiating into, nausea or near syncope, pain worse with deep breathing, feels short of air when up and about.  History of coronary artery disease with 6 stents, most recent stent proximal leg 5 years ago.  Sleeping poorly secondary to urinary frequency.  Diagnosed with cystitis 3 days ago, culture grew Klebsiella, sensitive to trimethoprim sulfamethoxazole which she is currently taking.  Denies fever or cough.  She is currently taking 325 aspirin daily which she took this morning.  Taking oxycodone 1-2 tabs 3-4 times daily, most recent dose earlier this morning 1 tab.  Denies history of DVT/PE.  Persistent right knee and lower extremity pain, not progressively worse over the past few days.       Past Medical and Surgical History, and Social History in the Epic system.    Allergies:   Allergies   Allergen Reactions     Amoxicillin Anaphylaxis     Rosuvastatin Other (See Comments)     Severe headache, backache     Reglan [Metoclopramide Hcl] Visual Disturbance     Disoriented, hallucinations         Current Facility-Administered Medications on File Prior to Encounter:  lidocaine (PF) (XYLOCAINE) 1 % injection   lidocaine 2%-EPINEPHrine 1:200,000 injection   ropivacaine (NAROPIN) injection     Current Outpatient Prescriptions on File Prior to Encounter:  sulfamethoxazole-trimethoprim (BACTRIM DS/SEPTRA DS) 800-160 MG per tablet Take 1 tablet by mouth 2  times daily   phenazopyridine (AZO) 97.5 MG tablet Take 2 tablets (195 mg) by mouth 3 times daily as needed for urinary tract discomfort (or bladder spasms; this drug will turn urine orange)   lisinopril (PRINIVIL/ZESTRIL) 5 MG tablet Take 1 tablet (5 mg) by mouth daily   fluticasone (FLONASE) 50 MCG/ACT spray Spray 2 sprays into both nostrils daily   atorvastatin (LIPITOR) 40 MG tablet Take 1 tablet (40 mg) by mouth daily   metoprolol (TOPROL XL) 25 MG 24 hr tablet Take 1 tablet (25 mg) by mouth daily   montelukast (SINGULAIR) 10 MG tablet Take 1 tablet (10 mg) by mouth At Bedtime   fluticasone (FLOVENT HFA) 110 MCG/ACT Inhaler Inhale 2 puffs into the lungs 2 times daily   sucralfate (CARAFATE) 1 GM tablet Take 1 tablet (1 g) by mouth 4 times daily May dissolve in 2 tsp water.   omeprazole (PRILOSEC) 40 MG capsule Take 1 capsule (40 mg) by mouth every 12 hours   alendronate (FOSAMAX) 70 MG tablet Take 1 tablet (70 mg) by mouth every 7 days Take with over 8 ounces water and stay upright for at least 30 minutes after dose.  Take at least 60 minutes before breakfast.   sertraline (ZOLOFT) 25 MG tablet Take 1 tablet (25 mg) by mouth daily   albuterol (PROAIR HFA/PROVENTIL HFA/VENTOLIN HFA) 108 (90 BASE) MCG/ACT Inhaler Inhale 2 puffs into the lungs every 4 hours as needed for shortness of breath / dyspnea   cyclobenzaprine (FLEXERIL) 5 MG tablet Take 1 tablet (5 mg) by mouth 3 times daily as needed for muscle spasms   hydrOXYzine (ATARAX) 25 MG tablet Take 1-2 tablets (25-50 mg) by mouth every 4 hours as needed for itching   oxyCODONE (ROXICODONE) 5 MG IR tablet Take 1-2 tablets (5-10 mg) by mouth every 3 hours as needed for moderate to severe pain   ferrous fumarate 65 mg, Ute. FE,-Vitamin C 125 mg (VITRON C)  MG TABS tablet Take 1 tablet by mouth 2 times daily   aspirin  MG EC tablet Take 1 tablet (325 mg) by mouth daily   senna-docusate (SENOKOT-S;PERICOLACE) 8.6-50 MG per tablet Take 1-2 tablets by  mouth 2 times daily   order for DME Equipment being ordered: Walker Wheels ()Treatment Diagnosis: s/p TKA   order for DME Equipment being ordered: immobilizer   LORazepam (ATIVAN) 1 MG tablet Take 0.5-1 tablets (0.5-1 mg) by mouth every 8 hours as needed for anxiety Take the night before a big event; may repeat in the AM if needed.   nitroglycerin (NITROSTAT) 0.4 MG SL tablet Place 1 tablet (0.4 mg) under the tongue every 5 minutes as needed for chest pain   acetaminophen (TYLENOL) 325 MG tablet Take 2 tablets by mouth every 4 hours as needed.       Patient Active Problem List   Diagnosis     Nasal polyp     Hiatal hernia     Colon polyps     GERD (gastroesophageal reflux disease)     Hyperlipidemia LDL goal <100     Advanced directives, counseling/discussion     Mild intermittent asthma with exacerbation     Chronic sinusitis     Non-ST elevation myocardial infarction (NSTEMI), initial care episode (H)     CAD (coronary artery disease)     BCC (basal cell carcinoma), face     Anxiety     Osteoporosis     S/P total knee arthroplasty     Elevated glucose     Acute kidney failure (H)     Postoperative anemia       Past Surgical History:   Procedure Laterality Date     ARTHROPLASTY KNEE Right 6/1/2017    Procedure: ARTHROPLASTY KNEE;  Right total knee arthroplasty;  Surgeon: Colin Krause MD;  Location: [facility]     ENT SURGERY       ESOPHAGOSCOPY, GASTROSCOPY, DUODENOSCOPY (EGD), COMBINED  2/6/2013    Procedure: COMBINED ESOPHAGOSCOPY, GASTROSCOPY, DUODENOSCOPY (EGD), BIOPSY SINGLE OR MULTIPLE;  Gastroscopy;  Surgeon: Yves Mills MD;  Location: [facility]     ETHMOIDECTOMY  1/5/2011    ETHMOIDECTOMY performed by ADDY SERRANO at [facility]     MOHS MICROGRAPHIC PROCEDURE       MOHS MICROGRAPHIC PROCEDURE       SURGICAL HISTORY OF -   6/2003    Bilateral total ethmoidecomy with bilateral maxillary antrostomies with removal of polyps, bilateral frontal sinusotomies, and left sphenoidotomy      "SURGICAL HISTORY OF -   1985    Bilateral intranasal polypectomy with bilateraly nasal antral punctures      SURGICAL HISTORY OF -   2004    Mohs micrographic surgery, fresh tissue technique with complex wound repair. below right ear at angle of jawline.     TUBAL LIGATION      tubal ligation       Social History   Substance Use Topics     Smoking status: Never Smoker     Smokeless tobacco: Never Used     Alcohol use Yes      Comment: moderate couple drinks on weekends       Most Recent Immunizations   Administered Date(s) Administered     Influenza (High Dose) 3 valent vaccine 11/09/2016     Influenza (IIV3) 10/31/2012     Influenza Vaccine IM 3yrs+ 4 Valent IIV4 10/31/2013     Pneumococcal (PCV 13) 12/15/2014     Pneumococcal 23 valent 07/07/2016     TDAP Vaccine (Adacel) 08/31/2010       BMI: Estimated body mass index is 40.53 kg/(m^2) as calculated from the following:    Height as of 6/1/17: 1.543 m (5' 0.75\").    Weight as of 6/1/17: 96.5 kg (212 lb 11.9 oz).      Review of Systems    Physical Exam   BP: 138/87  Heart Rate: 81  Temp: 97.8  F (36.6  C)  Resp: 24  SpO2: 98 %  Physical Exam   Nontoxic appearing no respiratory distress alert and oriented ×3  Head atraumatic normocephalic  Conjunctiva noninjected, oropharynx moist without lesions or erythema  No cervical adenopathy neck supple full active painless range of motion  Lungs clear to auscultation  Moderate anterior chest wall tenderness reproduces pain  Heart regular no murmur  Abdomen soft nontender bowel sounds positive no masses or HSM  Strength and sensation grossly intact throughout the extremities, gait antalgic secondary to right total knee arthroplasty  Speech is fluent, good eye contact, thought processes are rational  Right lower extremity moderate diffuse edema without tenderness along the medial thigh, mild tenderness the popliteal fossa, incision intact, no induration warmth or fluctuance or discharge, peripheral pulses intact in lower " extremities      ED Course     ED Course     Procedures         EKG time 701, symptoms chest pain, normal sinus rhythm rate 85, axes and intervals within normal limits, no acute ST or T-wave changes, there is just a hint of ST depression laterally, this is not changed from 7/28, repeat EKG at 910, again chest wall pain, very subtle ST depression laterally, no acute changes, both read by Dr. Kranthi Reza    Critical Care time:  none               Labs Ordered and Resulted from Time of ED Arrival Up to the Time of Departure from the ED   CBC WITH PLATELETS DIFFERENTIAL - Abnormal; Notable for the following:        Result Value    Hemoglobin 11.2 (*)     MCHC 30.8 (*)     Platelet Count 542 (*)     All other components within normal limits   COMPREHENSIVE METABOLIC PANEL - Abnormal; Notable for the following:     Glucose 108 (*)     Creatinine 1.09 (*)     GFR Estimate 50 (*)     GFR Estimate If Black 60 (*)     All other components within normal limits   D DIMER QUANTITATIVE - Abnormal; Notable for the following:     D Dimer 6.3 (*)     All other components within normal limits   URINE MACROSCOPIC WITH REFLEX TO MICRO - Abnormal; Notable for the following:     Blood Urine Trace (*)     pH Urine 8.0 (*)     Nitrite Urine Positive (*)     Leukocyte Esterase Urine Large (*)     RBC Urine 44 (*)     WBC Urine 70 (*)     Bacteria Urine Moderate (*)     Mucous Urine Present (*)     All other components within normal limits   TROPONIN I   CK TOTAL   PERIPHERAL IV CATHETER   URINE CULTURE AEROBIC BACTERIAL       Assessments & Plan (with Medical Decision Making)  66-year-old female 2-1/2 weeks post right total knee arthroplasty presents with chest pain details per HPI.  ECG no acute ischemic changes, troponin normal, symptom complex is not consistent with ACS, moderate/severe chest wall tenderness, question secondary to use of walker and context of right knee surgery.  Elevated d-dimer 6.3, CT scan chest PE protocol no acute  finding.  Right lower extremity ultrasound or evidence for DVT.  Urinalysis continues to be consistent with infection, recent culture grew klebsiella pansensitive currently being treated with trimethoprim sulfamethoxazole.  Repeat urine culture.  1 g Rocephin IV here.  Switch antibiotics to ciprofloxacin.  Recommend avoiding oxycodone, prescription for prednisone short course 5 days with respect to chest wall pain, short duration prescription for Ativan for chest wall pain as well.  Recommend follow up with primary care in the next week.  Return here for shortness of air, worsening chest pain, faintness, fever greater than 100.4, abdominal pain, vomiting or any other concern.  Working diagnosis, results of studies and plan reviewed with patient and spouse expressed understanding and agreement.       I have reviewed the nursing notes.    I have reviewed the findings, diagnosis, plan and need for follow up with the patient.       New Prescriptions    CIPROFLOXACIN (CIPRO) 500 MG TABLET    Take 1 tablet (500 mg) by mouth 2 times daily for 7 days    LORAZEPAM (ATIVAN) 1 MG TABLET    Take 1 tablet (1 mg) by mouth every 6 hours as needed for muscle spasms    PREDNISONE (DELTASONE) 20 MG TABLET    Take 2 tablets (40 mg) by mouth daily for 5 days       Final diagnoses:   Chest wall pain   Acute cystitis without hematuria       6/17/2017   [facility] EMERGENCY DEPARTMENT     Kranthi Reza MD  06/17/17 [id number]

## 2017-06-17 NOTE — ED NOTES
Pt relaxing a little bit more now.  Pain is decreasing a little.  Continues to have tenderness in chest, increased pain when she moves arms in neck and chest.

## 2017-06-17 NOTE — DISCHARGE INSTRUCTIONS
Chest Wall Pain: Costochondritis    The chest pain that you have had today is caused by costochondritis. This condition is caused by an inflammation of the cartilage joining your ribs to your breastbone. It is not caused by heart or lung problems. Your healthcare team has made sure that the chest pain you feel is not from a life threatening cause of chest pain such as heart attack, collapsed lung, blood clot in the lung, tear in the aorta, or esophageal rupture. The inflammation may have been brought on by a blow to the chest, lifting heavy objects, intense exercise, or an illness that made you cough and sneeze a lot. It often occurs during times of emotional stress. It can be painful, but it is not dangerous. It usually goes away in 1 to 2 weeks. But it may happen again. Rarely, a more serious condition may cause symptoms similar to costochondritis. That s why it s important to watch for the warning signs listed below.  Home care  Follow these guidelines when caring for yourself at home:    If you feel that emotional stress is a cause of your condition, try to figure out the sources of that stress. It may not be obvious. Learn ways to deal with the stress in your life. This can include regular exercise, muscle relaxation, meditation, or simply taking time out for yourself.    You may use acetaminophen, ibuprofen, or naproxen to control pain, unless another pain medicine was prescribed. If you have liver or kidney disease or ever had a stomach ulcer, talk with your healthcare provider before using these medicines.    You can also help ease pain by using a hot, wet compress or heating pad. Use this with or without a medicated skin cream that helps relieves pain.    Do stretching exercise as advised by your provider.    Take any prescribed medicines as directed.  Follow-up care  Follow up with your healthcare provider, or as advised, if you do not start to get better in the next 2 days.  When to seek medical  advice  Call your healthcare provider right away if any of these occur:    A change in the type of pain. Call if it feels different, becomes more serious, lasts longer, or spreads into your shoulder, arm, neck, jaw, or back.    Shortness of breath or pain gets worse when you breathe    Weakness, dizziness, or fainting    Cough with dark-colored sputum (phlegm) or blood    Abdominal pain    Dark red or black stools    Fever of 100.4 F (38 C) or higher, or as directed by your healthcare provider  Date Last Reviewed: 12/1/2016 2000-2017 [employer]. [address] [place] [address]. All rights reserved. This information is not intended as a substitute for professional medical care. Always follow your healthcare professional's instructions.          Understanding Urinary Tract Infections (UTIs)  Most UTIs are caused by bacteria, although they may also be caused by viruses or fungi. Bacteria from the bowel are the most common source of infection. The infection may start because of any of the following:    Sexual activity. During sex, bacteria can travel from the penis, vagina, or rectum into the urethra.     Bacteria on the skin outside the rectum may travel into the urethra. This is more common in women since the rectum and urethra are closer to each other than in men. Wiping from front to back after using the toilet and keeping the area clean can help prevent germs from getting to the urethra.    Blockage of urine flow through the urinary tract. If urine sits too long, germs may start to grow out of control.      Parts of the urinary tract  The infection can occur in any part of the urinary tract.    The kidneys collect and store urine.    The ureters carry urine from the kidneys to the bladder.    The bladder holds urine until you are ready to let it out.    The urethra carries urine from the bladder out of the body. It is shorter in women, so bacteria can move through it more easily. The  urethra is longer in men, so a UTI is less likely to reach the bladder or kidneys in men.  Date Last Reviewed: 1/1/2017 2000-2017 [employer] [address] [place] [address], [place] [address]. All rights reserved. This information is not intended as a substitute for professional medical care. Always follow your healthcare professional's instructions.      Start ciprofloxacin tomorrow, discontinue trimethoprim sulfamethoxazole, drink plenty of fluids, take his little oxycodone is possible, treat pain with ibuprofen and Tylenol, try lorazepam for muscle spasms.  Follow-up with primary care.  Return here for progressive pain, shortness of air, fever greater than 100.4, vomiting, faintness or any other concern.

## 2017-06-17 NOTE — ED AVS SNAPSHOT
[facility] Emergency Department    [address] [address]    Phone:  [phone]    Fax:  [phone]                                       Anna Dahl   MRN: [id number]    Department:  [facility] Emergency Department   Date of Visit:  6/17/2017           After Visit Summary Signature Page     I have received my discharge instructions, and my questions have been answered. I have discussed any challenges I see with this plan with the nurse or doctor.    ..........................................................................................................................................  Patient/Patient Representative Signature      ..........................................................................................................................................  Patient Representative Print Name and Relationship to Patient    ..................................................               ................................................  Date                                            Time    ..........................................................................................................................................  Reviewed by Signature/Title    ...................................................              ..............................................  Date                                                            Time

## 2017-06-17 NOTE — ED NOTES
Pt has dropped oxygen saturations a couple of times now while resting with eyes closed.  Appears to be in no distress with breathing.  Oxygen saturations above 90 when patient talking and awake.  Placed on oxygen.  Dr Reza notified.

## 2017-06-21 ENCOUNTER — CARE COORDINATION (OUTPATIENT)
Dept: CARE COORDINATION | Facility: CLINIC | Age: 67
End: 2017-06-21

## 2017-06-21 DIAGNOSIS — Z76.89 HEALTH CARE HOME: ICD-10-CM

## 2017-06-21 NOTE — PROGRESS NOTES
Clinic Care Coordination Contact  OUTREACH    Referral Information:  Referral Source: CTS  Reason for Contact: follow up outreach     Clinical Concerns:  Current Medical Concerns: Patient still reports chest wall pain same as when in the ED. Patient finds that a heating pad seems to help and taking muscle relaxers. Patient also reports urgency with urination and is up a few times in the night. Patient concerned that Bladder infection is not going away. Patient continues to take Cipro as directed. No change with urinary symptoms.   Other than her chest and urinary urgency no other concerns were reported.       Medication Management:  Patient is knowledgeable on medications and is adherent. No financial concerns at this time.              Plan: 1) Care Team to review and advise on bladder infection treatment  2) Patient will continue to follow treatment plan as directed and follow up with PCP with concerns ongoing.   3) Care Coordination to remain available for future needs. Follow up planned for next week    Ernesto Alvarez RN  Clinic Care Coordinator  [phone] or [phone]

## 2017-06-21 NOTE — LETTER
Health Care Home - Access Care Plan    About Me  Patient Name:  Anna Gonzalez    YOB: 1950  Age:                            66 year old   Patrick MRN:         [id number] Telephone Information:     Home Phone [phone]   Mobile [phone]       Address:    [address] [place] [address] Email address:  [email or web address]      Emergency Contact(s)  Name Relationship Lgl Grd Work Phone Home Phone Mobile Phone   1. FAVIAN GONZALEZ Spouse   [phone]   2. ISAIAS GONZALEZ Son   [phone]             Health Maintenance:      My Access Plan  Medical Emergency 911   Questions or concerns during clinic hours Primary Clinic Line, I will call the clinic directly: Primary Clinic: [facility]- [phone]   24 Hour Appointment Line [phone] or  [phone])  (toll free)   24 Hour Nurse Line [phone] (toll free)   Questions or concerns outside clinic hours 24 Hour Appointment Line, I will call the after-hours on-call line:   [facility] [phone] or [phone]) (toll-free)   Preferred Urgent Care Preferred Urgent Care: [facility], [phone]   Preferred Hospital Preferred Hospital: [place], [place]  [phone]   Preferred Pharmacy [facility] - [facility] [address]     Behavioral Health Crisis Line [facility], [phone] or 911     My Care Team Members  Patient Care Team       Relationship Specialty Notifications Start End    Nadia Ramirez MD PCP - General Family Practice  8/16/10     Phone: [phone] Fax: [phone]         [facility] [address] [facility] [address]        My Medical and Care Information  Problem List   Patient Active Problem List   Diagnosis     Nasal polyp     Hiatal hernia     Colon polyps     GERD (gastroesophageal reflux disease)     Hyperlipidemia  LDL goal <100     Advanced directives, counseling/discussion     Mild intermittent asthma with exacerbation     Chronic sinusitis     Non-ST elevation myocardial infarction (NSTEMI), initial care episode (H)     CAD (coronary artery disease)     BCC (basal cell carcinoma), face     Anxiety     Osteoporosis     S/P total knee arthroplasty     Elevated glucose     Acute kidney failure (H)     Postoperative anemia     Health Care Home      Current Medications and Allergies:  See printed Medication Report

## 2017-06-21 NOTE — LETTER
[facility]  [address]  [facility] [address]  Phone: [phone]    June 21, 2017      Anna Dahl  [address] [address]    Dear Anna,  I am the Clinic Care Coordinator that works with your primary care provider's clinic. I wanted to thank you for spending the time to talk with me.  Below is a description of what Clinic Care Coordination is and how I can further assist you.     The Clinic Care Coordinator role is a Registered Nurse and/or  who understands the health care system. The goal of Clinic Care Coordination is to help you manage your health and improve access to the [facility] in the most efficient manner.  The Registered Nurse can assist you in meeting your health care goals by providing education, coordinating services, and strengthening the communication among your providers. The  can assist you with financial, behavioral, psychosocial, and chemical dependency and counseling/psychiatric resources.    Please feel free to keep this letter and contact information to contact me at [phone], [phone] with any further questions or concerns that may arise. We at [facility] are focused on providing you with the highest-quality healthcare experience possible and that all starts with you.       Sincerely,     Ernesto Alvarez RN  Clinic Care Coordinator    Enclosed: I have enclosed a copy of a 24 Hour Access Plan. This has helpful phone numbers for you to call when needed. Please keep this in an easy to access place to use as needed.

## 2017-06-23 ENCOUNTER — TELEPHONE (OUTPATIENT)
Dept: FAMILY MEDICINE | Facility: CLINIC | Age: 67
End: 2017-06-23

## 2017-06-23 DIAGNOSIS — R35.0 URINARY FREQUENCY: Primary | ICD-10-CM

## 2017-06-23 PROCEDURE — 81001 URINALYSIS AUTO W/SCOPE: CPT | Performed by: FAMILY MEDICINE

## 2017-06-23 NOTE — TELEPHONE ENCOUNTER
Patient notified of MD's recommendations  Patient verbalized understanding and agreed with MD's plan  Called patient's HC RN to give verbal order [phone], left message to call clinic back    Nevin JENKINS Rn

## 2017-06-23 NOTE — TELEPHONE ENCOUNTER
Yes, okay to order another UA and UC (make sure they also do the culture, not just a reflex).     No change until we see the UA/UC results.  The UC from 6/17 shows that the bacteria is sensitive to the cipro, so it should be working.    Allyson Maxwell M.D.

## 2017-06-23 NOTE — TELEPHONE ENCOUNTER
Katelyn,    I will put in another future order for a UA/UC.  If she can get the specimen to lab today, we will have a culture report by Monday.  It is hard for me to think she has a persistent infection having been on two antibiotics to which her culture showed sensitivity, and the ED put her on a high dose of the Cipro.  If she were still having burning, I would suspect some vaginal atrophy or genitial inflammation, but if the burning is gone and she just has frequency and small voids, then it may be more of an irritable bladder issue.  I would probably then just see her for a pelvic exam to make sure there is no anatomic question and discuss irritable bladder meds with her, or if she would prefer, I could have her see the urologist.  Sometimes a cystoscopy is needed if meds for overactive bladder are not effective.    Of course, if the culture shows some new infection, we would treat that appropriately.    Over the weekend, have her drink more fluids, water, juice, tea, lemonade, other beverages, and keep her urine dilute. Often concentrated urine is irritating and people cut back on fluids so they won't go as often, but  If they drink more and dilute the urine, it is less irritating to the bladder walls, which will then not contract so often, so the bladder can hold more and the patient goes less often.    Nadia Ramirez md

## 2017-06-23 NOTE — TELEPHONE ENCOUNTER
S-(situation): Patient has been treated for UTI for weeks,    B-(background): Patient was started on Bactrim for UTI, was seen in ED on 6/18 was started on Cipro 500 mg BIDx 7 days    A-(assessment): Patient is still having urinary frequency, voiding in very small amounts, has some incontinence  with voiding, denies burning, no temp.  SN making a visit today.  Patient is wondering what to do with today being Friday.    R-(recommendations): Please advise.

## 2017-06-23 NOTE — TELEPHONE ENCOUNTER
Reason for Call:  Other on going UTI    Detailed comments: pt  is calling because the pt has been seen twice for a UTI that is not clearing   The pt is still having frequency, discomfort and incontinences  The pt has been on    ciprofloxacin (CIPRO) 500 MG tablet and sulfamethoxazole-trimethoprim (BACTRIM DS/SEPTRA DS) 800-160 MG per tablet  And the  wants to know what to do     Phone Number Patient can be reached at: Home number on file [phone] (home)    Best Time: any     Can we leave a detailed message on this number? YES    Call taken on 6/23/2017 at 9:38 AM by Baylee Orantes

## 2017-06-23 NOTE — TELEPHONE ENCOUNTER
Patient reports:  She still has urgency and frequency with urination  She can hardly make it to the bathroom  When she urinates, hardly anything comes out  Urgency is worse at night  Patient has been on Cipro since 6/17/17, prescribed after Bactrim wasn't working for her  Patient denies burning, fever, back or abd pain  Patient's home care RN is coming out this afternoon, patient wants to know if she should do another Urine test or change her medication, she has a call in to her home care RN already, so they may be calling for a verbal order  Please advise  Pharm ready    Routing to provider of the day, PCP is out of office today    Nevin JENKINS Rn

## 2017-06-24 LAB
ALBUMIN UR-MCNC: NEGATIVE MG/DL
APPEARANCE UR: CLEAR
BACTERIA #/AREA URNS HPF: ABNORMAL /HPF
BILIRUB UR QL STRIP: NEGATIVE
COLOR UR AUTO: YELLOW
GLUCOSE UR STRIP-MCNC: NEGATIVE MG/DL
HGB UR QL STRIP: NEGATIVE
KETONES UR STRIP-MCNC: NEGATIVE MG/DL
LEUKOCYTE ESTERASE UR QL STRIP: ABNORMAL
MUCOUS THREADS #/AREA URNS LPF: PRESENT /LPF
NITRATE UR QL: NEGATIVE
PH UR STRIP: 5 PH (ref 5–7)
RBC #/AREA URNS AUTO: 1 /HPF (ref 0–2)
SP GR UR STRIP: 1.01 (ref 1–1.03)
SQUAMOUS #/AREA URNS AUTO: 1 /HPF (ref 0–1)
URN SPEC COLLECT METH UR: ABNORMAL
UROBILINOGEN UR STRIP-MCNC: NORMAL MG/DL (ref 0–2)
WBC #/AREA URNS AUTO: <1 /HPF (ref 0–2)

## 2017-06-26 ENCOUNTER — TELEPHONE (OUTPATIENT)
Dept: FAMILY MEDICINE | Facility: CLINIC | Age: 67
End: 2017-06-26

## 2017-06-26 NOTE — TELEPHONE ENCOUNTER
Reason for Call:  Other call back    Detailed comments: patients  is calling wondering what the results are from his wifes Urine that they dropped off on Friday. She is very miserable.    Phone Number Patient can be reached at: Home number on file [phone] (home)    Best Time: any    Can we leave a detailed message on this number? NO please talk to the patients .  I see that the UC in final, but that there is not a message or a paperclip to give results.    Call taken on 6/26/2017 at 8:58 AM by Nury Brown  [facility] [place] [facility]

## 2017-06-26 NOTE — TELEPHONE ENCOUNTER
Pt called with urgency and burning with urination.  Pt was treated with Cipro for UTI last week and urine cx f/u was ordered.  Home care dropped off urine spec, reflex order was done, no culture.  Pt will push fluids and f/u tomorrow if symptoms persist.  KpavelRN

## 2017-06-26 NOTE — TELEPHONE ENCOUNTER
Reason for Call:  Other med questions     Detailed comments: pt  is calling wanting to know if the pt still needed to be on Azo     Phone Number Patient can be reached at: Home number on file [phone] (home)    Best Time: any     Can we leave a detailed message on this number? YES    Call taken on 6/26/2017 at 10:34 AM by Baylee Orantes

## 2017-07-08 ENCOUNTER — HEALTH MAINTENANCE LETTER (OUTPATIENT)
Age: 67
End: 2017-07-08

## 2017-07-11 NOTE — PROGRESS NOTES
Clinic Care Coordination Contact  OUTREACH    Referral Information:  Referral Source: CTS  Reason for Contact: Follow up       Clinical Concerns:  Current Medical Concerns: Patient reports urinary symptoms are resolved and is happy about this. Patient has follow up appointments set with ortho for her knee and PT for therapy. No other concerns at this time.     Current Behavioral Concerns: none    Education Provided to patient: Encouraged follow up appointment with PCP.   Medication Management:  Patient is knowledgeable on medications and is adherent. No financial concerns at this time.        Plan: 1) Patient will continue to follow treatment plan as directed and follow up with PCP with concerns ongoing.   2) Care Coordination to remain available for future needs. Follow up planned for next month    Ernesto Alvarez RN  Clinic Care Coordinator  [phone] or [phone]

## 2017-07-13 ENCOUNTER — HOSPITAL ENCOUNTER (OUTPATIENT)
Dept: PHYSICAL THERAPY | Facility: CLINIC | Age: 67
Setting detail: THERAPIES SERIES
End: 2017-07-13
Attending: ORTHOPAEDIC SURGERY
Payer: MEDICARE

## 2017-07-13 PROCEDURE — G8979 MOBILITY GOAL STATUS: HCPCS | Mod: GP,CI | Performed by: PHYSICAL THERAPIST

## 2017-07-13 PROCEDURE — 97140 MANUAL THERAPY 1/> REGIONS: CPT | Mod: GP | Performed by: PHYSICAL THERAPIST

## 2017-07-13 PROCEDURE — 97161 PT EVAL LOW COMPLEX 20 MIN: CPT | Mod: GP | Performed by: PHYSICAL THERAPIST

## 2017-07-13 PROCEDURE — G8978 MOBILITY CURRENT STATUS: HCPCS | Mod: GP,CK | Performed by: PHYSICAL THERAPIST

## 2017-07-13 PROCEDURE — 97110 THERAPEUTIC EXERCISES: CPT | Mod: GP | Performed by: PHYSICAL THERAPIST

## 2017-07-13 NOTE — PROGRESS NOTES
[facility]          OUTPATIENT PHYSICAL THERAPY ORTHOPEDIC EVALUATION  PLAN OF TREATMENT FOR OUTPATIENT REHABILITATION  (COMPLETE FOR INITIAL CLAIMS ONLY)  Patient's Last Name, First Name, M.I.  YOB: 1950  Anna Dahl    Provider s Name:  [facility]   Medical Record No.  [id number]   Start of Care Date:  07/13/17   Onset Date:  05/14/17 (Date of fall)   Type:     _X__PT   ___OT   ___SLP Medical Diagnosis:  S/P Right TKA     PT Diagnosis:  Right TKA leading to ROM and strength deficits   Visits from SOC:  1      _________________________________________________________________________________  Plan of Treatment/Functional Goals:  manual therapy, joint mobilization, ROM, strengthening, stretching, neuromuscular re-education     Cryotherapy, Hot packs     Goals  Goal Identifier: HEP  Goal Description: Pt will be independent in [facility] in order to achive and maintain long term treatment goals.  Target Date: 09/07/17    Goal Identifier: Walking run errands  Goal Description: Pt will be able to walk community distances in order to go shopping without an assistive device.  Target Date: 09/07/17    Goal Identifier: Stairs  Goal Description: Pt will be able to ascend and descend 1 flight of stairs with a reciprical gait pattern with a handrail assist safely.  Target Date: 09/07/17    Goal Identifier: Sit 2 hours   Goal Description: Pt will be able to sit for 2 hours with her grandchildren with a minimal increase in knee pain.  Target Date: 09/07/17                                                Therapy Frequency:  1 time/week  Predicted Duration of Therapy Intervention:  8 weeks    Thomas Lakhani, PT                 I CERTIFY THE NEED FOR THESE SERVICES FURNISHED UNDER        THIS PLAN OF TREATMENT AND WHILE UNDER MY CARE .             Physician Signature               Date    X_____________________________________________________                              Certification Date From:  07/13/17   Certification Date To:  09/07/17    Referring Provider:  Colin Krause    Initial Assessment        See Epic Evaluation Start of Care Date: 07/13/17

## 2017-07-13 NOTE — PROGRESS NOTES
Anna Dahl  1950 Physical Therapy Initial Evaluation  07/13/17 1300   General Information   Type of Visit Initial OP Ortho PT Evaluation   Start of Care Date 07/13/17   Referring Physician Colin Krause   Patient/Family Goals Statement Walk without pain / Get motion back in knee   Orders Evaluate and Treat   Orders Comment Please work on knee ROM, quad strengthening, and gait training with modalities as indicated   Date of Order 05/30/17   Insurance Type Medicare;Other  ([employer])   Insurance Comments/Visits Authorized [id number]   Medical Diagnosis S/P Right TKA   Surgical/Medical history reviewed Yes   Precautions/Limitations no known precautions/limitations   Body Part(s)   Body Part(s) Knee   Presentation and Etiology   Pertinent history of current problem (include personal factors and/or comorbidities that impact the POC) Surgery on 6/1/2017. Fell off a 4 foot ladder and fractured tibia. Has had homecaring at home until last week. Did not have a CPM at home. Had a fractured tibia and that has healed. Had a couple of setbacks during homecare due to swelling and pain. / Comorbidities - Mild intermittent asthma exacerbation, Coronary artery disease, Anxiety, Osteoperisis, Acute kidney failure     Impairments A. Pain;B. Decreased WB tolerance;C. Swelling;D. Decreased ROM;E. Decreased flexibility;J. Burning;K. Numbness;L. Tingling   Functional Limitations perform activities of daily living;perform desired leisure / sports activities   Symptom Location Right knee   How/Where did it occur Other;With a fall;At home  (Surgery)   Onset date of current episode/exacerbation 05/14/17  (Date of fall)   Chronicity Chronic   Pain rating (0-10 point scale) Best (/10);Worst (/10)   Best (/10) 0   Worst (/10) 5   Pain quality E. Shooting   Frequency of pain/symptoms C. With activity   Pain/symptoms exacerbated by M. Other   Pain exacerbation comment Exercise   Pain/symptoms eased by C. Rest;H. Cold;I. OTC medication(s)    Progression of symptoms since onset: Improved   Prior Level of Function   Functional Level Prior Comment Ind   Current Level of Function   Patient role/employment history F. Retired   Living environment House/townhome   Home/community accessibility 1 flight of stairs with rail   Current equipment-Gait/Locomotion Standard cane   Fall Risk Screen   Fall screen completed by PT   Per patient - Fall 2 or more times in past year? No   Per patient - Fall with injury in past year? Yes   Is patient a fall risk? Yes;Department fall risk interventions implemented   Knee Objective Findings   Side (if bilateral, select both right and left) Right   Integumentary  Moderate swelling on right   Gait/Locomotion Slow and deliberate. Uses cane appropriately   Right Knee Extension PROM 9 degrees short of TKE   Right Knee Flexion PROM 84   Right Knee Flexion Strength 4/5 B   Right Knee Extension Strength Quad lag with SLR    Right Hip Abduction Strength 4-/5 B   Right Quadricep Flexibility Moderately restricted   Planned Therapy Interventions   Planned Therapy Interventions manual therapy;joint mobilization;ROM;strengthening;stretching;neuromuscular re-education   Planned Modality Interventions   Planned Modality Interventions Cryotherapy;Hot packs   Clinical Impression   Criteria for Skilled Therapeutic Interventions Met yes, treatment indicated   PT Diagnosis Right TKA leading to ROM and strength deficits   Influenced by the following impairments Pain, ROM deficit, Strength deficit   Functional limitations due to impairments Difficulty walking, sitting, stairs   Clinical Presentation Stable/Uncomplicated   Clinical Presentation Rationale Several comorbidities impacting PT / 1 body system / Stable   Clinical Decision Making (Complexity) Low complexity   Therapy Frequency 1 time/week   Predicted Duration of Therapy Intervention (days/wks) 8 weeks   Risk & Benefits of therapy have been explained Yes   Patient, Family & other staff in  agreement with plan of care Yes   Education Assessment   Preferred Learning Style Listening;Reading;Demonstration;Pictures/video   Barriers to Learning No barriers   ORTHO GOALS   PT Ortho Eval Goals 1;2;3;4   Ortho Goal 1   Goal Identifier HEP   Goal Description Pt will be independent in HEP in order to achive and maintain long term treatment goals.   Target Date 09/07/17   Ortho Goal 2   Goal Identifier Walking run errands   Goal Description Pt will be able to walk community distances in order to go shopping without an assistive device.   Target Date 09/07/17   Ortho Goal 3   Goal Identifier Stairs   Goal Description Pt will be able to ascend and descend 1 flight of stairs with a reciprical gait pattern with a handrail assist safely.   Target Date 09/07/17   Ortho Goal 4   Goal Identifier Sit 2 hours    Goal Description Pt will be able to sit for 2 hours with her grandchildren with a minimal increase in knee pain.   Target Date 09/07/17   Total Evaluation Time   Total Evaluation Time 20   Therapy Certification   Certification date from 07/13/17   Certification date to 09/07/17   Medical Diagnosis S/P Right TKA     Darren Lakhani, PT, DPT

## 2017-07-19 ENCOUNTER — HOSPITAL ENCOUNTER (OUTPATIENT)
Dept: PHYSICAL THERAPY | Facility: CLINIC | Age: 67
Setting detail: THERAPIES SERIES
End: 2017-07-19
Attending: ORTHOPAEDIC SURGERY
Payer: MEDICARE

## 2017-07-19 PROCEDURE — 97110 THERAPEUTIC EXERCISES: CPT | Mod: GP | Performed by: PHYSICAL THERAPIST

## 2017-07-19 PROCEDURE — 40000718 ZZHC STATISTIC PT DEPARTMENT ORTHO VISIT: Performed by: PHYSICAL THERAPIST

## 2017-07-25 ENCOUNTER — HOSPITAL ENCOUNTER (OUTPATIENT)
Dept: PHYSICAL THERAPY | Facility: CLINIC | Age: 67
Setting detail: THERAPIES SERIES
End: 2017-07-25
Attending: ORTHOPAEDIC SURGERY
Payer: MEDICARE

## 2017-07-25 PROCEDURE — 40000718 ZZHC STATISTIC PT DEPARTMENT ORTHO VISIT: Performed by: PHYSICAL THERAPIST

## 2017-07-25 PROCEDURE — 97110 THERAPEUTIC EXERCISES: CPT | Mod: GP | Performed by: PHYSICAL THERAPIST

## 2017-07-27 ENCOUNTER — HOSPITAL ENCOUNTER (OUTPATIENT)
Dept: PHYSICAL THERAPY | Facility: CLINIC | Age: 67
Setting detail: THERAPIES SERIES
End: 2017-07-27
Attending: ORTHOPAEDIC SURGERY
Payer: MEDICARE

## 2017-07-27 PROCEDURE — 97110 THERAPEUTIC EXERCISES: CPT | Mod: GP | Performed by: PHYSICAL THERAPIST

## 2017-07-27 PROCEDURE — 40000718 ZZHC STATISTIC PT DEPARTMENT ORTHO VISIT: Performed by: PHYSICAL THERAPIST

## 2017-08-01 ENCOUNTER — HOSPITAL ENCOUNTER (OUTPATIENT)
Dept: PHYSICAL THERAPY | Facility: CLINIC | Age: 67
Setting detail: THERAPIES SERIES
End: 2017-08-01
Attending: ORTHOPAEDIC SURGERY
Payer: MEDICARE

## 2017-08-01 PROCEDURE — 97110 THERAPEUTIC EXERCISES: CPT | Mod: GP | Performed by: PHYSICAL THERAPIST

## 2017-08-01 PROCEDURE — 40000718 ZZHC STATISTIC PT DEPARTMENT ORTHO VISIT: Performed by: PHYSICAL THERAPIST

## 2017-08-08 ENCOUNTER — HOSPITAL ENCOUNTER (OUTPATIENT)
Dept: PHYSICAL THERAPY | Facility: CLINIC | Age: 67
Setting detail: THERAPIES SERIES
End: 2017-08-08
Attending: ORTHOPAEDIC SURGERY
Payer: MEDICARE

## 2017-08-08 PROCEDURE — 97140 MANUAL THERAPY 1/> REGIONS: CPT | Mod: GP | Performed by: PHYSICAL THERAPIST

## 2017-08-08 PROCEDURE — 97110 THERAPEUTIC EXERCISES: CPT | Mod: GP | Performed by: PHYSICAL THERAPIST

## 2017-08-08 PROCEDURE — 40000718 ZZHC STATISTIC PT DEPARTMENT ORTHO VISIT: Performed by: PHYSICAL THERAPIST

## 2017-08-15 ENCOUNTER — HOSPITAL ENCOUNTER (OUTPATIENT)
Dept: PHYSICAL THERAPY | Facility: CLINIC | Age: 67
Setting detail: THERAPIES SERIES
End: 2017-08-15
Attending: ORTHOPAEDIC SURGERY
Payer: MEDICARE

## 2017-08-15 PROCEDURE — G8978 MOBILITY CURRENT STATUS: HCPCS | Mod: GP,CJ | Performed by: PHYSICAL THERAPIST

## 2017-08-15 PROCEDURE — 40000718 ZZHC STATISTIC PT DEPARTMENT ORTHO VISIT: Performed by: PHYSICAL THERAPIST

## 2017-08-15 PROCEDURE — 97110 THERAPEUTIC EXERCISES: CPT | Mod: GP | Performed by: PHYSICAL THERAPIST

## 2017-08-15 PROCEDURE — G8979 MOBILITY GOAL STATUS: HCPCS | Mod: GP,CI | Performed by: PHYSICAL THERAPIST

## 2017-08-15 NOTE — PROGRESS NOTES
Anna Dahl  1950  Diagnosis - S/P Right TKA Physical Therapy Progress Note  08/15/17 1300   Signing Clinician's Name / Credentials   Signing clinician's name / credentials Darren Lakhani, PT, DPT   Session Number   Session Number 7 (Start of Care Date - 7/13/2017)   Progress Note/Recertification   Progress Note Due Date 08/13/17   Progress Note Completed Date 08/15/17   Recertification Due Date 09/07/17   PT Medicare Only G-code   G-code Mobility: Walking & Moving Around   Mobility: Walking & Moving Around   Mobility Current Status,  (eval/re-eval & every progress note) CJ: 20-39% impairment   Current Mobility Modifier Rationale Based on clinical judgment and the patient's ability to perform transfers, ambulate, and perform exercises within PT session. Also based on the patient's subjective report of ability to perform functional activities.   Mobility Goal,  (eval/re-eval, every progress note, & discharge) CI: 1-19% impairment   Adult Goals   PT Ortho Eval Goals 1;2;3;4   Ortho Goal 1   Goal Identifier HEP   Goal Description Pt will be independent in HEP in order to achive and maintain long term treatment goals.   Target Date 09/07/17   Date Met 08/15/17   Ortho Goal 2   Goal Identifier Walking run errands   Goal Description Pt will be able to walk community distances in order to go shopping without an assistive device.   Target Date 09/07/17   Date Met 08/15/17   Ortho Goal 3   Goal Identifier Stairs   Goal Description Pt will be able to ascend and descend 1 flight of stairs with a reciprical gait pattern with a handrail assist safely.  (Partially met)   Target Date 09/07/17   Ortho Goal 4   Goal Identifier Sit 2 hours    Goal Description Pt will be able to sit for 2 hours with her grandchildren with a minimal increase in knee pain.   Target Date 09/07/17   Date Met 08/15/17   Subjective Report   Subjective Report Just having trouble with stairs right now. No other issues    Objective Measures    Objective Measures Objective Measure 1;Objective Measure 2;Objective Measure 3   Objective Measure 1   Objective Measure ROM   Details 8-110   Objective Measure 2   Objective Measure Stairs   Details Mild difficulty with eccentric stair descent   Objective Measure 3   Objective Measure Gait   Details No assistive device needed   Treatment Interventions   Interventions Therapeutic Procedure/Exercise;Manual Therapy;Gait Training   Therapeutic Procedure/exercise   Minutes 25   Skilled Intervention ROM and strengthening exercise instruction   Patient Response Performs exercises well with minimal cueing required   Treatment Detail Bike with seat at 2 holes showing x5 minutes / AAROM for flexion x15 with 10 second holds / PROM for knee flexion x4 minutes / AAROM for knee extension with overpressure x10 with 10 second holds / HS Stretch with overpressure for knee extension x6 / Mini Squats x15 /    Gait Training   Minutes 4   Skilled Intervention Stair training   Patient Response Slight difficulty with eccentric descent   Treatment Detail 4 stairs x6 reps with handrail assist   Plan   Homework Surgery June 1   Home program Step-ups / Knee flexion on stairs / AAROM for flexion x10 with 10 second holds / AAROM for knee extension x10 with 10 second holds / SLR in supine 15 / Heel slides x15 / Standing hip abduction x15 B with YTB   Plan for next session Possible DC over next 1-2 visits / Stair training / Progress ROM and strengthening as tolerated / Bike   Comments   Comments Pt is doing well in physical therapy and has met most goals. Pt still has some difficulty with stair descent, however this is improving as ROM and LE strength improve. Pt would benefit from additional physical therapy in order to meet remaining goal.   Total Session Time   Timed Code Treatment Minutes 29   Total Treatment Time (sum of timed and untimed services) 29 (2TE)     Referring Physician - Colin Krause

## 2017-09-28 ENCOUNTER — DOCUMENTATION ONLY (OUTPATIENT)
Dept: PHYSICAL THERAPY | Facility: CLINIC | Age: 67
End: 2017-09-28

## 2017-09-28 NOTE — PROGRESS NOTES
Anna Dahl  1950  Diagnosis - S/P Right TKA Physical Therapy Discharge Note  (Information from last visit on 08/15/17 unless noted)   Signing Clinician's Name / Credentials   Signing clinician's name / credentials Darren Lakhani, PT, DPT   Session Number   Session Number 7 (Start of Care Date - 7/13/2017)   Progress Note/Recertification   Progress Note Due Date 08/13/17   Progress Note Completed Date 08/15/17   Recertification Due Date 09/07/17   PT Medicare Only G-code   G-code Mobility: Walking & Moving Around   Mobility: Walking & Moving Around   Mobility Current Status,  (eval/re-eval & every progress note) CJ: 20-39% impairment   Current Mobility Modifier Rationale Based on clinical judgment and the patient's ability to perform transfers, ambulate, and perform exercises within PT session. Also based on the patient's subjective report of ability to perform functional activities.   Mobility Goal,  (eval/re-eval, every progress note, & discharge) CI: 1-19% impairment   Adult Goals   PT Ortho Eval Goals 1;2;3;4   Ortho Goal 1   Goal Identifier HEP   Goal Description Pt will be independent in HEP in order to achive and maintain long term treatment goals.   Target Date 09/07/17   Date Met 08/15/17   Ortho Goal 2   Goal Identifier Walking run errands   Goal Description Pt will be able to walk community distances in order to go shopping without an assistive device.   Target Date 09/07/17   Date Met 08/15/17   Ortho Goal 3   Goal Identifier Stairs   Goal Description Pt will be able to ascend and descend 1 flight of stairs with a reciprical gait pattern with a handrail assist safely.  (Partially met)   Target Date 09/07/17   Ortho Goal 4   Goal Identifier Sit 2 hours    Goal Description Pt will be able to sit for 2 hours with her grandchildren with a minimal increase in knee pain.   Target Date 09/07/17   Date Met 08/15/17   Subjective Report   Subjective Report Just having trouble with stairs right  now. No other issues    Objective Measures   Objective Measures Objective Measure 1;Objective Measure 2;Objective Measure 3   Objective Measure 1   Objective Measure ROM   Details 8-110   Objective Measure 2   Objective Measure Stairs   Details Mild difficulty with eccentric stair descent   Objective Measure 3   Objective Measure Gait   Details No assistive device needed   Treatment Interventions   Interventions Therapeutic Procedure/Exercise;Manual Therapy;Gait Training   Therapeutic Procedure/exercise   Minutes 25   Skilled Intervention ROM and strengthening exercise instruction   Patient Response Performs exercises well with minimal cueing required   Treatment Detail Bike with seat at 2 holes showing x5 minutes / AAROM for flexion x15 with 10 second holds / PROM for knee flexion x4 minutes / AAROM for knee extension with overpressure x10 with 10 second holds / HS Stretch with overpressure for knee extension x6 / Mini Squats x15 /    Gait Training   Minutes 4   Skilled Intervention Stair training   Patient Response Slight difficulty with eccentric descent   Treatment Detail 4 stairs x6 reps with handrail assist   Plan   Homework Surgery June 1   Home program Step-ups / Knee flexion on stairs / AAROM for flexion x10 with 10 second holds / AAROM for knee extension x10 with 10 second holds / SLR in supine 15 / Heel slides x15 / Standing hip abduction x15 B with YTB   Plan for next session  (From 9/28/2017) Discharged   Comments   Comments  (From 9/28/2017) AT last visit pt was doing well in physical therapy and had met most goals. Pt still had some difficulty with stair descent, however this was improving as ROM and LE strength improve. Pt has not returned to PT in over 1 month and it is assumed that she has done well independently. Pt is discharged to [facility].   Total Session Time   Timed Code Treatment Minutes 29   Total Treatment Time (sum of timed and untimed services) 29 (2TE)     Referring Physician - Colin Krause

## 2017-11-01 ENCOUNTER — ALLIED HEALTH/NURSE VISIT (OUTPATIENT)
Dept: FAMILY MEDICINE | Facility: CLINIC | Age: 67
End: 2017-11-01
Payer: COMMERCIAL

## 2017-11-01 DIAGNOSIS — Z23 NEED FOR PROPHYLACTIC VACCINATION AND INOCULATION AGAINST INFLUENZA: Primary | ICD-10-CM

## 2017-11-01 PROCEDURE — 99207 ZZC NO CHARGE NURSE ONLY: CPT

## 2017-11-01 PROCEDURE — G0008 ADMIN INFLUENZA VIRUS VAC: HCPCS

## 2017-11-01 PROCEDURE — 90662 IIV NO PRSV INCREASED AG IM: CPT

## 2017-11-01 NOTE — MR AVS SNAPSHOT
After Visit Summary   11/1/2017    Anna Dahl    MRN: [id number]           Patient Information     Date Of Birth          1950        Visit Information        Provider Department      11/1/2017 10:00 AM Nena/Brenda Villarreal [facility]        Today's Diagnoses     Need for prophylactic vaccination and inoculation against influenza    -  1       Follow-ups after your visit        Who to contact     If you have questions or need follow up information about today's clinic visit or your schedule please contact [facility] directly at [phone].  Normal or non-critical lab and imaging results will be communicated to you by [email or web address], letter or phone within 4 business days after the clinic has received the results. If you do not hear from us within 7 days, please contact the clinic through BlenderHouset or phone. If you have a critical or abnormal lab result, we will notify you by phone as soon as possible.  Submit refill requests through Affashion or call your pharmacy and they will forward the refill request to us. Please allow 3 business days for your refill to be completed.          Additional Information About Your Visit        MyChart Information     Affashion gives you secure access to your electronic health record. If you see a primary care provider, you can also send messages to your care team and make appointments. If you have questions, please call your primary care clinic.  If you do not have a primary care provider, please call [phone] and they will assist you.        Care EveryWhere ID     This is your Care EveryWhere ID. This could be used by other organizations to access your [place] medical records  [id number]         Blood Pressure from Last 3 Encounters:   06/17/17 120/72   06/08/17 110/73   06/04/17 103/86    Weight from Last 3 Encounters:   06/01/17 212 lb 11.9 oz (96.5 kg)   05/31/17 195 lb (88.5 kg)   05/19/17 195 lb (88.5 kg)              We  Performed the Following     ADMIN INFLUENZA (For MEDICARE Patients ONLY) []     FLU VACCINE, INCREASED ANTIGEN, PRESV FREE, AGE 65+ [60743]        Primary Care Provider Office Phone # Fax #    [facility] [phone] [address]  [facility] [address]        Equal Access to Services     KENJI WHEELER : Hadii aad ku hadasho Soomaali, waaxda luqadaha, qaybta kaalmada adeegyada, waxodalys chavezin hayaan adeerum luuirenebuddy mann . So [facility] [phone].    ATENCIÓN: Si habla español, tiene a chavez disposición servicios gratuitos de asistencia lingüística. Veronica al [phone].    We comply with applicable federal civil rights laws and [place] laws. We do not discriminate on the basis of race, color, national origin, age, disability, sex, sexual orientation, or gender identity.            Thank you!     Thank you for choosing [facility]  for your care. Our goal is always to provide you with excellent care. Hearing back from our patients is one way we can continue to improve our services. Please take a few minutes to complete the written survey that you may receive in the mail after your visit with us. Thank you!             Your Updated Medication List - Protect others around you: Learn how to safely use, store and throw away your medicines at www.disposemymeds.org.          This list is accurate as of: 11/1/17 10:28 AM.  Always use your most recent med list.                   Brand Name Dispense Instructions for use Diagnosis    acetaminophen 325 MG tablet    TYLENOL    250 tablet    Take 2 tablets by mouth every 4 hours as needed.        albuterol 108 (90 BASE) MCG/ACT Inhaler    PROAIR HFA/PROVENTIL HFA/VENTOLIN HFA    1 Inhaler    Inhale 2 puffs into the lungs every 4 hours as needed for shortness of breath / dyspnea    Mild intermittent asthma with exacerbation       alendronate 70 MG tablet    FOSAMAX    13 tablet    Take 1 tablet (70 mg) by mouth every 7  days Take with over 8 ounces water and stay upright for at least 30 minutes after dose.  Take at least 60 minutes before breakfast.    Osteoporosis       aspirin 325 MG EC tablet     40 tablet    Take 1 tablet (325 mg) by mouth daily    Status post total right knee replacement       atorvastatin 40 MG tablet    LIPITOR    90 tablet    Take 1 tablet (40 mg) by mouth daily    Coronary artery disease involving native coronary artery of native heart without angina pectoris       cyclobenzaprine 5 MG tablet    FLEXERIL    21 tablet    Take 1 tablet (5 mg) by mouth 3 times daily as needed for muscle spasms    Status post total right knee replacement       ferrous fumarate 65 mg ([place]. FE)-Vitamin C 125 mg  MG Tabs tablet    VITRON C    30 tablet    Take 1 tablet by mouth 2 times daily    Status post total right knee replacement       fluticasone 110 MCG/ACT Inhaler    FLOVENT HFA    3 Inhaler    Inhale 2 puffs into the lungs 2 times daily    Mild intermittent asthma with exacerbation       fluticasone 50 MCG/ACT spray    FLONASE    16 g    Spray 2 sprays into both nostrils daily    Chronic sinusitis, unspecified location       hydrOXYzine 25 MG tablet    ATARAX    30 tablet    Take 1-2 tablets (25-50 mg) by mouth every 4 hours as needed for itching    Status post total right knee replacement       lisinopril 5 MG tablet    PRINIVIL/ZESTRIL    90 tablet    Take 1 tablet (5 mg) by mouth daily    Coronary artery disease involving native coronary artery of native heart without angina pectoris       * LORazepam 1 MG tablet    ATIVAN    10 tablet    Take 0.5-1 tablets (0.5-1 mg) by mouth every 8 hours as needed for anxiety Take the night before a big event; may repeat in the AM if needed.    Anxiety       * LORazepam 1 MG tablet    ATIVAN    10 tablet    Take 1 tablet (1 mg) by mouth every 6 hours as needed for muscle spasms        metoprolol 25 MG 24 hr tablet    TOPROL XL    90 tablet    Take 1 tablet (25 mg) by mouth  daily    Coronary artery disease involving native coronary artery of native heart without angina pectoris       montelukast 10 MG tablet    SINGULAIR    90 tablet    Take 1 tablet (10 mg) by mouth At Bedtime    Chronic rhinitis       nitroGLYcerin 0.4 MG sublingual tablet    NITROSTAT    25 tablet    Place 1 tablet (0.4 mg) under the tongue every 5 minutes as needed for chest pain    CAD (coronary artery disease)       omeprazole 40 MG capsule    priLOSEC    180 capsule    Take 1 capsule (40 mg) by mouth every 12 hours    Gastroesophageal reflux disease without esophagitis       * order for DME     1 Device    Equipment being ordered: immobilizer    Tibial plateau fracture, right, closed, initial encounter       * order for DME     1 Units    Equipment being ordered: Walker Wheels () Treatment Diagnosis: s/p TKA    Status post total right knee replacement       oxyCODONE 5 MG IR tablet    ROXICODONE    60 tablet    Take 1-2 tablets (5-10 mg) by mouth every 3 hours as needed for moderate to severe pain    Status post total right knee replacement       phenazopyridine 97.5 MG tablet    AZO    24 tablet    Take 2 tablets (195 mg) by mouth 3 times daily as needed for urinary tract discomfort (or bladder spasms; this drug will turn urine orange)    UTI due to Klebsiella species       senna-docusate 8.6-50 MG per tablet    SENOKOT-S;PERICOLACE    100 tablet    Take 1-2 tablets by mouth 2 times daily    Status post total right knee replacement       sertraline 25 MG tablet    ZOLOFT    90 tablet    Take 1 tablet (25 mg) by mouth daily    Anxiety       sucralfate 1 GM tablet    CARAFATE    360 tablet    Take 1 tablet (1 g) by mouth 4 times daily May dissolve in 2 tsp water.    Epigastric pain, Hiatal hernia       sulfamethoxazole-trimethoprim 800-160 MG per tablet    BACTRIM DS/SEPTRA DS    14 tablet    Take 1 tablet by mouth 2 times daily    UTI due to Klebsiella species       * Notice:  This list has 4 medication(s)  that are the same as other medications prescribed for you. Read the directions carefully, and ask your doctor or other care provider to review them with you.

## 2017-11-01 NOTE — PROGRESS NOTES

## 2018-04-27 ENCOUNTER — OFFICE VISIT (OUTPATIENT)
Dept: FAMILY MEDICINE | Facility: CLINIC | Age: 68
End: 2018-04-27
Payer: COMMERCIAL

## 2018-04-27 VITALS
HEART RATE: 56 BPM | WEIGHT: 215 LBS | HEIGHT: 65 IN | DIASTOLIC BLOOD PRESSURE: 84 MMHG | RESPIRATION RATE: 16 BRPM | TEMPERATURE: 98.4 F | BODY MASS INDEX: 35.82 KG/M2 | SYSTOLIC BLOOD PRESSURE: 136 MMHG | OXYGEN SATURATION: 98 %

## 2018-04-27 DIAGNOSIS — I25.10 CORONARY ARTERY DISEASE INVOLVING NATIVE CORONARY ARTERY OF NATIVE HEART WITHOUT ANGINA PECTORIS: ICD-10-CM

## 2018-04-27 DIAGNOSIS — R10.13 EPIGASTRIC PAIN: ICD-10-CM

## 2018-04-27 DIAGNOSIS — Z12.11 SPECIAL SCREENING FOR MALIGNANT NEOPLASMS, COLON: ICD-10-CM

## 2018-04-27 DIAGNOSIS — M81.0 OSTEOPOROSIS, UNSPECIFIED OSTEOPOROSIS TYPE, UNSPECIFIED PATHOLOGICAL FRACTURE PRESENCE: ICD-10-CM

## 2018-04-27 DIAGNOSIS — J45.21 MILD INTERMITTENT ASTHMA WITH EXACERBATION: Primary | ICD-10-CM

## 2018-04-27 DIAGNOSIS — F41.9 ANXIETY: ICD-10-CM

## 2018-04-27 DIAGNOSIS — K44.9 HIATAL HERNIA: ICD-10-CM

## 2018-04-27 DIAGNOSIS — J32.9 CHRONIC SINUSITIS, UNSPECIFIED LOCATION: ICD-10-CM

## 2018-04-27 DIAGNOSIS — Z11.59 NEED FOR HEPATITIS C SCREENING TEST: ICD-10-CM

## 2018-04-27 PROCEDURE — 80061 LIPID PANEL: CPT | Performed by: FAMILY MEDICINE

## 2018-04-27 PROCEDURE — 84443 ASSAY THYROID STIM HORMONE: CPT | Performed by: FAMILY MEDICINE

## 2018-04-27 PROCEDURE — 80048 BASIC METABOLIC PNL TOTAL CA: CPT | Performed by: FAMILY MEDICINE

## 2018-04-27 PROCEDURE — 99214 OFFICE O/P EST MOD 30 MIN: CPT | Performed by: FAMILY MEDICINE

## 2018-04-27 PROCEDURE — 86803 HEPATITIS C AB TEST: CPT | Performed by: FAMILY MEDICINE

## 2018-04-27 PROCEDURE — 36415 COLL VENOUS BLD VENIPUNCTURE: CPT | Performed by: FAMILY MEDICINE

## 2018-04-27 RX ORDER — SERTRALINE HYDROCHLORIDE 25 MG/1
25 TABLET, FILM COATED ORAL DAILY
Qty: 90 TABLET | Refills: 3 | Status: SHIPPED | OUTPATIENT
Start: 2018-04-27 | End: 2019-04-24

## 2018-04-27 RX ORDER — METOPROLOL SUCCINATE 25 MG/1
25 TABLET, EXTENDED RELEASE ORAL DAILY
Qty: 90 TABLET | Refills: 3 | Status: SHIPPED | OUTPATIENT
Start: 2018-04-27 | End: 2019-04-24

## 2018-04-27 RX ORDER — ALBUTEROL SULFATE 90 UG/1
2 AEROSOL, METERED RESPIRATORY (INHALATION) EVERY 4 HOURS PRN
Qty: 1 INHALER | Refills: 2 | Status: SHIPPED | OUTPATIENT
Start: 2018-04-27 | End: 2019-04-24

## 2018-04-27 RX ORDER — ALENDRONATE SODIUM 70 MG/1
70 TABLET ORAL
Qty: 13 TABLET | Refills: 3 | Status: SHIPPED | OUTPATIENT
Start: 2018-04-27 | End: 2019-04-24

## 2018-04-27 RX ORDER — ATORVASTATIN CALCIUM 40 MG/1
40 TABLET, FILM COATED ORAL DAILY
Qty: 90 TABLET | Refills: 3 | Status: SHIPPED | OUTPATIENT
Start: 2018-04-27 | End: 2019-04-24

## 2018-04-27 RX ORDER — FLUTICASONE PROPIONATE 110 UG/1
2 AEROSOL, METERED RESPIRATORY (INHALATION) 2 TIMES DAILY
Qty: 3 INHALER | Refills: 3 | Status: SHIPPED | OUTPATIENT
Start: 2018-04-27 | End: 2019-04-24

## 2018-04-27 RX ORDER — LORAZEPAM 1 MG/1
1 TABLET ORAL EVERY 6 HOURS PRN
Qty: 10 TABLET | Refills: 0 | Status: SHIPPED | OUTPATIENT
Start: 2018-04-27 | End: 2019-05-20

## 2018-04-27 RX ORDER — SUCRALFATE 1 G/1
1 TABLET ORAL 4 TIMES DAILY
Qty: 360 TABLET | Refills: 3 | Status: SHIPPED | OUTPATIENT
Start: 2018-04-27 | End: 2019-09-10

## 2018-04-27 RX ORDER — FLUTICASONE PROPIONATE 50 MCG
2 SPRAY, SUSPENSION (ML) NASAL DAILY
Qty: 16 G | Status: SHIPPED | OUTPATIENT
Start: 2018-04-27 | End: 2019-04-24

## 2018-04-27 ASSESSMENT — ANXIETY QUESTIONNAIRES
6. BECOMING EASILY ANNOYED OR IRRITABLE: NOT AT ALL
GAD7 TOTAL SCORE: 0
1. FEELING NERVOUS, ANXIOUS, OR ON EDGE: NOT AT ALL
3. WORRYING TOO MUCH ABOUT DIFFERENT THINGS: NOT AT ALL
2. NOT BEING ABLE TO STOP OR CONTROL WORRYING: NOT AT ALL
5. BEING SO RESTLESS THAT IT IS HARD TO SIT STILL: NOT AT ALL
7. FEELING AFRAID AS IF SOMETHING AWFUL MIGHT HAPPEN: NOT AT ALL

## 2018-04-27 ASSESSMENT — PATIENT HEALTH QUESTIONNAIRE - PHQ9: 5. POOR APPETITE OR OVEREATING: NOT AT ALL

## 2018-04-27 ASSESSMENT — PAIN SCALES - GENERAL: PAINLEVEL: NO PAIN (0)

## 2018-04-27 NOTE — MR AVS SNAPSHOT
After Visit Summary   4/27/2018    Anna Dahl    MRN: [id number]           Patient Information     Date Of Birth          1950        Visit Information        Provider Department      4/27/2018 1:00 PM Vivek Krause MD [facility]        Today's Diagnoses     Special screening for malignant neoplasms, colon    -  1    Mild intermittent asthma with exacerbation        Coronary artery disease involving native coronary artery of native heart without angina pectoris        Chronic sinusitis, unspecified location        Anxiety        Epigastric pain        Hiatal hernia        Osteoporosis, unspecified osteoporosis type, unspecified pathological fracture presence        Need for hepatitis C screening test          Care Instructions          Thank you for choosing [facility].  You may be receiving a survey in the mail from [employer] regarding your visit today.  Please take a few minutes to complete and return the survey to let us know how we are doing.      Our Clinic hours are:  Mondays    7:20 am - 7 pm  Tues -  Fri  7:20 am - 5 pm    Clinic Phone: [phone]    The clinic lab opens at 7:30 am Mon - Fri and appointments are required.    [facility]. [phone]  Monday-Thursday 8 am - 7pm  Tues/Wed/Fri 8 am - 5:30 pm                 Follow-ups after your visit        Future tests that were ordered for you today     Open Future Orders        Priority Expected Expires Ordered    Fecal colorectal cancer screen (FIT) Routine 5/18/2018 7/20/2018 4/27/2018            Who to contact     If you have questions or need follow up information about today's clinic visit or your schedule please contact [facility] directly at [phone].  Normal or non-critical lab and imaging results will be communicated to you by MyChart, letter or phone within 4 business days after the clinic has received the results. If you do not hear  "from us within 7 days, please contact the clinic through Viximo or phone. If you have a critical or abnormal lab result, we will notify you by phone as soon as possible.  Submit refill requests through Viximo or call your pharmacy and they will forward the refill request to us. Please allow 3 business days for your refill to be completed.          Additional Information About Your Visit        [employer]ar[employer] Information     Viximo gives you secure access to your electronic health record. If you see a primary care provider, you can also send messages to your care team and make appointments. If you have questions, please call your primary care clinic.  If you do not have a primary care provider, please call [phone] and they will assist you.        Care EveryWhere ID     This is your Care EveryWhere ID. This could be used by other organizations to access your [place] medical records  [id number]        Your Vitals Were     Pulse Temperature Respirations Height Pulse Oximetry Breastfeeding?    56 98.4  F (36.9  C) (Tympanic) 16 5' 4.75\" (1.645 m) 98% No    BMI (Body Mass Index)                   36.05 kg/m2            Blood Pressure from Last 3 Encounters:   04/27/18 (!) 148/97   06/17/17 120/72   06/08/17 110/73    Weight from Last 3 Encounters:   04/27/18 215 lb (97.5 kg)   06/01/17 212 lb 11.9 oz (96.5 kg)   05/31/17 195 lb (88.5 kg)              We Performed the Following     Basic metabolic panel     DEPRESSION ACTION PLAN (DAP)     Hepatitis C antibody     Lipid Profile (Chol, Trig, HDL, LDL calc)     TSH          Where to get your medicines      These medications [facility] - [id number]  [address], [place] [address]    Hours:  [facility] [place] [facility] Phone:  [phone]     albuterol 108 (90 Base) MCG/ACT Inhaler    atorvastatin 40 MG tablet    fluticasone 110 MCG/ACT Inhaler    fluticasone 50 MCG/ACT spray    metoprolol succinate 25 MG 24 " hr tablet    sertraline 25 MG tablet    sucralfate 1 GM tablet         Some of these will need a paper prescription and others can be bought over the counter.  Ask your nurse if you have questions.     Bring a paper prescription for each of these medications     alendronate 70 MG tablet    LORazepam 1 MG tablet          Primary Care Provider Office Phone # Fax #    [facility] [phone] [address] [facility] [address]        Equal Access to Services     KENJI WHEELER : Hadii aad ku hadasho Soomaali, waaxda luqadaha, qaybta kaalmada adeegyada, waxay idiin hayaan adeeg kharash la'arely ah. So [facility] [phone].    ATENCIÓN: Si habla español, tiene a chavez disposición servicios gratuitos de asistencia lingüística. [facility] [phone].    We comply with applicable federal civil rights laws and [place] laws. We do not discriminate on the basis of race, color, national origin, age, disability, sex, sexual orientation, or gender identity.            Thank you!     Thank you for choosing [facility]  for your care. Our goal is always to provide you with excellent care. Hearing back from our patients is one way we can continue to improve our services. Please take a few minutes to complete the written survey that you may receive in the mail after your visit with us. Thank you!             Your Updated Medication List - Protect others around you: Learn how to safely use, store and throw away your medicines at www.disposemymeds.org.          This list is accurate as of 4/27/18  1:45 PM.  Always use your most recent med list.                   Brand Name Dispense Instructions for use Diagnosis    acetaminophen 325 MG tablet    TYLENOL    250 tablet    Take 2 tablets by mouth every 4 hours as needed.        albuterol 108 (90 Base) MCG/ACT Inhaler    PROAIR HFA/PROVENTIL HFA/VENTOLIN HFA    1 Inhaler    Inhale 2 puffs into the lungs every 4 hours as needed for  shortness of breath / dyspnea    Mild intermittent asthma with exacerbation       alendronate 70 MG tablet    FOSAMAX    13 tablet    Take 1 tablet (70 mg) by mouth every 7 days Take with over 8 ounces water and stay upright for at least 30 minutes after dose.  Take at least 60 minutes before breakfast.    Osteoporosis, unspecified osteoporosis type, unspecified pathological fracture presence       aspirin 325 MG EC tablet     40 tablet    Take 1 tablet (325 mg) by mouth daily    Status post total right knee replacement       atorvastatin 40 MG tablet    LIPITOR    90 tablet    Take 1 tablet (40 mg) by mouth daily    Coronary artery disease involving native coronary artery of native heart without angina pectoris       cyclobenzaprine 5 MG tablet    FLEXERIL    21 tablet    Take 1 tablet (5 mg) by mouth 3 times daily as needed for muscle spasms    Status post total right knee replacement       ferrous fumarate 65 mg ([place]. FE)-Vitamin C 125 mg  MG Tabs tablet    VITRON C    30 tablet    Take 1 tablet by mouth 2 times daily    Status post total right knee replacement       fluticasone 110 MCG/ACT Inhaler    FLOVENT HFA    3 Inhaler    Inhale 2 puffs into the lungs 2 times daily    Mild intermittent asthma with exacerbation       fluticasone 50 MCG/ACT spray    FLONASE    16 g    Spray 2 sprays into both nostrils daily    Chronic sinusitis, unspecified location       hydrOXYzine 25 MG tablet    ATARAX    30 tablet    Take 1-2 tablets (25-50 mg) by mouth every 4 hours as needed for itching    Status post total right knee replacement       lisinopril 5 MG tablet    PRINIVIL/ZESTRIL    90 tablet    Take 1 tablet (5 mg) by mouth daily    Coronary artery disease involving native coronary artery of native heart without angina pectoris       * LORazepam 1 MG tablet    ATIVAN    10 tablet    Take 0.5-1 tablets (0.5-1 mg) by mouth every 8 hours as needed for anxiety Take the night before a big event; may repeat in the AM  if needed.    Anxiety       * LORazepam 1 MG tablet    ATIVAN    10 tablet    Take 1 tablet (1 mg) by mouth every 6 hours as needed for muscle spasms    Anxiety       metoprolol succinate 25 MG 24 hr tablet    TOPROL XL    90 tablet    Take 1 tablet (25 mg) by mouth daily    Coronary artery disease involving native coronary artery of native heart without angina pectoris       montelukast 10 MG tablet    SINGULAIR    90 tablet    Take 1 tablet (10 mg) by mouth At Bedtime    Chronic rhinitis       nitroGLYcerin 0.4 MG sublingual tablet    NITROSTAT    25 tablet    Place 1 tablet (0.4 mg) under the tongue every 5 minutes as needed for chest pain    CAD (coronary artery disease)       omeprazole 40 MG capsule    priLOSEC    180 capsule    Take 1 capsule (40 mg) by mouth every 12 hours    Gastroesophageal reflux disease without esophagitis       * order for DME     1 Device    Equipment being ordered: immobilizer    Tibial plateau fracture, right, closed, initial encounter       * order for DME     1 Units    Equipment being ordered: Walker Wheels () Treatment Diagnosis: s/p TKA    Status post total right knee replacement       oxyCODONE IR 5 MG tablet    ROXICODONE    60 tablet    Take 1-2 tablets (5-10 mg) by mouth every 3 hours as needed for moderate to severe pain    Status post total right knee replacement       phenazopyridine 97.5 MG tablet    AZO    24 tablet    Take 2 tablets (195 mg) by mouth 3 times daily as needed for urinary tract discomfort (or bladder spasms; this drug will turn urine orange)    UTI due to Klebsiella species       senna-docusate 8.6-50 MG per tablet    SENOKOT-S;PERICOLACE    100 tablet    Take 1-2 tablets by mouth 2 times daily    Status post total right knee replacement       sertraline 25 MG tablet    ZOLOFT    90 tablet    Take 1 tablet (25 mg) by mouth daily    Anxiety       sucralfate 1 GM tablet    CARAFATE    360 tablet    Take 1 tablet (1 g) by mouth 4 times daily May  dissolve in 2 tsp water.    Epigastric pain, Hiatal hernia       sulfamethoxazole-trimethoprim 800-160 MG per tablet    BACTRIM DS/SEPTRA DS    14 tablet    Take 1 tablet by mouth 2 times daily    UTI due to Klebsiella species       * Notice:  This list has 4 medication(s) that are the same as other medications prescribed for you. Read the directions carefully, and ask your doctor or other care provider to review them with you.

## 2018-04-27 NOTE — PATIENT INSTRUCTIONS
Thank you for choosing [facility].  You may be receiving a survey in the mail from [facility] regarding your visit today.  Please take a few minutes to complete and return the survey to let us know how we are doing.      Our Clinic hours are:  Mondays    7:20 am - 7 pm  Tues -  Fri  7:20 am - 5 pm    Clinic Phone: [phone]    The clinic lab opens at 7:30 am Mon - Fri and appointments are required.    [facility]. [phone]  Monday-Thursday 8 am - 7pm  Tues/Wed/Fri 8 am - 5:30 pm

## 2018-04-27 NOTE — PROGRESS NOTES
SUBJECTIVE:   Anna Dahl is a 67 year old female who presents to clinic today for the following health issues:    PATIENT NEW TO ME. Was previously seen by Dr. Nadia Ramirez.     Chief Complaint   Patient presents with     Hypertension     med recheck on all medications and refill     Lipids     med recheck and refill     Asthma     med recheck and refill       Hyperlipidemia Follow-Up      Rate your low fat/cholesterol diet?: good    Taking statin?  Yes, no muscle aches from statin    Other lipid medications/supplements?:  none    Hypertension Follow-up      Outpatient blood pressures are not being checked.    Low Salt Diet: low salt      Amount of exercise or physical activity: None    Problems taking medications regularly: No    Medication side effects: none    Diet: low salt and low fat/cholesterol    ADDITIONAL HPI: 67 year old female here for above issue.  Also has a history of CAD and stent x 6.    ROS: 10 point review of systems negative except as per HPI.    PAST MEDICAL HISTORY:  Past Medical History:   Diagnosis Date     Asthma      Basal cell carcinoma      Calculus of kidney      Gastro-oesophageal reflux disease      Renal colic      Unspecified asthma(493.90)      Unspecified nasal polyp     Nasal polyps     Unspecified otitis media      Unspecified sinusitis (chronic)     Chronic sinusitis        ACTIVE MEDICAL PROBLEMS:  Patient Active Problem List   Diagnosis     Nasal polyp     Hiatal hernia     Colon polyps     GERD (gastroesophageal reflux disease)     Hyperlipidemia LDL goal <100     Advanced directives, counseling/discussion     Mild intermittent asthma with exacerbation     Chronic sinusitis     Non-ST elevation myocardial infarction (NSTEMI), initial care episode (H)     CAD (coronary artery disease)     BCC (basal cell carcinoma), face     Anxiety     Osteoporosis     S/P total knee arthroplasty     Elevated glucose     Acute kidney failure (H)     Postoperative anemia     Health  Care Home        FAMILY HISTORY:  Family History   Problem Relation Age of Onset     HEART DISEASE Father      heart surgery     Lipids Father      Alzheimer Disease Mother      Lipids Brother      Lipids Brother        SOCIAL HISTORY:  Social History     Social History     Marital status:      Spouse name: N/A     Number of children: N/A     Years of education: N/A     Occupational History     Not on file.     Social History Main Topics     Smoking status: Never Smoker     Smokeless tobacco: Never Used     Alcohol use Yes      Comment: moderate couple drinks on weekends     Drug use: No     Sexual activity: Yes     Partners: Male     Other Topics Concern     Parent/Sibling W/ Cabg, Mi Or Angioplasty Before 65f 55m? No     Social History Narrative       MEDICATIONS:  Current Outpatient Prescriptions   Medication Sig Dispense Refill     acetaminophen (TYLENOL) 325 MG tablet Take 2 tablets by mouth every 4 hours as needed. 250 tablet      albuterol (PROAIR HFA/PROVENTIL HFA/VENTOLIN HFA) 108 (90 Base) MCG/ACT Inhaler Inhale 2 puffs into the lungs every 4 hours as needed for shortness of breath / dyspnea 1 Inhaler 2     alendronate (FOSAMAX) 70 MG tablet Take 1 tablet (70 mg) by mouth every 7 days Take with over 8 ounces water and stay upright for at least 30 minutes after dose.  Take at least 60 minutes before breakfast. 13 tablet 3     aspirin  MG EC tablet Take 1 tablet (325 mg) by mouth daily 40 tablet 0     atorvastatin (LIPITOR) 40 MG tablet Take 1 tablet (40 mg) by mouth daily 90 tablet 3     cyclobenzaprine (FLEXERIL) 5 MG tablet Take 1 tablet (5 mg) by mouth 3 times daily as needed for muscle spasms (Patient not taking: Reported on 4/27/2018) 21 tablet 0     ferrous fumarate 65 mg, [place]. FE,-Vitamin C 125 mg (VITRON C)  MG TABS tablet Take 1 tablet by mouth 2 times daily (Patient not taking: Reported on 4/27/2018) 30 tablet 1     fluticasone (FLONASE) 50 MCG/ACT spray Spray 2 sprays into  both nostrils daily 16 g prn     fluticasone (FLOVENT HFA) 110 MCG/ACT Inhaler Inhale 2 puffs into the lungs 2 times daily 3 Inhaler 3     hydrOXYzine (ATARAX) 25 MG tablet Take 1-2 tablets (25-50 mg) by mouth every 4 hours as needed for itching (Patient not taking: Reported on 4/27/2018) 30 tablet 1     lisinopril (PRINIVIL/ZESTRIL) 5 MG tablet Take 1 tablet (5 mg) by mouth daily 90 tablet 3     LORazepam (ATIVAN) 1 MG tablet Take 0.5-1 tablets (0.5-1 mg) by mouth every 8 hours as needed for anxiety Take the night before a big event; may repeat in the AM if needed. 10 tablet 1     LORazepam (ATIVAN) 1 MG tablet Take 1 tablet (1 mg) by mouth every 6 hours as needed for muscle spasms 10 tablet 0     metoprolol succinate (TOPROL XL) 25 MG 24 hr tablet Take 1 tablet (25 mg) by mouth daily 90 tablet 3     montelukast (SINGULAIR) 10 MG tablet Take 1 tablet (10 mg) by mouth At Bedtime 90 tablet 3     nitroglycerin (NITROSTAT) 0.4 MG SL tablet Place 1 tablet (0.4 mg) under the tongue every 5 minutes as needed for chest pain 25 tablet 3     omeprazole (PRILOSEC) 40 MG capsule Take 1 capsule (40 mg) by mouth every 12 hours 180 capsule 3     order for DME Equipment being ordered: immobilizer (Patient not taking: Reported on 4/27/2018) 1 Device 0     order for DME Equipment being ordered: Walker Wheels ()  Treatment Diagnosis: s/p TKA (Patient not taking: Reported on 4/27/2018) 1 Units 0     oxyCODONE (ROXICODONE) 5 MG IR tablet Take 1-2 tablets (5-10 mg) by mouth every 3 hours as needed for moderate to severe pain (Patient not taking: Reported on 4/27/2018) 60 tablet 0     phenazopyridine (AZO) 97.5 MG tablet Take 2 tablets (195 mg) by mouth 3 times daily as needed for urinary tract discomfort (or bladder spasms; this drug will turn urine orange) (Patient not taking: Reported on 4/27/2018) 24 tablet 1     senna-docusate (SENOKOT-S;PERICOLACE) 8.6-50 MG per tablet Take 1-2 tablets by mouth 2 times daily (Patient not  "taking: Reported on 4/27/2018) 100 tablet 1     sertraline (ZOLOFT) 25 MG tablet Take 1 tablet (25 mg) by mouth daily 90 tablet 3     sucralfate (CARAFATE) 1 GM tablet Take 1 tablet (1 g) by mouth 4 times daily May dissolve in 2 tsp water. 360 tablet 3     sulfamethoxazole-trimethoprim (BACTRIM DS/SEPTRA DS) 800-160 MG per tablet Take 1 tablet by mouth 2 times daily (Patient not taking: Reported on 4/27/2018) 14 tablet 0       ALLERGIES:     Allergies   Allergen Reactions     Amoxicillin Anaphylaxis     Rosuvastatin Other (See Comments)     Severe headache, backache     Reglan [Metoclopramide Hcl] Visual Disturbance     Disoriented, hallucinations       Problem list, Medication list, Allergies, and Medical/Social/Surgical histories reviewed in [facility] and updated as appropriate.    OBJECTIVE:                                                    VITALS: /84  Pulse 56  Temp 98.4  F (36.9  C) (Tympanic)  Resp 16  Ht 5' 4.75\" (1.645 m)  Wt 215 lb (97.5 kg)  SpO2 98%  Breastfeeding? No  BMI 36.05 kg/m2 Body mass index is 36.05 kg/(m^2).  GENERAL: Pleasant, well appearing female.  HEENT: PERRL, EOMI, oropharynx clear.  NECK: supple, no thyromegaly or thyroid masses, no lymphadenopathy.  CV: RRR, no murmurs, rubs or gallops.  LUNGS: Clear to auscultation bilaterally, normal effort.  ABDOMEN: Soft, non-distended, non-tender.  No hepatosplenomegaly or palpable masses.    SKIN: warm and dry without obvious rashes.   EXTREMITIES: No edema, normal pulses.     ASSESSMENT/PLAN:                                                    1. Mild intermittent asthma with exacerbation  Well controlled. Refilled medication.     - albuterol (PROAIR HFA/PROVENTIL HFA/VENTOLIN HFA) 108 (90 Base) MCG/ACT Inhaler; Inhale 2 puffs into the lungs every 4 hours as needed for shortness of breath / dyspnea  Dispense: 1 Inhaler; Refill: 2  - fluticasone (FLOVENT HFA) 110 MCG/ACT Inhaler; Inhale 2 puffs into the lungs 2 times daily  Dispense: 3 " Inhaler; Refill: 3    2. Coronary artery disease involving native coronary artery of native heart without angina pectoris  Blood pressure elevated today but all other recent blood pressure measurements have been normal. Will monitor for now given isolated mild elevation. Follow-up for RN blood pressure re-check in 2 weeks.   - atorvastatin (LIPITOR) 40 MG tablet; Take 1 tablet (40 mg) by mouth daily  Dispense: 90 tablet; Refill: 3  - metoprolol succinate (TOPROL XL) 25 MG 24 hr tablet; Take 1 tablet (25 mg) by mouth daily  Dispense: 90 tablet; Refill: 3  - Lipid Profile (Chol, Trig, HDL, LDL calc)  - Basic metabolic panel    3. Chronic sinusitis, unspecified location  Well controlled. Refilled medication.     - fluticasone (FLONASE) 50 MCG/ACT spray; Spray 2 sprays into both nostrils daily  Dispense: 16 g; Refill: prn    4. Anxiety  Well controlled. Refilled medication.     - sertraline (ZOLOFT) 25 MG tablet; Take 1 tablet (25 mg) by mouth daily  Dispense: 90 tablet; Refill: 3  - LORazepam (ATIVAN) 1 MG tablet; Take 1 tablet (1 mg) by mouth every 6 hours as needed for muscle spasms  Dispense: 10 tablet; Refill: 0  - TSH    5. Epigastric pain/GERD  Well controlled. Refilled medication.     - sucralfate (CARAFATE) 1 GM tablet; Take 1 tablet (1 g) by mouth 4 times daily May dissolve in 2 tsp water.  Dispense: 360 tablet; Refill: 3    6. Hiatal hernia  Well controlled. Refilled medication.     - sucralfate (CARAFATE) 1 GM tablet; Take 1 tablet (1 g) by mouth 4 times daily May dissolve in 2 tsp water.  Dispense: 360 tablet; Refill: 3    7. Special screening for malignant neoplasms, colon  - Fecal colorectal cancer screen (FIT); Future    8. Osteoporosis, unspecified osteoporosis type, unspecified pathological fracture presence  Refill medication.  - alendronate (FOSAMAX) 70 MG tablet; Take 1 tablet (70 mg) by mouth every 7 days Take with over 8 ounces water and stay upright for at least 30 minutes after dose.  Take at  least 60 minutes before breakfast.  Dispense: 13 tablet; Refill: 3    9. Need for hepatitis C screening test  - Hepatitis C antibody

## 2018-04-28 LAB
ANION GAP SERPL CALCULATED.3IONS-SCNC: 5 MMOL/L (ref 3–14)
BUN SERPL-MCNC: 18 MG/DL (ref 7–30)
CALCIUM SERPL-MCNC: 9 MG/DL (ref 8.5–10.1)
CHLORIDE SERPL-SCNC: 105 MMOL/L (ref 94–109)
CHOLEST SERPL-MCNC: 179 MG/DL
CO2 SERPL-SCNC: 26 MMOL/L (ref 20–32)
CREAT SERPL-MCNC: 0.82 MG/DL (ref 0.52–1.04)
GFR SERPL CREATININE-BSD FRML MDRD: 69 ML/MIN/1.7M2
GLUCOSE SERPL-MCNC: 98 MG/DL (ref 70–99)
HDLC SERPL-MCNC: 48 MG/DL
LDLC SERPL CALC-MCNC: 109 MG/DL
NONHDLC SERPL-MCNC: 131 MG/DL
POTASSIUM SERPL-SCNC: 4.4 MMOL/L (ref 3.4–5.3)
SODIUM SERPL-SCNC: 136 MMOL/L (ref 133–144)
TRIGL SERPL-MCNC: 108 MG/DL
TSH SERPL DL<=0.005 MIU/L-ACNC: 0.96 MU/L (ref 0.4–4)

## 2018-04-28 ASSESSMENT — ANXIETY QUESTIONNAIRES: GAD7 TOTAL SCORE: 0

## 2018-04-28 ASSESSMENT — PATIENT HEALTH QUESTIONNAIRE - PHQ9: SUM OF ALL RESPONSES TO PHQ QUESTIONS 1-9: 0

## 2018-04-28 ASSESSMENT — ASTHMA QUESTIONNAIRES: ACT_TOTALSCORE: 25

## 2018-04-30 LAB — HCV AB SERPL QL IA: NONREACTIVE

## 2018-04-30 PROCEDURE — 82274 ASSAY TEST FOR BLOOD FECAL: CPT | Performed by: FAMILY MEDICINE

## 2018-05-03 DIAGNOSIS — Z12.11 SPECIAL SCREENING FOR MALIGNANT NEOPLASMS, COLON: ICD-10-CM

## 2018-05-03 LAB — HEMOCCULT STL QL IA: NEGATIVE

## 2018-05-03 NOTE — PROGRESS NOTES
Notified via AppThwack: This is a normal result.  If you continue with FIT testing for colon cancer screening you need to repeat this test yearly.  You can opt for colonoscopy screening at any point which would be every 10 years if normal and no family history of colon cancer.

## 2018-05-11 ENCOUNTER — TELEPHONE (OUTPATIENT)
Dept: FAMILY MEDICINE | Facility: CLINIC | Age: 68
End: 2018-05-11

## 2018-05-11 ENCOUNTER — ALLIED HEALTH/NURSE VISIT (OUTPATIENT)
Dept: FAMILY MEDICINE | Facility: CLINIC | Age: 68
End: 2018-05-11
Payer: COMMERCIAL

## 2018-05-11 VITALS — DIASTOLIC BLOOD PRESSURE: 87 MMHG | HEART RATE: 53 BPM | SYSTOLIC BLOOD PRESSURE: 141 MMHG

## 2018-05-11 DIAGNOSIS — I25.10 CORONARY ARTERY DISEASE INVOLVING NATIVE CORONARY ARTERY OF NATIVE HEART WITHOUT ANGINA PECTORIS: ICD-10-CM

## 2018-05-11 DIAGNOSIS — R03.0 ELEVATED BLOOD-PRESSURE READING WITHOUT DIAGNOSIS OF HYPERTENSION: Primary | ICD-10-CM

## 2018-05-11 PROCEDURE — 99207 ZZC NO CHARGE NURSE ONLY: CPT

## 2018-05-11 RX ORDER — LISINOPRIL 10 MG/1
10 TABLET ORAL DAILY
Qty: 30 TABLET | Refills: 1 | Status: SHIPPED | OUTPATIENT
Start: 2018-05-11 | End: 2018-07-12

## 2018-05-11 NOTE — MR AVS SNAPSHOT
After Visit Summary   5/11/2018    Anna Dahl    MRN: [id number]           Patient Information     Date Of Birth          1950        Visit Information        Provider Department      5/11/2018 1:00 PM FL CL RN [facility]        Today's Diagnoses     Elevated blood-pressure reading without diagnosis of hypertension    -  1       Follow-ups after your visit        Who to contact     If you have questions or need follow up information about today's clinic visit or your schedule please contact [facility] directly at [phone].  Normal or non-critical lab and imaging results will be communicated to you by NexPlanarhart, letter or phone within 4 business days after the clinic has received the results. If you do not hear from us within 7 days, please contact the clinic through Electric Cloudt or phone. If you have a critical or abnormal lab result, we will notify you by phone as soon as possible.  Submit refill requests through JeNaCell or call your pharmacy and they will forward the refill request to us. Please allow 3 business days for your refill to be completed.          Additional Information About Your Visit        MyChart Information     JeNaCell gives you secure access to your electronic health record. If you see a primary care provider, you can also send messages to your care team and make appointments. If you have questions, please call your primary care clinic.  If you do not have a primary care provider, please call [phone] and they will assist you.        Care EveryWhere ID     This is your Care EveryWhere ID. This could be used by other organizations to access your [facility] medical records  [id number]        Your Vitals Were     Pulse                   53            Blood Pressure from Last 3 Encounters:   05/11/18 141/87   04/27/18 136/84   06/17/17 120/72    Weight from Last 3 Encounters:   04/27/18 215 lb (97.5 kg)   06/01/17 212 lb 11.9 oz (96.5  kg)   05/31/17 195 lb (88.5 kg)              Today, you had the following     No orders found for display       Primary Care Provider Office Phone # Fax #    [facility] [phone] [address]  [facility] [id number]        Equal Access to Services     KENJI WHEELER : Hadii aad ku hadasho Soomaali, waaxda luqadaha, qaybta kaalmada adeegyada, waxay scottin haydavidn adeerum carrol lagallo burden. So [facility] [phone].    ATENCIÓN: Si habla español, tiene a chavez disposición servicios gratuitos de asistencia lingüística. Llame al [phone].    We comply with applicable federal civil rights laws and [place] laws. We do not discriminate on the basis of race, color, national origin, age, disability, sex, sexual orientation, or gender identity.            Thank you!     Thank you for choosing [facility]  for your care. Our goal is always to provide you with excellent care. Hearing back from our patients is one way we can continue to improve our services. Please take a few minutes to complete the written survey that you may receive in the mail after your visit with us. Thank you!             Your Updated Medication List - Protect others around you: Learn how to safely use, store and throw away your medicines at www.disposemymeds.org.          This list is accurate as of 5/11/18  1:57 PM.  Always use your most recent med list.                   Brand Name Dispense Instructions for use Diagnosis    acetaminophen 325 MG tablet    TYLENOL    250 tablet    Take 2 tablets by mouth every 4 hours as needed.        albuterol 108 (90 Base) MCG/ACT Inhaler    PROAIR HFA/PROVENTIL HFA/VENTOLIN HFA    1 Inhaler    Inhale 2 puffs into the lungs every 4 hours as needed for shortness of breath / dyspnea    Mild intermittent asthma with exacerbation       alendronate 70 MG tablet    FOSAMAX    13 tablet    Take 1 tablet (70 mg) by mouth every 7 days Take with over 8 ounces water and stay  upright for at least 30 minutes after dose.  Take at least 60 minutes before breakfast.    Osteoporosis, unspecified osteoporosis type, unspecified pathological fracture presence       aspirin 325 MG EC tablet     40 tablet    Take 1 tablet (325 mg) by mouth daily    Status post total right knee replacement       atorvastatin 40 MG tablet    LIPITOR    90 tablet    Take 1 tablet (40 mg) by mouth daily    Coronary artery disease involving native coronary artery of native heart without angina pectoris       cyclobenzaprine 5 MG tablet    FLEXERIL    21 tablet    Take 1 tablet (5 mg) by mouth 3 times daily as needed for muscle spasms    Status post total right knee replacement       ferrous fumarate 65 mg ([place]. FE)-Vitamin C 125 mg  MG Tabs tablet    VITRON C    30 tablet    Take 1 tablet by mouth 2 times daily    Status post total right knee replacement       fluticasone 110 MCG/ACT Inhaler    FLOVENT HFA    3 Inhaler    Inhale 2 puffs into the lungs 2 times daily    Mild intermittent asthma with exacerbation       fluticasone 50 MCG/ACT spray    FLONASE    16 g    Spray 2 sprays into both nostrils daily    Chronic sinusitis, unspecified location       hydrOXYzine 25 MG tablet    ATARAX    30 tablet    Take 1-2 tablets (25-50 mg) by mouth every 4 hours as needed for itching    Status post total right knee replacement       lisinopril 5 MG tablet    PRINIVIL/ZESTRIL    90 tablet    Take 1 tablet (5 mg) by mouth daily    Coronary artery disease involving native coronary artery of native heart without angina pectoris       * LORazepam 1 MG tablet    ATIVAN    10 tablet    Take 0.5-1 tablets (0.5-1 mg) by mouth every 8 hours as needed for anxiety Take the night before a big event; may repeat in the AM if needed.    Anxiety       * LORazepam 1 MG tablet    ATIVAN    10 tablet    Take 1 tablet (1 mg) by mouth every 6 hours as needed for muscle spasms    Anxiety       metoprolol succinate 25 MG 24 hr tablet    TOPROL  XL    90 tablet    Take 1 tablet (25 mg) by mouth daily    Coronary artery disease involving native coronary artery of native heart without angina pectoris       montelukast 10 MG tablet    SINGULAIR    90 tablet    Take 1 tablet (10 mg) by mouth At Bedtime    Chronic rhinitis       nitroGLYcerin 0.4 MG sublingual tablet    NITROSTAT    25 tablet    Place 1 tablet (0.4 mg) under the tongue every 5 minutes as needed for chest pain    CAD (coronary artery disease)       omeprazole 40 MG capsule    priLOSEC    180 capsule    Take 1 capsule (40 mg) by mouth every 12 hours    Gastroesophageal reflux disease without esophagitis       * order for DME     1 Device    Equipment being ordered: immobilizer    Tibial plateau fracture, right, closed, initial encounter       * order for DME     1 Units    Equipment being ordered: Walker Wheels () Treatment Diagnosis: s/p TKA    Status post total right knee replacement       oxyCODONE IR 5 MG tablet    ROXICODONE    60 tablet    Take 1-2 tablets (5-10 mg) by mouth every 3 hours as needed for moderate to severe pain    Status post total right knee replacement       phenazopyridine 97.5 MG tablet    AZO    24 tablet    Take 2 tablets (195 mg) by mouth 3 times daily as needed for urinary tract discomfort (or bladder spasms; this drug will turn urine orange)    UTI due to Klebsiella species       senna-docusate 8.6-50 MG per tablet    SENOKOT-S;PERICOLACE    100 tablet    Take 1-2 tablets by mouth 2 times daily    Status post total right knee replacement       sertraline 25 MG tablet    ZOLOFT    90 tablet    Take 1 tablet (25 mg) by mouth daily    Anxiety       sucralfate 1 GM tablet    CARAFATE    360 tablet    Take 1 tablet (1 g) by mouth 4 times daily May dissolve in 2 tsp water.    Epigastric pain, Hiatal hernia       sulfamethoxazole-trimethoprim 800-160 MG per tablet    BACTRIM DS/SEPTRA DS    14 tablet    Take 1 tablet by mouth 2 times daily    UTI due to Klebsiella  species       * Notice:  This list has 4 medication(s) that are the same as other medications prescribed for you. Read the directions carefully, and ask your doctor or other care provider to review them with you.

## 2018-05-11 NOTE — TELEPHONE ENCOUNTER
Patient stopped in for blood pressure check.  Her blood pressure at last clinic visit was elevated.  She has been checking it at home and her numbers are mostly 150/90s.  She is accompanied by her , he states that she does have a history of heart disease and has 4 stents.  They are concerned with her ongoing high blood pressure readings.  Her blood pressure readings today were 155/88 and 141/87.    She takes lisinopril 5mg daily and toprol XL 25mg daily.  Any new prescription can be sent to [facility] pharmacy in [place].  We can call her cell phone with any changes in medication and follow up plan.  [phone]

## 2018-05-11 NOTE — TELEPHONE ENCOUNTER
Increase lisinopril to 10mg once daily new prescription for higher strnegth faxed to her paharmacy. Follow-up for RN blood pressure re-check in 2 weeks to make sure this dose is working for her.

## 2018-05-11 NOTE — NURSING NOTE
Patient stopped in for blood pressure check.  Her blood pressure at last clinic visit was elevated.  She has been checking it at home and her numbers are mostly 150/90s.  She is accompanied by her , he states that she does have a history of heart disease and has 4 stents.  They are concerned with her ongoing high blood pressure readings.  Her blood pressure readings today were 155/88 and 141/87.    She takes lisinopril 5mg daily and toprol XL 25mg daily.  Any new prescription can be sent to  pharmacy in [place].    Will route to PCP for review.

## 2018-05-16 ENCOUNTER — RADIANT APPOINTMENT (OUTPATIENT)
Dept: MAMMOGRAPHY | Facility: CLINIC | Age: 68
End: 2018-05-16
Attending: FAMILY MEDICINE
Payer: COMMERCIAL

## 2018-05-16 DIAGNOSIS — Z12.31 VISIT FOR SCREENING MAMMOGRAM: ICD-10-CM

## 2018-05-16 PROCEDURE — 77067 SCR MAMMO BI INCL CAD: CPT | Mod: TC

## 2018-05-21 ENCOUNTER — RADIANT APPOINTMENT (OUTPATIENT)
Dept: MAMMOGRAPHY | Facility: CLINIC | Age: 68
End: 2018-05-21
Attending: FAMILY MEDICINE
Payer: COMMERCIAL

## 2018-05-21 ENCOUNTER — RADIANT APPOINTMENT (OUTPATIENT)
Dept: ULTRASOUND IMAGING | Facility: CLINIC | Age: 68
End: 2018-05-21
Attending: FAMILY MEDICINE
Payer: COMMERCIAL

## 2018-05-21 DIAGNOSIS — R92.8 ABNORMAL MAMMOGRAM: ICD-10-CM

## 2018-05-21 PROCEDURE — G0279 TOMOSYNTHESIS, MAMMO: HCPCS | Performed by: RADIOLOGY

## 2018-05-21 PROCEDURE — 77065 DX MAMMO INCL CAD UNI: CPT | Performed by: RADIOLOGY

## 2018-05-21 PROCEDURE — 76642 ULTRASOUND BREAST LIMITED: CPT | Mod: LT | Performed by: RADIOLOGY

## 2018-06-06 ENCOUNTER — TELEPHONE (OUTPATIENT)
Dept: FAMILY MEDICINE | Facility: CLINIC | Age: 68
End: 2018-06-06

## 2018-06-06 NOTE — TELEPHONE ENCOUNTER
Please call.  Due for nurse BP re-check due to elevated BP at last visit and lisinopril increased.

## 2018-06-08 ENCOUNTER — ALLIED HEALTH/NURSE VISIT (OUTPATIENT)
Dept: FAMILY MEDICINE | Facility: CLINIC | Age: 68
End: 2018-06-08
Payer: COMMERCIAL

## 2018-06-08 ENCOUNTER — TELEPHONE (OUTPATIENT)
Dept: FAMILY MEDICINE | Facility: CLINIC | Age: 68
End: 2018-06-08

## 2018-06-08 VITALS
SYSTOLIC BLOOD PRESSURE: 133 MMHG | OXYGEN SATURATION: 94 % | DIASTOLIC BLOOD PRESSURE: 76 MMHG | RESPIRATION RATE: 18 BRPM | HEART RATE: 54 BPM

## 2018-06-08 DIAGNOSIS — R03.0 ELEVATED BLOOD PRESSURE READING WITHOUT DIAGNOSIS OF HYPERTENSION: Primary | ICD-10-CM

## 2018-06-08 PROCEDURE — 99207 ZZC NO CHARGE NURSE ONLY: CPT

## 2018-06-08 NOTE — MR AVS SNAPSHOT
After Visit Summary   6/8/2018    Anna Dahl    MRN: [id number]           Patient Information     Date Of Birth          1950        Visit Information        Provider Department      6/8/2018 11:00 AM FL CL RN [facility]        Today's Diagnoses     Elevated blood pressure reading without diagnosis of hypertension    -  1       Follow-ups after your visit        Who to contact     If you have questions or need follow up information about today's clinic visit or your schedule please contact [facility] directly at [phone].  Normal or non-critical lab and imaging results will be communicated to you by [employer], letter or phone within 4 business days after the clinic has received the results. If you do not hear from us within 7 days, please contact the clinic through CashEdget or phone. If you have a critical or abnormal lab result, we will notify you by phone as soon as possible.  Submit refill requests through T-Quad [employer] or call your pharmacy and they will forward the refill request to us. Please allow 3 business days for your refill to be completed.          Additional Information About Your Visit        MyChart Information     T-Qu[employer] gives you secure access to your electronic health record. If you see a primary care provider, you can also send messages to your care team and make appointments. If you have questions, please call your primary care clinic.  If you do not have a primary care provider, please call [phone] and they will assist you.        Care EveryWhere ID     This is your Care EveryWhere ID. This could be used by other organizations to access your [place] medical records  [id number]        Your Vitals Were     Pulse Respirations Pulse Oximetry             54 18 94%          Blood Pressure from Last 3 Encounters:   06/08/18 133/76   05/11/18 141/87   04/27/18 136/84    Weight from Last 3 Encounters:   04/27/18 215 lb (97.5 kg)    06/01/17 212 lb 11.9 oz (96.5 kg)   05/31/17 195 lb (88.5 kg)              Today, you had the following     No orders found for display       Primary Care Provider Office Phone # Fax #    Vivek Krause [phone] [address]  [facility] [address]        Equal Access to Services     [facility] : Hadii aad ku hadasho Soomaali, waaxda luqadaha, qaybta kaalmada adeegyada, waxay idiin hayaan adeeg kharash la'aan ah. So [facility] [phone].    ATENCIÓN: Si habla español, tiene a chavez disposición servicios gratuitos de asistencia lingüística. Llame al [phone].    We comply with applicable federal civil rights laws and [place] laws. We do not discriminate on the basis of race, color, national origin, age, disability, sex, sexual orientation, or gender identity.            Thank you!     Thank you for choosing [facility]  for your care. Our goal is always to provide you with excellent care. Hearing back from our patients is one way we can continue to improve our services. Please take a few minutes to complete the written survey that you may receive in the mail after your visit with us. Thank you!             Your Updated Medication List - Protect others around you: Learn how to safely use, store and throw away your medicines at www.disposemymeds.org.          This list is accurate as of 6/8/18 11:23 AM.  Always use your most recent med list.                   Brand Name Dispense Instructions for use Diagnosis    acetaminophen 325 MG tablet    TYLENOL    250 tablet    Take 2 tablets by mouth every 4 hours as needed.        albuterol 108 (90 Base) MCG/ACT Inhaler    PROAIR HFA/PROVENTIL HFA/VENTOLIN HFA    1 Inhaler    Inhale 2 puffs into the lungs every 4 hours as needed for shortness of breath / dyspnea    Mild intermittent asthma with exacerbation       alendronate 70 MG tablet    FOSAMAX    13 tablet    Take 1 tablet (70 mg) by mouth every 7 days Take with  over 8 ounces water and stay upright for at least 30 minutes after dose.  Take at least 60 minutes before breakfast.    Osteoporosis, unspecified osteoporosis type, unspecified pathological fracture presence       aspirin 325 MG EC tablet     40 tablet    Take 1 tablet (325 mg) by mouth daily    Status post total right knee replacement       atorvastatin 40 MG tablet    LIPITOR    90 tablet    Take 1 tablet (40 mg) by mouth daily    Coronary artery disease involving native coronary artery of native heart without angina pectoris       cyclobenzaprine 5 MG tablet    FLEXERIL    21 tablet    Take 1 tablet (5 mg) by mouth 3 times daily as needed for muscle spasms    Status post total right knee replacement       ferrous fumarate 65 mg ([place]. FE)-Vitamin C 125 mg  MG Tabs tablet    VITRON C    30 tablet    Take 1 tablet by mouth 2 times daily    Status post total right knee replacement       fluticasone 110 MCG/ACT Inhaler    FLOVENT HFA    3 Inhaler    Inhale 2 puffs into the lungs 2 times daily    Mild intermittent asthma with exacerbation       fluticasone 50 MCG/ACT spray    FLONASE    16 g    Spray 2 sprays into both nostrils daily    Chronic sinusitis, unspecified location       hydrOXYzine 25 MG tablet    ATARAX    30 tablet    Take 1-2 tablets (25-50 mg) by mouth every 4 hours as needed for itching    Status post total right knee replacement       lisinopril 10 MG tablet    PRINIVIL/ZESTRIL    30 tablet    Take 1 tablet (10 mg) by mouth daily    Coronary artery disease involving native coronary artery of native heart without angina pectoris       * LORazepam 1 MG tablet    ATIVAN    10 tablet    Take 0.5-1 tablets (0.5-1 mg) by mouth every 8 hours as needed for anxiety Take the night before a big event; may repeat in the AM if needed.    Anxiety       * LORazepam 1 MG tablet    ATIVAN    10 tablet    Take 1 tablet (1 mg) by mouth every 6 hours as needed for muscle spasms    Anxiety       metoprolol  succinate 25 MG 24 hr tablet    TOPROL XL    90 tablet    Take 1 tablet (25 mg) by mouth daily    Coronary artery disease involving native coronary artery of native heart without angina pectoris       montelukast 10 MG tablet    SINGULAIR    90 tablet    Take 1 tablet (10 mg) by mouth At Bedtime    Chronic rhinitis       nitroGLYcerin 0.4 MG sublingual tablet    NITROSTAT    25 tablet    Place 1 tablet (0.4 mg) under the tongue every 5 minutes as needed for chest pain    CAD (coronary artery disease)       omeprazole 40 MG capsule    priLOSEC    180 capsule    Take 1 capsule (40 mg) by mouth every 12 hours    Gastroesophageal reflux disease without esophagitis       * order for DME     1 Device    Equipment being ordered: immobilizer    Tibial plateau fracture, right, closed, initial encounter       * order for DME     1 Units    Equipment being ordered: Walker Wheels () Treatment Diagnosis: s/p TKA    Status post total right knee replacement       oxyCODONE IR 5 MG tablet    ROXICODONE    60 tablet    Take 1-2 tablets (5-10 mg) by mouth every 3 hours as needed for moderate to severe pain    Status post total right knee replacement       phenazopyridine 97.5 MG tablet    AZO    24 tablet    Take 2 tablets (195 mg) by mouth 3 times daily as needed for urinary tract discomfort (or bladder spasms; this drug will turn urine orange)    UTI due to Klebsiella species       senna-docusate 8.6-50 MG per tablet    SENOKOT-S;PERICOLACE    100 tablet    Take 1-2 tablets by mouth 2 times daily    Status post total right knee replacement       sertraline 25 MG tablet    ZOLOFT    90 tablet    Take 1 tablet (25 mg) by mouth daily    Anxiety       sucralfate 1 GM tablet    CARAFATE    360 tablet    Take 1 tablet (1 g) by mouth 4 times daily May dissolve in 2 tsp water.    Epigastric pain, Hiatal hernia       sulfamethoxazole-trimethoprim 800-160 MG per tablet    BACTRIM DS/SEPTRA DS    14 tablet    Take 1 tablet by mouth 2  times daily    UTI due to Klebsiella species       * Notice:  This list has 4 medication(s) that are the same as other medications prescribed for you. Read the directions carefully, and ask your doctor or other care provider to review them with you.

## 2018-06-08 NOTE — NURSING NOTE
Patient comes into the clinic today for a BP CK.  Medications and allergies are gone over with the patient and are up to date.  Jeanette VU RN

## 2018-06-08 NOTE — TELEPHONE ENCOUNTER
Dr. Krause,    Patient comes into the clinic today for a BP CK.  Medications and allergies are gone over with the patient and are up to date.  Jeanette VU RN    Vital Signs 6/8/2018 6/8/2018   Systolic 142 133   Diastolic 77 76   Pulse 54 54   Temperature     Respirations 18 18   Weight (LB)     Height     BMI (Calculated)     Pain     O2 95 94

## 2018-07-12 DIAGNOSIS — I25.10 CORONARY ARTERY DISEASE INVOLVING NATIVE CORONARY ARTERY OF NATIVE HEART WITHOUT ANGINA PECTORIS: ICD-10-CM

## 2018-07-12 NOTE — TELEPHONE ENCOUNTER
"Requested Prescriptions   Pending Prescriptions Disp Refills     lisinopril (PRINIVIL/ZESTRIL) 10 MG tablet  Last Written Prescription Date:  5/11/2018  Last Fill Quantity: 30,  # refills: 1   Last office visit: 4/27/2018 with prescribing provider:  Jimi   Future Office Visit:     30 tablet 1     Sig: Take 1 tablet (10 mg) by mouth daily    ACE Inhibitors (Including Combos) Protocol Passed    7/12/2018 11:52 AM       Passed - Blood pressure under 140/90 in past 12 months    BP Readings from Last 3 Encounters:   06/08/18 133/76   05/11/18 141/87   04/27/18 136/84                Passed - Recent (12 mo) or future (30 days) visit within the authorizing provider's specialty    Patient had office visit in the last 12 months or has a visit in the next 30 days with authorizing provider or within the authorizing provider's specialty.  See \"Patient Info\" tab in inbasket, or \"Choose Columns\" in Meds & Orders section of the refill encounter.           Passed - Patient is age 18 or older       Passed - No active pregnancy on record       Passed - Normal serum creatinine on file in past 12 months    Recent Labs   Lab Test  04/27/18   1353   CR  0.82            Passed - Normal serum potassium on file in past 12 months    Recent Labs   Lab Test  04/27/18   1353   POTASSIUM  4.4            Passed - No positive pregnancy test in past 12 months          "

## 2018-07-13 RX ORDER — LISINOPRIL 10 MG/1
10 TABLET ORAL DAILY
Qty: 90 TABLET | Refills: 1 | Status: SHIPPED | OUTPATIENT
Start: 2018-07-13 | End: 2019-01-25

## 2018-07-15 ENCOUNTER — HEALTH MAINTENANCE LETTER (OUTPATIENT)
Age: 68
End: 2018-07-15

## 2018-08-02 DIAGNOSIS — K21.9 GASTROESOPHAGEAL REFLUX DISEASE WITHOUT ESOPHAGITIS: ICD-10-CM

## 2018-08-02 RX ORDER — OMEPRAZOLE 40 MG/1
40 CAPSULE, DELAYED RELEASE ORAL EVERY 12 HOURS
Qty: 180 CAPSULE | Refills: 3 | Status: SHIPPED | OUTPATIENT
Start: 2018-08-02 | End: 2019-09-24

## 2018-08-02 NOTE — TELEPHONE ENCOUNTER
Routing refill request to provider for review/approval because:  Dx of osteoporosis on Problem List    Azra WOODS RN

## 2018-08-02 NOTE — TELEPHONE ENCOUNTER
"Requested Prescriptions   Pending Prescriptions Disp Refills     omeprazole (PRILOSEC) 40 MG capsule  Last Written Prescription Date:  6/8/2017  Last Fill Quantity: 180,  # refills: 3   Last office visit: 4/27/2018 with prescribing provider:  Post   Future Office Visit:     180 capsule 3     Sig: Take 1 capsule (40 mg) by mouth every 12 hours    PPI Protocol Failed    8/2/2018 10:27 AM       Failed - No diagnosis of osteoporosis on record       Passed - Not on Clopidogrel (unless Pantoprazole ordered)       Passed - Recent (12 mo) or future (30 days) visit within the authorizing provider's specialty    Patient had office visit in the last 12 months or has a visit in the next 30 days with authorizing provider or within the authorizing provider's specialty.  See \"Patient Info\" tab in inbasket, or \"Choose Columns\" in Meds & Orders section of the refill encounter.           Passed - Patient is age 18 or older       Passed - No active pregnacy on record       Passed - No positive pregnancy test in past 12 months          "

## 2018-08-07 DIAGNOSIS — J31.0 CHRONIC RHINITIS: ICD-10-CM

## 2018-08-07 RX ORDER — MONTELUKAST SODIUM 10 MG/1
10 TABLET ORAL AT BEDTIME
Qty: 90 TABLET | Refills: 1 | Status: SHIPPED | OUTPATIENT
Start: 2018-08-07 | End: 2019-04-24

## 2018-08-07 NOTE — TELEPHONE ENCOUNTER
"Requested Prescriptions   Pending Prescriptions Disp Refills     montelukast (SINGULAIR) 10 MG tablet  Last Written Prescription Date:  6/8/2017  Last Fill Quantity: 90,  # refills: 3   Last office visit: 6/8/2018 with prescribing provider:  Erlinda    Future Office Visit:     90 tablet 3     Sig: Take 1 tablet (10 mg) by mouth At Bedtime    Leukotriene Inhibitors Protocol Passed    8/7/2018 10:57 AM       Passed - Patient is age 12 or older    If patient is under 16, ok to refill using age based dosing.          Passed - Asthma control assessment score within normal limits in last 6 months    Please review ACT score.          Passed - Recent (6 mo) or future (30 days) visit within the authorizing provider's specialty    Patient had office visit in the last 6 months or has a visit in the next 30 days with authorizing provider or within the authorizing provider's specialty.  See \"Patient Info\" tab in inbasket, or \"Choose Columns\" in Meds & Orders section of the refill encounter.              "

## 2018-11-08 ENCOUNTER — TELEPHONE (OUTPATIENT)
Dept: FAMILY MEDICINE | Facility: CLINIC | Age: 68
End: 2018-11-08

## 2018-11-08 DIAGNOSIS — F41.9 ANXIETY: ICD-10-CM

## 2018-11-08 RX ORDER — LORAZEPAM 1 MG/1
0.5-1 TABLET ORAL EVERY 8 HOURS PRN
Qty: 10 TABLET | Refills: 1 | Status: SHIPPED | OUTPATIENT
Start: 2018-11-08 | End: 2019-07-17

## 2018-11-08 NOTE — TELEPHONE ENCOUNTER
Routing refill request to provider for review/approval because:  Drug not on the [facility] refill protocol     She is taking for anxiety.    Xiomara JENKINS RN

## 2018-11-08 NOTE — TELEPHONE ENCOUNTER
Requested Prescriptions   Pending Prescriptions Disp Refills     LORazepam (ATIVAN) 1 MG tablet 10 tablet 1     Sig: Take 0.5-1 tablets (0.5-1 mg) by mouth every 8 hours as needed for anxiety Take the night before a big event; may repeat in the AM if needed.    Last Written Prescription Date:  4/27/2018  Last Fill Quantity: 10,   # refills: 0  Last Office Visit: 4/27/2018 with Jimi   Future Office visit:       Routing refill request to provider for review/approval because:  Drug not on the [facility], [facility] or [facility] refill protocol or controlled substance

## 2018-12-03 ENCOUNTER — ALLIED HEALTH/NURSE VISIT (OUTPATIENT)
Dept: FAMILY MEDICINE | Facility: CLINIC | Age: 68
End: 2018-12-03
Payer: COMMERCIAL

## 2018-12-03 DIAGNOSIS — Z23 NEED FOR PROPHYLACTIC VACCINATION AND INOCULATION AGAINST INFLUENZA: Primary | ICD-10-CM

## 2018-12-03 PROCEDURE — 90662 IIV NO PRSV INCREASED AG IM: CPT

## 2018-12-03 PROCEDURE — G0008 ADMIN INFLUENZA VIRUS VAC: HCPCS

## 2018-12-03 NOTE — NURSING NOTE
Prior to injection verified patient identity using patient's name and date of birth.  Due to injection administration, patient instructed to remain in clinic for 15 minutes  afterwards, and to report any adverse reaction to me immediately.    Debora Burnette CMA

## 2018-12-03 NOTE — MR AVS SNAPSHOT
After Visit Summary   12/3/2018    Anna Dahl    MRN: [id number]           Patient Information     Date Of Birth          1950        Visit Information        Provider Department      12/3/2018 10:45 AM Nena/Brenda Villarreal [facility]        Today's Diagnoses     Need for prophylactic vaccination and inoculation against influenza    -  1       Follow-ups after your visit        Who to contact     If you have questions or need follow up information about today's clinic visit or your schedule please contact [facility] directly at [phone].  Normal or non-critical lab and imaging results will be communicated to you by [employer], letter or phone within 4 business days after the clinic has received the results. If you do not hear from us within 7 days, please contact the clinic through EarlyDo[employer] or phone. If you have a critical or abnormal lab result, we will notify you by phone as soon as possible.  Submit refill requests through eTipping or call your pharmacy and they will forward the refill request to us. Please allow 3 business days for your refill to be completed.          Additional Information About Your Visit        MyChart Information     eTipping gives you secure access to your electronic health record. If you see a primary care provider, you can also send messages to your care team and make appointments. If you have questions, please call your primary care clinic.  If you do not have a primary care provider, please call [phone] and they will assist you.        Care EveryWhere ID     This is your Care EveryWhere ID. This could be used by other organizations to access your [place] medical records  [id number]         Blood Pressure from Last 3 Encounters:   06/08/18 133/76   05/11/18 141/87   04/27/18 136/84    Weight from Last 3 Encounters:   04/27/18 215 lb (97.5 kg)   06/01/17 212 lb 11.9 oz (96.5 kg)   05/31/17 195 lb (88.5 kg)              We  Performed the Following     ADMIN INFLUENZA (For MEDICARE Patients ONLY) []     FLU VACCINE, INCREASED ANTIGEN, PRESV FREE, AGE 65+ [52711]        Primary Care Provider Office Phone # Fax #    Melaniaruy Irais Krause [phone] [address]  [facility] [address]        Equal Access to Services     ROXANAALEXIS SUMMER : Hadii aad ku hadasho Soomaali, waaxda luqadaha, qaybta kaalmada adeegyada, waxay idiin hayaan adeeg khirenesh laRobinaan . So [facility] [phone].    ATENCIÓN: Si habla español, tiene a chavez disposición servicios gratuitos de asistencia lingüística. Llame al [phone].    We comply with applicable federal civil rights laws and [place] laws. We do not discriminate on the basis of race, color, national origin, age, disability, sex, sexual orientation, or gender identity.            Thank you!     Thank you for choosing [facility]  for your care. Our goal is always to provide you with excellent care. Hearing back from our patients is one way we can continue to improve our services. Please take a few minutes to complete the written survey that you may receive in the mail after your visit with us. Thank you!             Your Updated Medication List - Protect others around you: Learn how to safely use, store and throw away your medicines at www.disposemymeds.org.          This list is accurate as of 12/3/18 10:54 AM.  Always use your most recent med list.                   Brand Name Dispense Instructions for use Diagnosis    acetaminophen 325 MG tablet    TYLENOL    250 tablet    Take 2 tablets by mouth every 4 hours as needed.        albuterol 108 (90 Base) MCG/ACT inhaler    PROAIR HFA/PROVENTIL HFA/VENTOLIN HFA    1 Inhaler    Inhale 2 puffs into the lungs every 4 hours as needed for shortness of breath / dyspnea    Mild intermittent asthma with exacerbation       alendronate 70 MG tablet    FOSAMAX    13 tablet    Take 1 tablet (70 mg) by mouth every 7 days  Take with over 8 ounces water and stay upright for at least 30 minutes after dose.  Take at least 60 minutes before breakfast.    Osteoporosis, unspecified osteoporosis type, unspecified pathological fracture presence       aspirin 325 MG EC tablet    ASA    40 tablet    Take 1 tablet (325 mg) by mouth daily    Status post total right knee replacement       atorvastatin 40 MG tablet    LIPITOR    90 tablet    Take 1 tablet (40 mg) by mouth daily    Coronary artery disease involving native coronary artery of native heart without angina pectoris       cyclobenzaprine 5 MG tablet    FLEXERIL    21 tablet    Take 1 tablet (5 mg) by mouth 3 times daily as needed for muscle spasms    Status post total right knee replacement       ferrous fumarate 65 mg ([place]. FE)-Vitamin C 125 mg  MG Tabs tablet    VITRON C    30 tablet    Take 1 tablet by mouth 2 times daily    Status post total right knee replacement       fluticasone 110 MCG/ACT inhaler    FLOVENT HFA    3 Inhaler    Inhale 2 puffs into the lungs 2 times daily    Mild intermittent asthma with exacerbation       fluticasone 50 MCG/ACT nasal spray    FLONASE    16 g    Spray 2 sprays into both nostrils daily    Chronic sinusitis, unspecified location       hydrOXYzine 25 MG tablet    ATARAX    30 tablet    Take 1-2 tablets (25-50 mg) by mouth every 4 hours as needed for itching    Status post total right knee replacement       lisinopril 10 MG tablet    PRINIVIL/ZESTRIL    90 tablet    Take 1 tablet (10 mg) by mouth daily    Coronary artery disease involving native coronary artery of native heart without angina pectoris       * LORazepam 1 MG tablet    ATIVAN    10 tablet    Take 1 tablet (1 mg) by mouth every 6 hours as needed for muscle spasms    Anxiety       * LORazepam 1 MG tablet    ATIVAN    10 tablet    Take 0.5-1 tablets (0.5-1 mg) by mouth every 8 hours as needed for anxiety Take the night before a big event; may repeat in the AM if needed.    Anxiety        metoprolol succinate ER 25 MG 24 hr tablet    TOPROL XL    90 tablet    Take 1 tablet (25 mg) by mouth daily    Coronary artery disease involving native coronary artery of native heart without angina pectoris       montelukast 10 MG tablet    SINGULAIR    90 tablet    Take 1 tablet (10 mg) by mouth At Bedtime    Chronic rhinitis       nitroGLYcerin 0.4 MG sublingual tablet    NITROSTAT    25 tablet    Place 1 tablet (0.4 mg) under the tongue every 5 minutes as needed for chest pain    CAD (coronary artery disease)       omeprazole 40 MG DR capsule    priLOSEC    180 capsule    Take 1 capsule (40 mg) by mouth every 12 hours    Gastroesophageal reflux disease without esophagitis       * order for DME     1 Device    Equipment being ordered: immobilizer    Tibial plateau fracture, right, closed, initial encounter       * order for DME     1 Units    Equipment being ordered: Walker Wheels () Treatment Diagnosis: s/p TKA    Status post total right knee replacement       oxyCODONE 5 MG tablet    ROXICODONE    60 tablet    Take 1-2 tablets (5-10 mg) by mouth every 3 hours as needed for moderate to severe pain    Status post total right knee replacement       phenazopyridine 97.5 MG tablet    AZO    24 tablet    Take 2 tablets (195 mg) by mouth 3 times daily as needed for urinary tract discomfort (or bladder spasms; this drug will turn urine orange)    UTI due to Klebsiella species       senna-docusate 8.6-50 MG tablet    SENOKOT-S/PERICOLACE    100 tablet    Take 1-2 tablets by mouth 2 times daily    Status post total right knee replacement       sertraline 25 MG tablet    ZOLOFT    90 tablet    Take 1 tablet (25 mg) by mouth daily    Anxiety       sucralfate 1 GM tablet    CARAFATE    360 tablet    Take 1 tablet (1 g) by mouth 4 times daily May dissolve in 2 tsp water.    Epigastric pain, Hiatal hernia       sulfamethoxazole-trimethoprim 800-160 MG tablet    BACTRIM DS/SEPTRA DS    14 tablet    Take 1 tablet by  mouth 2 times daily    UTI due to Klebsiella species       * Notice:  This list has 4 medication(s) that are the same as other medications prescribed for you. Read the directions carefully, and ask your doctor or other care provider to review them with you.

## 2019-01-25 ENCOUNTER — TELEPHONE (OUTPATIENT)
Dept: FAMILY MEDICINE | Facility: CLINIC | Age: 69
End: 2019-01-25

## 2019-01-25 DIAGNOSIS — I25.10 CORONARY ARTERY DISEASE INVOLVING NATIVE CORONARY ARTERY OF NATIVE HEART WITHOUT ANGINA PECTORIS: ICD-10-CM

## 2019-01-25 RX ORDER — LISINOPRIL 10 MG/1
10 TABLET ORAL DAILY
Qty: 90 TABLET | Refills: 0 | Status: SHIPPED | OUTPATIENT
Start: 2019-01-25 | End: 2019-04-24

## 2019-01-25 NOTE — TELEPHONE ENCOUNTER
lisinopril  Last Written Prescription Date:  7/13/2018  Last Fill Quantity: 90,  # refills: 0   Last office visit: 12/3/2018 with prescribing provider:     Future Office Visit:       Leaving for vacation on Tuesday.     Pearl KELLEY  Station

## 2019-01-30 ENCOUNTER — TELEPHONE (OUTPATIENT)
Dept: FAMILY MEDICINE | Facility: CLINIC | Age: 69
End: 2019-01-30

## 2019-01-30 NOTE — TELEPHONE ENCOUNTER
Fax from [facility] - [facility]  Drug: Lorazepam 1 mg tablet  Date filled: 1/29/19    This drug is on formulary but requires a prior authorization.   Please write a prescription for an alternative drug or  Request a coverage review if special circumstances require that your patient continue on this medication.  To request coverage review call [facility] [phone]    Tanya Cruz  CSS

## 2019-02-05 ENCOUNTER — TELEPHONE (OUTPATIENT)
Dept: FAMILY MEDICINE | Facility: CLINIC | Age: 69
End: 2019-02-05

## 2019-02-05 DIAGNOSIS — J45.21 MILD INTERMITTENT ASTHMA WITH EXACERBATION: Primary | Chronic | ICD-10-CM

## 2019-02-05 NOTE — TELEPHONE ENCOUNTER
Reason for Call:  Other med request    Detailed comments:  states that the Flovent is not covered by insurance. Q-Shade or Asmanex are covered.  Change med or Prior Auth?    Phone Number Patient can be reached at: Home number on file [phone] (home)    Best Time: any    Can we leave a detailed message on this number? YES    Call taken on 2/5/2019 at 12:35 PM by Nadia Louise

## 2019-04-03 ENCOUNTER — TELEPHONE (OUTPATIENT)
Dept: FAMILY MEDICINE | Facility: CLINIC | Age: 69
End: 2019-04-03

## 2019-04-03 NOTE — TELEPHONE ENCOUNTER
Per fax received from [facility]   - Flovent is not covered by patient's Insurance Company  Dr. Krause - Please choose:  1.  Change medication that is not covered to a different medication and send new prescription to patient's pharmacy?  2.  Patient will need to pay for the non-covered medication out-of-pocket?   3.  Try for Prior Authorization with Insurance Company to get medication covered?       Key# [id number]

## 2019-04-05 NOTE — TELEPHONE ENCOUNTER
Prior Authorization Retail Medication Request    Medication/Dose: Flovent  ICD code (if different than what is on RX):    Previously Tried and Failed:  Advair; proair; proventil; ventolin; albuterol; albuterol sulfate; qvar; singulair; ipratropium-albuterol; arnuity ellipta; racepinephrine neb  Rationale:  Patient has used since 2011    Insurance Name:  Not provided  Insurance ID:  Not provided  Key# [id number]        Pharmacy Information (if different than what is on RX)  Name:    Phone:

## 2019-04-05 NOTE — TELEPHONE ENCOUNTER
Sorry, we don't have any insurance coverage listed.  Request sent to [facility] PA MANUELA.

## 2019-04-09 NOTE — TELEPHONE ENCOUNTER
This PA request was submitted to the insurance by the Central Prior Authorization Team.  If you have any questions in regards to this request, we can be reached at [phone].     Thanks    PA Initiation    Medication: fluticasone (FLOVENT HFA) 110 MCG/ACT Inhaler  Insurance Company: [employer] - Phone [phone] Fax [phone]  Pharmacy Filling the Rx: [facility] - - [place]REW CORRIGAN - [id number] [facility]  Filling Pharmacy Phone: [phone]  Filling Pharmacy Fax:    Start Date: 4/9/2019

## 2019-04-11 NOTE — TELEPHONE ENCOUNTER
Prior Authorization Approval    Authorization Effective Date: 3/10/2019  Authorization Expiration Date: 4/8/2020  Medication: fluticasone (FLOVENT HFA) 110 MCG/ACT Inhaler - APPROVED   Approved Dose/Quantity:   Reference #:     Insurance Company: [employer] - Phone [phone] Fax [phone]  Expected CoPay:       CoPay Card Available:      Foundation Assistance Needed:    Which Pharmacy is filling the prescription (Not needed for infusion/clinic administered): [facility] - - [place] CORRIGAN - [id number] [facility]  Pharmacy Notified: Yes  Patient Notified: Yes

## 2019-04-15 DIAGNOSIS — I25.10 CORONARY ARTERY DISEASE INVOLVING NATIVE CORONARY ARTERY OF NATIVE HEART WITHOUT ANGINA PECTORIS: ICD-10-CM

## 2019-04-15 NOTE — TELEPHONE ENCOUNTER
"Requested Prescriptions   Pending Prescriptions Disp Refills     atorvastatin (LIPITOR) 40 MG tablet 90 tablet 3     Sig: Take 1 tablet (40 mg) by mouth daily  Last Written Prescription Date:  4/27/2018  Last Fill Quantity: 90,  # refills: 3   Last office visit: 12/3/2018 with prescribing provider:  Stanley    Future Office Visit:   Next 5 appointments (look out 90 days)    Apr 24, 2019 10:20 AM CDT  SHORT with Vivek Krause MD  [facility] ([facility]) [address] [facility] [address]  [phone]                Statins Protocol Passed - 4/15/2019 10:18 AM        Passed - LDL on file in past 12 months     Recent Labs   Lab Test 04/27/18  1353   *             Passed - No abnormal creatine kinase in past 12 months     Recent Labs   Lab Test 06/17/17  0700   CKT 69                Passed - Recent (12 mo) or future (30 days) visit within the authorizing provider's specialty     Patient had office visit in the last 12 months or has a visit in the next 30 days with authorizing provider or within the authorizing provider's specialty.  See \"Patient Info\" tab in inbasket, or \"Choose Columns\" in Meds & Orders section of the refill encounter.              Passed - Medication is active on med list        Passed - Patient is age 18 or older        Passed - No active pregnancy on record        Passed - No positive pregnancy test in past 12 months        metoprolol succinate ER (TOPROL XL) 25 MG 24 hr tablet 90 tablet 3     Sig: Take 1 tablet (25 mg) by mouth daily  Last Written Prescription Date:  4/27/2018  Last Fill Quantity: 90,  # refills: 3   Last office visit: 12/3/2018 with prescribing provider:  Stanley    Future Office Visit:   Next 5 appointments (look out 90 days)    Apr 24, 2019 10:20 AM CDT  SHORT with Vivek Krause MD  [facility] ([facility]) [address] [place]  [facility] " "[id number]  [phone]                Beta-Blockers Protocol Passed - 4/15/2019 10:18 AM        Passed - Blood pressure under 140/90 in past 12 months     BP Readings from Last 3 Encounters:   06/08/18 133/76   05/11/18 141/87   04/27/18 136/84                 Passed - Patient is age 6 or older        Passed - Recent (12 mo) or future (30 days) visit within the authorizing provider's specialty     Patient had office visit in the last 12 months or has a visit in the next 30 days with authorizing provider or within the authorizing provider's specialty.  See \"Patient Info\" tab in inbasket, or \"Choose Columns\" in Meds & Orders section of the refill encounter.              Passed - Medication is active on med list          "

## 2019-04-16 RX ORDER — METOPROLOL SUCCINATE 25 MG/1
25 TABLET, EXTENDED RELEASE ORAL DAILY
Qty: 90 TABLET | Refills: 3 | OUTPATIENT
Start: 2019-04-16

## 2019-04-16 RX ORDER — ATORVASTATIN CALCIUM 40 MG/1
40 TABLET, FILM COATED ORAL DAILY
Qty: 90 TABLET | Refills: 3 | OUTPATIENT
Start: 2019-04-16

## 2019-04-24 ENCOUNTER — OFFICE VISIT (OUTPATIENT)
Dept: FAMILY MEDICINE | Facility: CLINIC | Age: 69
End: 2019-04-24
Payer: COMMERCIAL

## 2019-04-24 VITALS
HEIGHT: 65 IN | SYSTOLIC BLOOD PRESSURE: 132 MMHG | TEMPERATURE: 97.9 F | OXYGEN SATURATION: 94 % | RESPIRATION RATE: 16 BRPM | WEIGHT: 211.6 LBS | BODY MASS INDEX: 35.25 KG/M2 | DIASTOLIC BLOOD PRESSURE: 80 MMHG | HEART RATE: 64 BPM

## 2019-04-24 DIAGNOSIS — I25.119 CORONARY ARTERY DISEASE INVOLVING NATIVE HEART WITH ANGINA PECTORIS, UNSPECIFIED VESSEL OR LESION TYPE (H): Primary | ICD-10-CM

## 2019-04-24 DIAGNOSIS — E66.01 MORBID OBESITY (H): ICD-10-CM

## 2019-04-24 DIAGNOSIS — M81.0 OSTEOPOROSIS, UNSPECIFIED OSTEOPOROSIS TYPE, UNSPECIFIED PATHOLOGICAL FRACTURE PRESENCE: ICD-10-CM

## 2019-04-24 DIAGNOSIS — F41.9 ANXIETY: ICD-10-CM

## 2019-04-24 DIAGNOSIS — Z12.11 SPECIAL SCREENING FOR MALIGNANT NEOPLASMS, COLON: ICD-10-CM

## 2019-04-24 DIAGNOSIS — J45.21 MILD INTERMITTENT ASTHMA WITH EXACERBATION: ICD-10-CM

## 2019-04-24 DIAGNOSIS — J31.0 CHRONIC RHINITIS: ICD-10-CM

## 2019-04-24 LAB
ALBUMIN SERPL-MCNC: 3.7 G/DL (ref 3.4–5)
ALP SERPL-CCNC: 114 U/L (ref 40–150)
ALT SERPL W P-5'-P-CCNC: 20 U/L (ref 0–50)
ANION GAP SERPL CALCULATED.3IONS-SCNC: 4 MMOL/L (ref 3–14)
AST SERPL W P-5'-P-CCNC: 14 U/L (ref 0–45)
BILIRUB SERPL-MCNC: 0.3 MG/DL (ref 0.2–1.3)
BUN SERPL-MCNC: 16 MG/DL (ref 7–30)
CALCIUM SERPL-MCNC: 8.9 MG/DL (ref 8.5–10.1)
CHLORIDE SERPL-SCNC: 106 MMOL/L (ref 94–109)
CHOLEST SERPL-MCNC: 171 MG/DL
CO2 SERPL-SCNC: 27 MMOL/L (ref 20–32)
CREAT SERPL-MCNC: 0.82 MG/DL (ref 0.52–1.04)
GFR SERPL CREATININE-BSD FRML MDRD: 73 ML/MIN/{1.73_M2}
GLUCOSE SERPL-MCNC: 93 MG/DL (ref 70–99)
HDLC SERPL-MCNC: 46 MG/DL
LDLC SERPL CALC-MCNC: 103 MG/DL
NONHDLC SERPL-MCNC: 125 MG/DL
POTASSIUM SERPL-SCNC: 4.2 MMOL/L (ref 3.4–5.3)
PROT SERPL-MCNC: 7.5 G/DL (ref 6.8–8.8)
SODIUM SERPL-SCNC: 137 MMOL/L (ref 133–144)
TRIGL SERPL-MCNC: 110 MG/DL
TSH SERPL DL<=0.005 MIU/L-ACNC: 0.74 MU/L (ref 0.4–4)

## 2019-04-24 PROCEDURE — 80053 COMPREHEN METABOLIC PANEL: CPT | Performed by: FAMILY MEDICINE

## 2019-04-24 PROCEDURE — 99214 OFFICE O/P EST MOD 30 MIN: CPT | Performed by: FAMILY MEDICINE

## 2019-04-24 PROCEDURE — 36415 COLL VENOUS BLD VENIPUNCTURE: CPT | Performed by: FAMILY MEDICINE

## 2019-04-24 PROCEDURE — 80061 LIPID PANEL: CPT | Performed by: FAMILY MEDICINE

## 2019-04-24 PROCEDURE — 84443 ASSAY THYROID STIM HORMONE: CPT | Performed by: FAMILY MEDICINE

## 2019-04-24 RX ORDER — FLUTICASONE PROPIONATE 110 UG/1
2 AEROSOL, METERED RESPIRATORY (INHALATION) 2 TIMES DAILY
Qty: 3 INHALER | Refills: 3 | Status: SHIPPED | OUTPATIENT
Start: 2019-04-24 | End: 2020-04-16

## 2019-04-24 RX ORDER — METOPROLOL SUCCINATE 25 MG/1
25 TABLET, EXTENDED RELEASE ORAL DAILY
Qty: 90 TABLET | Refills: 3 | Status: SHIPPED | OUTPATIENT
Start: 2019-04-24 | End: 2020-04-16

## 2019-04-24 RX ORDER — ATORVASTATIN CALCIUM 40 MG/1
40 TABLET, FILM COATED ORAL DAILY
Qty: 90 TABLET | Refills: 3 | Status: SHIPPED | OUTPATIENT
Start: 2019-04-24 | End: 2020-04-16

## 2019-04-24 RX ORDER — NITROGLYCERIN 0.4 MG/1
0.4 TABLET SUBLINGUAL EVERY 5 MIN PRN
Qty: 25 TABLET | Refills: 3 | Status: SHIPPED | OUTPATIENT
Start: 2019-04-24

## 2019-04-24 RX ORDER — FLUTICASONE PROPIONATE 110 UG/1
2 AEROSOL, METERED RESPIRATORY (INHALATION) 2 TIMES DAILY
COMMUNITY
End: 2019-05-20

## 2019-04-24 RX ORDER — SERTRALINE HYDROCHLORIDE 25 MG/1
25 TABLET, FILM COATED ORAL DAILY
Qty: 90 TABLET | Refills: 3 | Status: SHIPPED | OUTPATIENT
Start: 2019-04-24 | End: 2020-04-16

## 2019-04-24 RX ORDER — ALBUTEROL SULFATE 90 UG/1
2 AEROSOL, METERED RESPIRATORY (INHALATION) EVERY 4 HOURS PRN
Qty: 18 G | Refills: 3 | Status: SHIPPED | OUTPATIENT
Start: 2019-04-24 | End: 2020-04-16

## 2019-04-24 RX ORDER — LISINOPRIL 10 MG/1
10 TABLET ORAL DAILY
Qty: 90 TABLET | Refills: 3 | Status: SHIPPED | OUTPATIENT
Start: 2019-04-24 | End: 2020-04-16

## 2019-04-24 RX ORDER — ALENDRONATE SODIUM 70 MG/1
70 TABLET ORAL
Qty: 13 TABLET | Refills: 3 | Status: SHIPPED | OUTPATIENT
Start: 2019-04-24 | End: 2020-04-16

## 2019-04-24 RX ORDER — FLUTICASONE PROPIONATE 50 MCG
2 SPRAY, SUSPENSION (ML) NASAL DAILY
Qty: 16 G | Status: SHIPPED | OUTPATIENT
Start: 2019-04-24 | End: 2020-03-30

## 2019-04-24 RX ORDER — MONTELUKAST SODIUM 10 MG/1
10 TABLET ORAL AT BEDTIME
Qty: 90 TABLET | Refills: 3 | Status: SHIPPED | OUTPATIENT
Start: 2019-04-24 | End: 2020-01-29

## 2019-04-24 ASSESSMENT — ANXIETY QUESTIONNAIRES
6. BECOMING EASILY ANNOYED OR IRRITABLE: NOT AT ALL
2. NOT BEING ABLE TO STOP OR CONTROL WORRYING: NOT AT ALL
3. WORRYING TOO MUCH ABOUT DIFFERENT THINGS: NOT AT ALL
GAD7 TOTAL SCORE: 1
7. FEELING AFRAID AS IF SOMETHING AWFUL MIGHT HAPPEN: NOT AT ALL
5. BEING SO RESTLESS THAT IT IS HARD TO SIT STILL: NOT AT ALL
1. FEELING NERVOUS, ANXIOUS, OR ON EDGE: SEVERAL DAYS

## 2019-04-24 ASSESSMENT — PATIENT HEALTH QUESTIONNAIRE - PHQ9
5. POOR APPETITE OR OVEREATING: NOT AT ALL
SUM OF ALL RESPONSES TO PHQ QUESTIONS 1-9: 0

## 2019-04-24 ASSESSMENT — PAIN SCALES - GENERAL: PAINLEVEL: NO PAIN (0)

## 2019-04-24 ASSESSMENT — MIFFLIN-ST. JEOR: SCORE: 1477.75

## 2019-04-24 NOTE — PATIENT INSTRUCTIONS
Our Clinic hours are:  Mondays    7:20 am - 7 pm  Tues -  Fri  7:20 am - 5 pm    Clinic Phone: [phone]    The clinic lab opens at 7:30 am Mon - Fri and appointments are required.    [facility]. [phone]  Monday  8 am - 7pm  Tues - Fri 8 am - 5:30 pm

## 2019-04-24 NOTE — PROGRESS NOTES
SUBJECTIVE:   Anna Dahl is a 68 year old female who presents to clinic today for the following   health issues:      Hypertension Follow-up      Outpatient blood pressures are being checked at home.  Results are 120/70.    Low Salt Diet: not monitoring salt      Asthma Follow-Up    Was ACT completed today?    Yes    ACT Total Scores 4/24/2019   ACT TOTAL SCORE -   ASTHMA ER VISITS -   ASTHMA HOSPITALIZATIONS -   ACT TOTAL SCORE (Goal Greater than or Equal to 20) 25   In the past 12 months, how many times did you visit the emergency room for your asthma without being admitted to the hospital? 0   In the past 12 months, how many times were you hospitalized overnight because of your asthma? 0       Recent asthma triggers that patient is dealing with: None        Amount of exercise or physical activity: None    Problems taking medications regularly: No    Medication side effects: none    Diet: regular (no restrictions)    ADDITIONAL HPI: 68 year old female here for above issue.  She also has a history of NTSEMI. Has NTG at home but has not had any angina episodes in the last year.    ROS: 10 point review of systems negative except as per HPI.    PAST MEDICAL HISTORY:  Past Medical History:   Diagnosis Date     Asthma      Basal cell carcinoma      Calculus of kidney      Gastro-oesophageal reflux disease      Renal colic      Unspecified asthma(493.90)      Unspecified nasal polyp     Nasal polyps     Unspecified otitis media      Unspecified sinusitis (chronic)     Chronic sinusitis        ACTIVE MEDICAL PROBLEMS:  Patient Active Problem List   Diagnosis     Nasal polyp     Hiatal hernia     Colon polyps     GERD (gastroesophageal reflux disease)     Hyperlipidemia LDL goal <100     Advanced directives, counseling/discussion     Mild intermittent asthma with exacerbation     Chronic sinusitis     Non-ST elevation myocardial infarction (NSTEMI), initial care episode (H)     CAD (coronary artery disease)     BCC  (basal cell carcinoma), face     Anxiety     Osteoporosis     S/P total knee arthroplasty     Elevated glucose     Acute kidney failure (H)     Postoperative anemia     Health Care Home        FAMILY HISTORY:  Family History   Problem Relation Age of Onset     Heart Disease Father         heart surgery     Lipids Father      Alzheimer Disease Mother      Lipids Brother      Lipids Brother        SOCIAL HISTORY:  Social History     Socioeconomic History     Marital status:      Spouse name: Not on file     Number of children: Not on file     Years of education: Not on file     Highest education level: Not on file   Occupational History     Not on file   Social Needs     Financial resource strain: Not on file     Food insecurity:     Worry: Not on file     Inability: Not on file     Transportation needs:     Medical: Not on file     Non-medical: Not on file   Tobacco Use     Smoking status: Never Smoker     Smokeless tobacco: Never Used   Substance and Sexual Activity     Alcohol use: Yes     Comment: moderate couple drinks on weekends     Drug use: No     Sexual activity: Yes     Partners: Male   Lifestyle     Physical activity:     Days per week: Not on file     Minutes per session: Not on file     Stress: Not on file   Relationships     Social connections:     Talks on phone: Not on file     Gets together: Not on file     Attends Denominational service: Not on file     Active member of club or organization: Not on file     Attends meetings of clubs or organizations: Not on file     Relationship status: Not on file     Intimate partner violence:     Fear of current or ex partner: Not on file     Emotionally abused: Not on file     Physically abused: Not on file     Forced sexual activity: Not on file   Other Topics Concern     Parent/sibling w/ CABG, MI or angioplasty before 65F 55M? No   Social History Narrative     Not on file       MEDICATIONS:  Current Outpatient Medications   Medication Sig Dispense Refill      acetaminophen (TYLENOL) 325 MG tablet Take 2 tablets by mouth every 4 hours as needed. 250 tablet      albuterol (PROAIR HFA/PROVENTIL HFA/VENTOLIN HFA) 108 (90 Base) MCG/ACT inhaler Inhale 2 puffs into the lungs every 4 hours as needed for shortness of breath / dyspnea 18 g 3     alendronate (FOSAMAX) 70 MG tablet Take 1 tablet (70 mg) by mouth every 7 days Take with over 8 ounces water and stay upright for at least 30 minutes after dose.  Take at least 60 minutes before breakfast. 13 tablet 3     aspirin  MG EC tablet Take 1 tablet (325 mg) by mouth daily 40 tablet 0     atorvastatin (LIPITOR) 40 MG tablet Take 1 tablet (40 mg) by mouth daily 90 tablet 3     fluticasone (FLONASE) 50 MCG/ACT nasal spray Spray 2 sprays into both nostrils daily 16 g prn     fluticasone (FLOVENT HFA) 110 MCG/ACT inhaler Inhale 2 puffs into the lungs 2 times daily       fluticasone (FLOVENT HFA) 110 MCG/ACT inhaler Inhale 2 puffs into the lungs 2 times daily 3 Inhaler 3     lisinopril (PRINIVIL/ZESTRIL) 10 MG tablet Take 1 tablet (10 mg) by mouth daily 90 tablet 3     LORazepam (ATIVAN) 1 MG tablet Take 0.5-1 tablets (0.5-1 mg) by mouth every 8 hours as needed for anxiety Take the night before a big event; may repeat in the AM if needed. 10 tablet 1     LORazepam (ATIVAN) 1 MG tablet Take 1 tablet (1 mg) by mouth every 6 hours as needed for muscle spasms 10 tablet 0     metoprolol succinate ER (TOPROL XL) 25 MG 24 hr tablet Take 1 tablet (25 mg) by mouth daily 90 tablet 3     montelukast (SINGULAIR) 10 MG tablet Take 1 tablet (10 mg) by mouth At Bedtime 90 tablet 3     nitroGLYcerin (NITROSTAT) 0.4 MG sublingual tablet Place 1 tablet (0.4 mg) under the tongue every 5 minutes as needed for chest pain 25 tablet 3     omeprazole (PRILOSEC) 40 MG capsule Take 1 capsule (40 mg) by mouth every 12 hours 180 capsule 3     sertraline (ZOLOFT) 25 MG tablet Take 1 tablet (25 mg) by mouth daily 90 tablet 3     sucralfate (CARAFATE) 1 GM  tablet Take 1 tablet (1 g) by mouth 4 times daily May dissolve in 2 tsp water. 360 tablet 3     beclomethasone HFA (QVAR REDIHALER) 40 MCG/ACT inhaler Inhale 1 puff into the lungs 2 times daily (Patient not taking: Reported on 4/24/2019) 1 Inhaler 11     cyclobenzaprine (FLEXERIL) 5 MG tablet Take 1 tablet (5 mg) by mouth 3 times daily as needed for muscle spasms (Patient not taking: Reported on 4/24/2019) 21 tablet 0     ferrous fumarate 65 mg, [place]. FE,-Vitamin C 125 mg (VITRON C)  MG TABS tablet Take 1 tablet by mouth 2 times daily (Patient not taking: Reported on 4/24/2019) 30 tablet 1     hydrOXYzine (ATARAX) 25 MG tablet Take 1-2 tablets (25-50 mg) by mouth every 4 hours as needed for itching (Patient not taking: Reported on 4/24/2019) 30 tablet 1     order for DME Equipment being ordered: Walker Wheels ()  Treatment Diagnosis: s/p TKA 1 Units 0     order for DME Equipment being ordered: immobilizer 1 Device 0     oxyCODONE (ROXICODONE) 5 MG IR tablet Take 1-2 tablets (5-10 mg) by mouth every 3 hours as needed for moderate to severe pain (Patient not taking: Reported on 4/24/2019) 60 tablet 0     phenazopyridine (AZO) 97.5 MG tablet Take 2 tablets (195 mg) by mouth 3 times daily as needed for urinary tract discomfort (or bladder spasms; this drug will turn urine orange) (Patient not taking: Reported on 4/24/2019) 24 tablet 1     senna-docusate (SENOKOT-S;PERICOLACE) 8.6-50 MG per tablet Take 1-2 tablets by mouth 2 times daily (Patient not taking: Reported on 4/24/2019) 100 tablet 1     sulfamethoxazole-trimethoprim (BACTRIM DS/SEPTRA DS) 800-160 MG per tablet Take 1 tablet by mouth 2 times daily (Patient not taking: Reported on 4/24/2019) 14 tablet 0       ALLERGIES:     Allergies   Allergen Reactions     Amoxicillin Anaphylaxis     Rosuvastatin Other (See Comments)     Severe headache, backache     Reglan [Metoclopramide Hcl] Visual Disturbance     Disoriented, hallucinations       Problem list,  "Medication list, Allergies, and Medical/Social/Surgical histories reviewed in [facility] and updated as appropriate.    OBJECTIVE:                                                    VITALS: /80 (BP Location: Right arm, Cuff Size: Adult Large)   Pulse 64   Temp 97.9  F (36.6  C) (Tympanic)   Resp 16   Ht 1.638 m (5' 4.5\")   Wt 96 kg (211 lb 9.6 oz)   SpO2 94%   Breastfeeding? No   BMI 35.76 kg/m   Body mass index is 35.76 kg/m .  GENERAL: Pleasant, well appearing female.  HEENT: PERRL, EOMI, oropharynx clear.  NECK: supple, no thyromegaly or thyroid masses, no lymphadenopathy.  CV: RRR, no murmurs, rubs or gallops.  LUNGS: Clear to auscultation bilaterally, normal effort.  ABDOMEN: Soft, non-distended, non-tender.  No hepatosplenomegaly or palpable masses.    SKIN: warm and dry without obvious rashes.   EXTREMITIES: No edema, normal pulses.     ASSESSMENT/PLAN:                                                    1. Coronary artery disease involving native heart with angina pectoris, unspecified vessel or lesion type (H)  Stable. Refilled medication.  Check labs..  - atorvastatin (LIPITOR) 40 MG tablet; Take 1 tablet (40 mg) by mouth daily  Dispense: 90 tablet; Refill: 3  - lisinopril (PRINIVIL/ZESTRIL) 10 MG tablet; Take 1 tablet (10 mg) by mouth daily  Dispense: 90 tablet; Refill: 3  - metoprolol succinate ER (TOPROL XL) 25 MG 24 hr tablet; Take 1 tablet (25 mg) by mouth daily  Dispense: 90 tablet; Refill: 3  - nitroGLYcerin (NITROSTAT) 0.4 MG sublingual tablet; Place 1 tablet (0.4 mg) under the tongue every 5 minutes as needed for chest pain  Dispense: 25 tablet; Refill: 3  - Lipid panel reflex to direct LDL Fasting  - Comprehensive metabolic panel    2. Morbid obesity (H)  Working on lifestyle changes.     3. Mild intermittent asthma with exacerbation  Well controlled. Refilled medication.     - fluticasone (FLOVENT HFA) 110 MCG/ACT inhaler; Inhale 2 puffs into the lungs 2 times daily  - albuterol (PROAIR " HFA/PROVENTIL HFA/VENTOLIN HFA) 108 (90 Base) MCG/ACT inhaler; Inhale 2 puffs into the lungs every 4 hours as needed for shortness of breath / dyspnea  Dispense: 18 g; Refill: 3  - fluticasone (FLOVENT HFA) 110 MCG/ACT inhaler; Inhale 2 puffs into the lungs 2 times daily  Dispense: 3 Inhaler; Refill: 3    4. Anxiety  Stable. Refilled medication.    - sertraline (ZOLOFT) 25 MG tablet; Take 1 tablet (25 mg) by mouth daily  Dispense: 90 tablet; Refill: 3  - TSH with free T4 reflex    5. Osteoporosis, unspecified osteoporosis type, unspecified pathological fracture presence  - alendronate (FOSAMAX) 70 MG tablet; Take 1 tablet (70 mg) by mouth every 7 days Take with over 8 ounces water and stay upright for at least 30 minutes after dose.  Take at least 60 minutes before breakfast.  Dispense: 13 tablet; Refill: 3    6. Chronic rhinitis  Well controlled. Refilled medication.     - fluticasone (FLONASE) 50 MCG/ACT nasal spray; Spray 2 sprays into both nostrils daily  Dispense: 16 g; Refill: prn  - montelukast (SINGULAIR) 10 MG tablet; Take 1 tablet (10 mg) by mouth At Bedtime  Dispense: 90 tablet; Refill: 3    7. Special screening for malignant neoplasms, colon  - Fecal colorectal cancer screen (FIT); Future

## 2019-04-25 ASSESSMENT — ASTHMA QUESTIONNAIRES: ACT_TOTALSCORE: 25

## 2019-04-25 ASSESSMENT — ANXIETY QUESTIONNAIRES: GAD7 TOTAL SCORE: 1

## 2019-04-26 NOTE — RESULT ENCOUNTER NOTE
Notified via [employer]: Cholesterol mildly elevated.  Not abnormal enough at this point to warrant medication changes but I would recommend: increasing daily exercise, increasing dietary fiber, eliminating trans fats and reducing saturated fats. Otherwise labs look good.

## 2019-05-06 PROBLEM — J31.0 CHRONIC RHINITIS: Status: ACTIVE | Noted: 2019-05-06

## 2019-05-06 PROBLEM — E66.01 MORBID OBESITY (H): Status: ACTIVE | Noted: 2019-05-06

## 2019-05-17 PROCEDURE — 82274 ASSAY TEST FOR BLOOD FECAL: CPT | Performed by: FAMILY MEDICINE

## 2019-05-19 LAB — HEMOCCULT STL QL IA: NEGATIVE

## 2019-05-20 ENCOUNTER — HOSPITAL ENCOUNTER (EMERGENCY)
Facility: CLINIC | Age: 69
Discharge: HOME | End: 2019-05-20
Attending: FAMILY MEDICINE | Admitting: FAMILY MEDICINE
Payer: COMMERCIAL

## 2019-05-20 ENCOUNTER — APPOINTMENT (OUTPATIENT)
Dept: CT IMAGING | Facility: CLINIC | Age: 69
End: 2019-05-20
Attending: FAMILY MEDICINE
Payer: COMMERCIAL

## 2019-05-20 VITALS
WEIGHT: 185 LBS | DIASTOLIC BLOOD PRESSURE: 85 MMHG | RESPIRATION RATE: 15 BRPM | BODY MASS INDEX: 31.26 KG/M2 | HEART RATE: 55 BPM | OXYGEN SATURATION: 94 % | SYSTOLIC BLOOD PRESSURE: 152 MMHG | TEMPERATURE: 98.2 F

## 2019-05-20 DIAGNOSIS — I25.119 CORONARY ARTERY DISEASE INVOLVING NATIVE HEART WITH ANGINA PECTORIS, UNSPECIFIED VESSEL OR LESION TYPE (H): Chronic | ICD-10-CM

## 2019-05-20 DIAGNOSIS — Z12.11 SPECIAL SCREENING FOR MALIGNANT NEOPLASMS, COLON: ICD-10-CM

## 2019-05-20 DIAGNOSIS — R55 NEAR SYNCOPE: ICD-10-CM

## 2019-05-20 LAB
ALBUMIN SERPL-MCNC: 3.9 G/DL (ref 3.4–5)
ALP SERPL-CCNC: 126 U/L (ref 40–150)
ALT SERPL W P-5'-P-CCNC: 28 U/L (ref 0–50)
ANION GAP SERPL CALCULATED.3IONS-SCNC: 3 MMOL/L (ref 3–14)
AST SERPL W P-5'-P-CCNC: 20 U/L (ref 0–45)
BASOPHILS # BLD AUTO: 0 10E9/L (ref 0–0.2)
BASOPHILS NFR BLD AUTO: 0.6 %
BILIRUB SERPL-MCNC: 0.5 MG/DL (ref 0.2–1.3)
BUN SERPL-MCNC: 16 MG/DL (ref 7–30)
CALCIUM SERPL-MCNC: 9.2 MG/DL (ref 8.5–10.1)
CHLORIDE SERPL-SCNC: 104 MMOL/L (ref 94–109)
CO2 SERPL-SCNC: 29 MMOL/L (ref 20–32)
CREAT SERPL-MCNC: 0.86 MG/DL (ref 0.52–1.04)
D DIMER PPP FEU-MCNC: 1.4 UG/ML FEU (ref 0–0.5)
DIFFERENTIAL METHOD BLD: ABNORMAL
EOSINOPHIL # BLD AUTO: 0.1 10E9/L (ref 0–0.7)
EOSINOPHIL NFR BLD AUTO: 1.5 %
ERYTHROCYTE [DISTWIDTH] IN BLOOD BY AUTOMATED COUNT: 13.2 % (ref 10–15)
GFR SERPL CREATININE-BSD FRML MDRD: 69 ML/MIN/{1.73_M2}
GLUCOSE SERPL-MCNC: 106 MG/DL (ref 70–99)
HCT VFR BLD AUTO: 42.8 % (ref 35–47)
HGB BLD-MCNC: 13.3 G/DL (ref 11.7–15.7)
IMM GRANULOCYTES # BLD: 0.1 10E9/L (ref 0–0.4)
IMM GRANULOCYTES NFR BLD: 0.7 %
LYMPHOCYTES # BLD AUTO: 0.7 10E9/L (ref 0.8–5.3)
LYMPHOCYTES NFR BLD AUTO: 10 %
MCH RBC QN AUTO: 28.4 PG (ref 26.5–33)
MCHC RBC AUTO-ENTMCNC: 31.1 G/DL (ref 31.5–36.5)
MCV RBC AUTO: 91 FL (ref 78–100)
MONOCYTES # BLD AUTO: 0.5 10E9/L (ref 0–1.3)
MONOCYTES NFR BLD AUTO: 7.8 %
NEUTROPHILS # BLD AUTO: 5.3 10E9/L (ref 1.6–8.3)
NEUTROPHILS NFR BLD AUTO: 79.4 %
NRBC # BLD AUTO: 0 10*3/UL
NRBC BLD AUTO-RTO: 0 /100
PLATELET # BLD AUTO: 244 10E9/L (ref 150–450)
POTASSIUM SERPL-SCNC: 4.6 MMOL/L (ref 3.4–5.3)
PROT SERPL-MCNC: 7.8 G/DL (ref 6.8–8.8)
RBC # BLD AUTO: 4.69 10E12/L (ref 3.8–5.2)
SODIUM SERPL-SCNC: 136 MMOL/L (ref 133–144)
TROPONIN I SERPL-MCNC: <0.015 UG/L (ref 0–0.04)
TROPONIN I SERPL-MCNC: <0.015 UG/L (ref 0–0.04)
WBC # BLD AUTO: 6.7 10E9/L (ref 4–11)

## 2019-05-20 PROCEDURE — 80053 COMPREHEN METABOLIC PANEL: CPT | Performed by: FAMILY MEDICINE

## 2019-05-20 PROCEDURE — 85379 FIBRIN DEGRADATION QUANT: CPT | Performed by: FAMILY MEDICINE

## 2019-05-20 PROCEDURE — 93010 ELECTROCARDIOGRAM REPORT: CPT | Mod: Z6 | Performed by: FAMILY MEDICINE

## 2019-05-20 PROCEDURE — 25000128 H RX IP 250 OP 636: Performed by: FAMILY MEDICINE

## 2019-05-20 PROCEDURE — 99285 EMERGENCY DEPT VISIT HI MDM: CPT | Mod: 25 | Performed by: FAMILY MEDICINE

## 2019-05-20 PROCEDURE — 93005 ELECTROCARDIOGRAM TRACING: CPT

## 2019-05-20 PROCEDURE — 85025 COMPLETE CBC W/AUTO DIFF WBC: CPT | Performed by: FAMILY MEDICINE

## 2019-05-20 PROCEDURE — 71260 CT THORAX DX C+: CPT

## 2019-05-20 PROCEDURE — 84484 ASSAY OF TROPONIN QUANT: CPT | Performed by: FAMILY MEDICINE

## 2019-05-20 PROCEDURE — 99285 EMERGENCY DEPT VISIT HI MDM: CPT | Mod: 25

## 2019-05-20 PROCEDURE — 25000125 ZZHC RX 250: Performed by: FAMILY MEDICINE

## 2019-05-20 RX ORDER — IOPAMIDOL 755 MG/ML
74 INJECTION, SOLUTION INTRAVASCULAR ONCE
Status: COMPLETED | OUTPATIENT
Start: 2019-05-20 | End: 2019-05-20

## 2019-05-20 RX ADMIN — IOPAMIDOL 74 ML: 755 INJECTION, SOLUTION INTRAVENOUS at 13:50

## 2019-05-20 RX ADMIN — SODIUM CHLORIDE 100 ML: 9 INJECTION, SOLUTION INTRAVENOUS at 13:50

## 2019-05-20 ASSESSMENT — ENCOUNTER SYMPTOMS
BLOOD IN STOOL: 0
SHORTNESS OF BREATH: 0
CONSTIPATION: 0
LIGHT-HEADEDNESS: 1
COUGH: 0
FREQUENCY: 0
SINUS PRESSURE: 0
DIZZINESS: 1
FEVER: 0
DYSURIA: 0
DIAPHORESIS: 0
CHILLS: 0
VOMITING: 0
DIARRHEA: 0
NAUSEA: 1
HEADACHES: 0
SORE THROAT: 0
ABDOMINAL PAIN: 0
PALPITATIONS: 0
WHEEZING: 0

## 2019-05-20 NOTE — ED PROVIDER NOTES
History     Chief Complaint   Patient presents with     Dizziness     eposide of dizziness and was pumping gas when she felt dizzy and lightheaded, no CP, no SOA.     HPI  Anna Dahl is a 68 year old female with CAD, prior NSTEMI, s/p PTCAx6, s/p rt TKA 2 years ago who presents after dizziness, 1130 while pumping gas.  non-vertiginous,  light headed, no syncope, no dysequilibrium, sudden onset, associated with nausea, malaise.  diaphoretic.   No chest pain or shortness of breath, No palpitations.   No headache or vision change.  No significant exertion.  Her last stenting was in 2012.  felt well earlier today    arrived back from [place] flight last week.  Denies other recent prolonged travel (>3 hours) by car or plane, history or FHx of venous thromboembolism, recent surgery (last 4 weeks), active cancer history, hypercoagulable state, estrogen or other medications/conditions causing VTE or  new unilateral swelling or pain in the legs or calves.     Allergies:  Allergies   Allergen Reactions     Amoxicillin Anaphylaxis     Rosuvastatin Other (See Comments)     Severe headache, backache     Reglan [Metoclopramide Hcl] Visual Disturbance     Disoriented, hallucinations       Problem List:    Patient Active Problem List    Diagnosis Date Noted     Coronary artery disease involving native heart with angina pectoris, unspecified vessel or lesion type (H) 05/14/2012     Priority: High     NSTEMI May 2012        EF 35% (improved to 60-65% on ECHO done 2015)  PCI to mid-LAD vessel and first diagonal.  Plan to return at end of May 2012 for staged stenting of RCA  Rx Effient for one year, start Crestor       Obesity (BMI 35.0-39.9) with comorbidity (H) 05/06/2019     Priority: Medium     Chronic rhinitis 05/06/2019     Priority: Medium     Health Care Home 06/21/2017     Priority: Medium     *See Letters for [facility] Care Plan: My Access Plan         Acute kidney failure (H) 06/03/2017     Priority: Medium      "Postoperative anemia 06/03/2017     Priority: Medium     Elevated glucose 06/02/2017     Priority: Medium     06/2017 - fasting glucose 144, a1c 5.6%       S/P total knee arthroplasty 06/01/2017     Priority: Medium     Osteoporosis 10/13/2015     Priority: Medium     Anxiety 10/03/2013     Priority: Medium     BCC (basal cell carcinoma), face 03/28/2013     Priority: Medium     Non-ST elevation myocardial infarction (NSTEMI), initial care episode (H) 05/13/2012     Priority: Medium     Chronic sinusitis 08/23/2011     Priority: Medium     Advanced directives, counseling/discussion 08/22/2011     Priority: Medium     Patient does not have an Advance/Health Care Directive (HCD), given \"What is Advance Care Planning?\" flyer.    Izzy Maria  August 22, 2011         Mild intermittent asthma with exacerbation 08/22/2011     Priority: Medium     GERD (gastroesophageal reflux disease) 04/20/2011     Priority: Medium     Hyperlipidemia LDL goal <100 04/20/2011     Priority: Medium     Did not tolerate Crestor  Did not tolerate 80 mg of atorvastatin, but has been able to take 40 mg       Colon polyps 10/19/2010     Priority: Medium     20 mm polyp removed Oct 2010  Recommend repeat colonoscopy 1 year       Hiatal hernia 09/08/2010     Priority: Medium     Nasal polyp      Priority: Medium     Nasal polyps  Problem list name updated by automated process. Provider to review          Past Medical History:    Past Medical History:   Diagnosis Date     Asthma      Basal cell carcinoma      Calculus of kidney      Gastro-oesophageal reflux disease      Renal colic      Unspecified asthma(493.90)      Unspecified nasal polyp      Unspecified otitis media      Unspecified sinusitis (chronic)        Past Surgical History:    Past Surgical History:   Procedure Laterality Date     ARTHROPLASTY KNEE Right 6/1/2017    Procedure: ARTHROPLASTY KNEE;  Right total knee arthroplasty;  Surgeon: Colin Krause MD;  Location: [facility] "     ENT SURGERY       ESOPHAGOSCOPY, GASTROSCOPY, DUODENOSCOPY (EGD), COMBINED  2/6/2013    Procedure: COMBINED ESOPHAGOSCOPY, GASTROSCOPY, DUODENOSCOPY (EGD), BIOPSY SINGLE OR MULTIPLE;  Gastroscopy;  Surgeon: Yves Mills MD;  Location: [facility]     ETHMOIDECTOMY  1/5/2011    ETHMOIDECTOMY performed by ADDY SERRANO at [facility]     MOHS MICROGRAPHIC PROCEDURE       MOHS MICROGRAPHIC PROCEDURE       SURGICAL HISTORY OF -   6/2003    Bilateral total ethmoidecomy with bilateral maxillary antrostomies with removal of polyps, bilateral frontal sinusotomies, and left sphenoidotomy     SURGICAL HISTORY OF -   1985    Bilateral intranasal polypectomy with bilateraly nasal antral punctures      SURGICAL HISTORY OF -   2004    Mohs micrographic surgery, fresh tissue technique with complex wound repair. below right ear at angle of jawline.     TUBAL LIGATION      tubal ligation       Family History:    Family History   Problem Relation Age of Onset     Heart Disease Father         heart surgery     Lipids Father      Alzheimer Disease Mother      Lipids Brother      Lipids Brother        Social History:  Marital Status:   [2]  Social History     Tobacco Use     Smoking status: Never Smoker     Smokeless tobacco: Never Used   Substance Use Topics     Alcohol use: Yes     Comment: moderate couple drinks on weekends     Drug use: No        Medications:      acetaminophen (TYLENOL) 325 MG tablet   albuterol (PROAIR HFA/PROVENTIL HFA/VENTOLIN HFA) 108 (90 Base) MCG/ACT inhaler   alendronate (FOSAMAX) 70 MG tablet   aspirin  MG EC tablet   atorvastatin (LIPITOR) 40 MG tablet   beclomethasone HFA (QVAR REDIHALER) 40 MCG/ACT inhaler   cyclobenzaprine (FLEXERIL) 5 MG tablet   ferrous fumarate 65 mg, [place]. FE,-Vitamin C 125 mg (VITRON C)  MG TABS tablet   fluticasone (FLONASE) 50 MCG/ACT nasal spray   fluticasone (FLOVENT HFA) 110 MCG/ACT inhaler   fluticasone (FLOVENT HFA) 110 MCG/ACT inhaler    hydrOXYzine (ATARAX) 25 MG tablet   lisinopril (PRINIVIL/ZESTRIL) 10 MG tablet   LORazepam (ATIVAN) 1 MG tablet   LORazepam (ATIVAN) 1 MG tablet   metoprolol succinate ER (TOPROL XL) 25 MG 24 hr tablet   montelukast (SINGULAIR) 10 MG tablet   nitroGLYcerin (NITROSTAT) 0.4 MG sublingual tablet   omeprazole (PRILOSEC) 40 MG capsule   order for DME   order for DME   oxyCODONE (ROXICODONE) 5 MG IR tablet   phenazopyridine (AZO) 97.5 MG tablet   senna-docusate (SENOKOT-S;PERICOLACE) 8.6-50 MG per tablet   sertraline (ZOLOFT) 25 MG tablet   sucralfate (CARAFATE) 1 GM tablet   sulfamethoxazole-trimethoprim (BACTRIM DS/SEPTRA DS) 800-160 MG per tablet         Review of Systems   Constitutional: Negative for chills, diaphoresis and fever.   HENT: Negative for ear pain, sinus pressure and sore throat.    Eyes: Negative for visual disturbance.   Respiratory: Negative for cough, shortness of breath and wheezing.    Cardiovascular: Negative for chest pain and palpitations.   Gastrointestinal: Positive for nausea. Negative for abdominal pain, blood in stool, constipation, diarrhea and vomiting.   Genitourinary: Negative for dysuria, frequency and urgency.   Skin: Negative for rash.   Neurological: Positive for dizziness and light-headedness. Negative for syncope and headaches.   All other systems reviewed and are negative.           Physical Exam   BP: (!) 188/114  Pulse: 60  Temp: 98.2  F (36.8  C)  Resp: 16  Weight: 83.9 kg (185 lb)  SpO2: 97 %      Physical Exam   Constitutional: She appears distressed.   HENT:   Mouth/Throat: Oropharynx is clear and moist.   Eyes: Conjunctivae are normal.   Neck: Neck supple.   Cardiovascular: Normal rate, regular rhythm and normal heart sounds. Exam reveals no friction rub.   No murmur heard.  Pulmonary/Chest: Effort normal and breath sounds normal. No stridor. No respiratory distress. She has no wheezes.   Abdominal: Soft. Bowel sounds are normal. She exhibits no distension and no mass.  There is no tenderness. There is no guarding.   Musculoskeletal: She exhibits no edema.   Neurological: No cranial nerve deficit or sensory deficit. She exhibits normal muscle tone. Coordination normal.   Skin: No rash noted. She is not diaphoretic. No pallor.         ED Course        Procedures                 EKG Interpretation:      Interpreted by Kranthi Carl MD  EKG done at 1254 hrs. demonstrates a sinus rhythm at 54 bpm with a normal axis and no ST change.  T wave inversions in lead V1 through V3 with flattening in V4.  Inferior T wave flattening as well.  Normal R progression no Q waves.  Normal intervals.  Normal conduction.  No ectopy.  Impression sinus rhythm at 54 bpm no significant change from EKG done in June 2017.    Critical Care time:  none               Results for orders placed or performed during the hospital encounter of 05/20/19 (from the past 24 hour(s))   CBC with platelets differential   Result Value Ref Range    WBC 6.7 4.0 - 11.0 10e9/L    RBC Count 4.69 3.8 - 5.2 10e12/L    Hemoglobin 13.3 11.7 - 15.7 g/dL    Hematocrit 42.8 35.0 - 47.0 %    MCV 91 78 - 100 fl    MCH 28.4 26.5 - 33.0 pg    MCHC 31.1 (L) 31.5 - 36.5 g/dL    RDW 13.2 10.0 - 15.0 %    Platelet Count 244 150 - 450 10e9/L    Diff Method Automated Method     % Neutrophils 79.4 %    % Lymphocytes 10.0 %    % Monocytes 7.8 %    % Eosinophils 1.5 %    % Basophils 0.6 %    % Immature Granulocytes 0.7 %    Nucleated RBCs 0 0 /100    Absolute Neutrophil 5.3 1.6 - 8.3 10e9/L    Absolute Lymphocytes 0.7 (L) 0.8 - 5.3 10e9/L    Absolute Monocytes 0.5 0.0 - 1.3 10e9/L    Absolute Eosinophils 0.1 0.0 - 0.7 10e9/L    Absolute Basophils 0.0 0.0 - 0.2 10e9/L    Abs Immature Granulocytes 0.1 0 - 0.4 10e9/L    Absolute Nucleated RBC 0.0    Comprehensive metabolic panel   Result Value Ref Range    Sodium 136 133 - 144 mmol/L    Potassium 4.6 3.4 - 5.3 mmol/L    Chloride 104 94 - 109 mmol/L    Carbon Dioxide 29 20 - 32 mmol/L    Anion Gap 3 3  - 14 mmol/L    Glucose 106 (H) 70 - 99 mg/dL    Urea Nitrogen 16 7 - 30 mg/dL    Creatinine 0.86 0.52 - 1.04 mg/dL    GFR Estimate 69 >60 mL/min/[1.73_m2]    GFR Estimate If Black 80 >60 mL/min/[1.73_m2]    Calcium 9.2 8.5 - 10.1 mg/dL    Bilirubin Total 0.5 0.2 - 1.3 mg/dL    Albumin 3.9 3.4 - 5.0 g/dL    Protein Total 7.8 6.8 - 8.8 g/dL    Alkaline Phosphatase 126 40 - 150 U/L    ALT 28 0 - 50 U/L    AST 20 0 - 45 U/L   Troponin I   Result Value Ref Range    Troponin I ES <0.015 0.000 - 0.045 ug/L   D dimer quantitative   Result Value Ref Range    D Dimer 1.4 (H) 0.0 - 0.50 ug/ml FEU   Chest CT - IV contrast only - PE protocol    Narrative    CT CHEST PULMONARY EMBOLISM WITH CONTRAST May 20, 2019 1:57 PM    HISTORY: Near syncope. Recent plane travel. Elevated D-dimer.    TECHNIQUE: Pulmonary embolism protocol was performed. 74 mL Isovue 370  were injected IV. After contrast injection, volumetric helical  acquisition was performed from the thoracic inlet through the upper  abdomen. Radiation dose for this scan was reduced using automated  exposure control, adjustment of the mA and/or kV according to patient  size, or iterative reconstruction technique.    COMPARISON: Chest CT performed 6/17/2017.    FINDINGS: No evidence for pulmonary embolism. The thoracic aorta is of  normal caliber, without evidence for thoracic aortic aneurysm or  dissection. Atherosclerotic calcification of the thoracic aorta and  coronary arteries. No pleural effusions. A trace amount of pericardial  fluid has a similar appearance to the previous exam. No enlarged lymph  nodes are identified in the chest. A 0.6 cm subpleural nodule in the  left lower lobe laterally is unchanged for greater than two years, and  is therefore highly likely to be of benign etiology. The lungs are  otherwise clear. Limited views of the upper abdomen are unremarkable.  Degenerative changes are noted in the thoracic spine.       Impression    IMPRESSION:   1. No  evidence for pulmonary embolism or thoracic aortic dissection.    2. No cause for chest pain is identified.      JESUS ESPINOZA MD   Troponin I   Result Value Ref Range    Troponin I ES <0.015 0.000 - 0.045 ug/L       Medications - No data to display    Assessments & Plan (with Medical Decision Making)     MDM: Anna Dahl is a 68 year old female who presents with a history of coronary disease status post coronary stenting in 2012.  Has 6 stents.  Was in her usual state of health until approximately 1130 today when had onset of dizziness which she primarily describes as lightheadedness without vertigo or near syncope or disequilibrium.  She had this occur while she was pumping gas but was not significantly exerting herself.  No associated dyspnea but she did have sweats and a sense of nausea.  No syncopal episode.    She did just return from a long plane flight to [place] last week.  No history of venous thrombi embolism in the past or other risks.    Frontal diagnosis includes acute coronary syndrome, pulmonary embolism, dehydration, pneumonia.  Doubt vertiginous causes.  May have been near syncope and could certainly be an arrhythmia although EKG currently shows no change and she still has a sense of malaise while here.    Initial testing includes EKG, chest x-ray, chemistry panel, CBC, troponin and d-dimer.  We discussed serial troponins given her underlying history and consideration of overnight serial troponins with stress testing tomorrow versus outpatient stress testing as long as all testing is normal.    CT of the chest for PE protocol was performed and was normal due to elevation of d-dimer and risks as noted above.  Given her underlying cardiac risk a troponin was repeated and was normal.   I noted that her heart rate would periodically drop into the upper 40s and low 50s.  This could be resulting in some of her symptoms and I asked her to hold metoprolol which will allow her also to perform her  stress testing, Lexiscan.  I would also like her to undergo stress testing given her underlying cardiac risks and to follow-up with her primary provider and later with cardiology.  Precautions are also given for return to the emergency department for worsening.  We did discuss the potential to be observed on telemetry and serial troponins.  At this point the patient prefers to return home and  understands potential risks.      I have reviewed the nursing notes.    I have reviewed the findings, diagnosis, plan and need for follow up with the patient.          Medication List      There are no discharge medications for this visit.         Final diagnoses:   Near syncope - hold metoprolol due to low heart rate today (could instead cut in half).  Monitor blood pressure and heart rate.  zio monitor ordered. stress ekg.  Follow-up cardiology as well as primary provider.   Coronary artery disease involving native heart with angina pectoris, unspecified vessel or lesion type (H)       5/20/2019   [facility] EMERGENCY DEPARTMENT     Kranthi Carl MD  05/20/19 1922

## 2019-05-20 NOTE — DISCHARGE INSTRUCTIONS
ICD-10-CM    1. Near syncope R55     hold metoprolol due to low heart rate today (could instead cut in half).  Monitor blood pressure and heart rate.  zio monitor ordered. stress ekg.  Follow-up cardiology as well as primary provider.

## 2019-05-20 NOTE — ED AVS SNAPSHOT
[facility] Emergency Department  [address] [address]  Phone:  [phone]  Fax:  [phone]                                    Anna Dahl   MRN: [id number]    Department:  [facility] Emergency Department   Date of Visit:  5/20/2019           After Visit Summary Signature Page    I have received my discharge instructions, and my questions have been answered. I have discussed any challenges I see with this plan with the nurse or doctor.    ..........................................................................................................................................  Patient/Patient Representative Signature      ..........................................................................................................................................  Patient Representative Print Name and Relationship to Patient    ..................................................               ................................................  Date                                   Time    ..........................................................................................................................................  Reviewed by Signature/Title    ...................................................              ..............................................  Date                                               Time          22EPIC Rev 08/18

## 2019-05-20 NOTE — RESULT ENCOUNTER NOTE
Notified via [employer]: This is a normal result.  If you continue with FIT testing for colon cancer screening you need to repeat this test yearly.  You can opt for colonoscopy screening at any point which would be every 10 years if normal and no family history of colon cancer.

## 2019-07-17 ENCOUNTER — TELEPHONE (OUTPATIENT)
Dept: FAMILY MEDICINE | Facility: CLINIC | Age: 69
End: 2019-07-17

## 2019-07-17 DIAGNOSIS — F41.9 ANXIETY: ICD-10-CM

## 2019-07-17 RX ORDER — LORAZEPAM 1 MG/1
TABLET ORAL
Qty: 10 TABLET | Refills: 0 | Status: SHIPPED | OUTPATIENT
Start: 2019-07-17 | End: 2019-11-20

## 2019-07-17 NOTE — TELEPHONE ENCOUNTER
Ativan refill.  Last written 11/8/18 #10 + 1 refill. Takes B/4 big events.  Last seen 4/24/19.  Advise.  Lorie

## 2019-07-17 NOTE — TELEPHONE ENCOUNTER
Requested Prescriptions   Pending Prescriptions Disp Refills     LORazepam (ATIVAN) 1 MG tablet [Pharmacy Med Name: LORAZEPAM 1 MG TABLET] 10 tablet      Sig: Take 1/2 to 1 Tablet BY MOUTH EVERY 8 HOURS AS NEEDED for anxiety       There is no refill protocol information for this order        Last Written Prescription Date:  11/8/18  Last Fill Quantity: 10,  # refills: 1   Last office visit: 4/24/2019 with prescribing provider:  Jimi   Future Office Visit:

## 2019-09-10 DIAGNOSIS — R10.13 EPIGASTRIC PAIN: ICD-10-CM

## 2019-09-10 DIAGNOSIS — K44.9 HIATAL HERNIA: ICD-10-CM

## 2019-09-10 NOTE — TELEPHONE ENCOUNTER
"Requested Prescriptions   Pending Prescriptions Disp Refills     sucralfate (CARAFATE) 1 GM tablet [Pharmacy Med Name: SUCRALFATE 1 G TABLET] 360 tablet 3     Sig: TAKE 1 TABLET BY MOUTH FOUR TIMES DAILY. MAY DISSOLVE IN 2 TSP OF WATER   Last Written Prescription Date:  4/27/18  Last Fill Quantity: 360 tab,  # refills: 3   Last office visit: 4/24/2019 with prescribing provider:  Vivek Krause     Future Office Visit:        Miscellaneous Gastrointestinal Agents Passed - 9/10/2019  9:38 AM        Passed - Recent (12 mo) or future (30 days) visit within the authorizing provider's specialty     Patient had office visit in the last 12 months or has a visit in the next 30 days with authorizing provider or within the authorizing provider's specialty.  See \"Patient Info\" tab in inbasket, or \"Choose Columns\" in Meds & Orders section of the refill encounter.              Passed - Medication is active on med list        Passed - Patient is 18 years of age or older          "

## 2019-09-11 RX ORDER — SUCRALFATE 1 G/1
TABLET ORAL
Qty: 360 TABLET | Refills: 1 | Status: SHIPPED | OUTPATIENT
Start: 2019-09-11 | End: 2020-04-16

## 2019-09-24 ENCOUNTER — TELEPHONE (OUTPATIENT)
Dept: FAMILY MEDICINE | Facility: CLINIC | Age: 69
End: 2019-09-24

## 2019-09-24 DIAGNOSIS — K21.9 GASTROESOPHAGEAL REFLUX DISEASE WITHOUT ESOPHAGITIS: ICD-10-CM

## 2019-09-24 RX ORDER — OMEPRAZOLE 40 MG/1
40 CAPSULE, DELAYED RELEASE ORAL EVERY 12 HOURS
Qty: 180 CAPSULE | Refills: 1 | Status: SHIPPED | OUTPATIENT
Start: 2019-09-24 | End: 2020-04-16

## 2019-09-24 NOTE — TELEPHONE ENCOUNTER
Omeprazole      Last Written Prescription Date:  8/2/2018  Last Fill Quantity: 180,   # refills: 3  Last Office Visit: 4/24/19  Future Office visit:       Routing refill request to provider for review/approval because:

## 2019-11-05 ENCOUNTER — ALLIED HEALTH/NURSE VISIT (OUTPATIENT)
Dept: FAMILY MEDICINE | Facility: CLINIC | Age: 69
End: 2019-11-05
Payer: COMMERCIAL

## 2019-11-05 DIAGNOSIS — Z23 NEED FOR PROPHYLACTIC VACCINATION AND INOCULATION AGAINST INFLUENZA: Primary | ICD-10-CM

## 2019-11-05 PROCEDURE — 90662 IIV NO PRSV INCREASED AG IM: CPT

## 2019-11-05 PROCEDURE — G0008 ADMIN INFLUENZA VIRUS VAC: HCPCS

## 2019-11-05 PROCEDURE — 99207 ZZC NO CHARGE NURSE ONLY: CPT

## 2019-11-06 ASSESSMENT — ASTHMA QUESTIONNAIRES: ACT_TOTALSCORE: 25

## 2019-11-19 ENCOUNTER — TELEPHONE (OUTPATIENT)
Dept: FAMILY MEDICINE | Facility: CLINIC | Age: 69
End: 2019-11-19

## 2019-11-19 DIAGNOSIS — F41.9 ANXIETY: ICD-10-CM

## 2019-11-19 NOTE — TELEPHONE ENCOUNTER
Lorazepam refill.  Last seen 4/24/19.  Last written 7/17/19 #10 + 0 refills. Anxiety.  Advise.  KpavelRN

## 2019-11-19 NOTE — TELEPHONE ENCOUNTER
Reason for Call:  Medication refill:    Do you use a [facility]?  Name of the pharmacy and phone number for the current request:  [facility] [phone]    Name of the medication requested: Lorazepam-refills sent to new pharmacy    Can we leave a detailed message on this number? YES    Phone number patient can be reached at: Home number on file [phone] (home)    Best Time: Any    Call taken on 11/19/2019 at 1:55 PM by Nury Hummel

## 2019-11-20 RX ORDER — LORAZEPAM 1 MG/1
TABLET ORAL
Qty: 10 TABLET | Refills: 0 | Status: SHIPPED | OUTPATIENT
Start: 2019-11-20 | End: 2020-11-03

## 2019-11-20 NOTE — TELEPHONE ENCOUNTER
Prescription e-prescribed to pharmacy please notify patient.  Please be sure to clarify that I now have e-prescribing capability and they do not need to  the hard copy in clinic.

## 2020-01-29 ENCOUNTER — TELEPHONE (OUTPATIENT)
Dept: FAMILY MEDICINE | Facility: CLINIC | Age: 70
End: 2020-01-29

## 2020-01-29 DIAGNOSIS — J31.0 CHRONIC RHINITIS: ICD-10-CM

## 2020-01-29 RX ORDER — MONTELUKAST SODIUM 10 MG/1
10 TABLET ORAL AT BEDTIME
Qty: 90 TABLET | Refills: 0 | Status: SHIPPED | OUTPATIENT
Start: 2020-01-29 | End: 2020-04-16

## 2020-01-29 NOTE — TELEPHONE ENCOUNTER
Reason for Call:  Medication or medication refill:    Do you use a [facility]?  Name of the pharmacy and phone number for the current request:  [facility], [place]    Name of the medication requested: Sinulair - Pt's spouse calling for refill - He states that [facility] does not have any further refills.  No need to call patient back, unless there are questions or problems.      Singulair  Last Written Prescription Date:  4/24/19  Last Fill Quantity: 90,  # refills: 3   Last office visit: 11/5/2019 with prescribing provider:     Future Office Visit:      Other request:     Can we leave a detailed message on this number? YES    Phone number patient can be reached at: Cell number on file:    Telephone Information:   Mobile [phone]       Best Time: any    Call taken on 1/29/2020 at 10:46 AM by Christina Rolle

## 2020-01-29 NOTE — TELEPHONE ENCOUNTER
[facility] SHOULD have refills- was ordered last 4-24-19 for 1 year and sent there...    Refilled as patient is in [place] and has a current rx. Celeste Sharma RN

## 2020-02-10 ENCOUNTER — HEALTH MAINTENANCE LETTER (OUTPATIENT)
Age: 70
End: 2020-02-10

## 2020-03-30 DIAGNOSIS — J31.0 CHRONIC RHINITIS: ICD-10-CM

## 2020-03-30 RX ORDER — FLUTICASONE PROPIONATE 50 MCG
2 SPRAY, SUSPENSION (ML) NASAL DAILY
Qty: 16 G | Status: SHIPPED | OUTPATIENT
Start: 2020-03-30 | End: 2021-04-09

## 2020-03-30 NOTE — TELEPHONE ENCOUNTER
Requested Prescriptions   Pending Prescriptions Disp Refills     fluticasone (FLONASE) 50 MCG/ACT nasal spray 16 g prn     Sig: Spray 2 sprays into both nostrils daily       Nasal Allergy Protocol Passed - 3/30/2020  3:36 PM        Passed - Patient is age 12 or older        Passed - Medication is active on med list           Last Written Prescription Date:  4/24/2019  Last Fill Quantity: 16g,  # refills: prn   Last office visit: 4/24/2019 with prescribing provider:  Jimi    Future Office Visit:

## 2020-04-16 ENCOUNTER — VIRTUAL VISIT (OUTPATIENT)
Dept: FAMILY MEDICINE | Facility: CLINIC | Age: 70
End: 2020-04-16
Payer: COMMERCIAL

## 2020-04-16 ENCOUNTER — TELEPHONE (OUTPATIENT)
Dept: FAMILY MEDICINE | Facility: CLINIC | Age: 70
End: 2020-04-16

## 2020-04-16 DIAGNOSIS — F41.9 ANXIETY: ICD-10-CM

## 2020-04-16 DIAGNOSIS — K44.9 HIATAL HERNIA: ICD-10-CM

## 2020-04-16 DIAGNOSIS — Z12.11 SPECIAL SCREENING FOR MALIGNANT NEOPLASMS, COLON: ICD-10-CM

## 2020-04-16 DIAGNOSIS — J31.0 CHRONIC RHINITIS: ICD-10-CM

## 2020-04-16 DIAGNOSIS — J45.21 MILD INTERMITTENT ASTHMA WITH EXACERBATION: Primary | ICD-10-CM

## 2020-04-16 DIAGNOSIS — E66.01 MORBID OBESITY (H): Primary | ICD-10-CM

## 2020-04-16 DIAGNOSIS — J45.21 MILD INTERMITTENT ASTHMA WITH EXACERBATION: ICD-10-CM

## 2020-04-16 DIAGNOSIS — I25.119 CORONARY ARTERY DISEASE INVOLVING NATIVE HEART WITH ANGINA PECTORIS, UNSPECIFIED VESSEL OR LESION TYPE (H): ICD-10-CM

## 2020-04-16 DIAGNOSIS — M81.0 OSTEOPOROSIS, UNSPECIFIED OSTEOPOROSIS TYPE, UNSPECIFIED PATHOLOGICAL FRACTURE PRESENCE: ICD-10-CM

## 2020-04-16 DIAGNOSIS — K21.9 GASTROESOPHAGEAL REFLUX DISEASE WITHOUT ESOPHAGITIS: ICD-10-CM

## 2020-04-16 PROCEDURE — 99214 OFFICE O/P EST MOD 30 MIN: CPT | Mod: 95 | Performed by: FAMILY MEDICINE

## 2020-04-16 RX ORDER — OMEPRAZOLE 40 MG/1
40 CAPSULE, DELAYED RELEASE ORAL EVERY 12 HOURS
Qty: 180 CAPSULE | Refills: 3 | Status: SHIPPED | OUTPATIENT
Start: 2020-04-16 | End: 2022-03-30

## 2020-04-16 RX ORDER — MONTELUKAST SODIUM 10 MG/1
10 TABLET ORAL AT BEDTIME
Qty: 90 TABLET | Refills: 0 | Status: SHIPPED | OUTPATIENT
Start: 2020-04-16 | End: 2020-08-11

## 2020-04-16 RX ORDER — SUCRALFATE 1 G/1
TABLET ORAL
Qty: 360 TABLET | Refills: 3 | Status: SHIPPED | OUTPATIENT
Start: 2020-04-16 | End: 2021-10-19

## 2020-04-16 RX ORDER — ATORVASTATIN CALCIUM 40 MG/1
40 TABLET, FILM COATED ORAL EVERY EVENING
Qty: 90 TABLET | Refills: 3 | Status: SHIPPED | OUTPATIENT
Start: 2020-04-16 | End: 2021-04-09

## 2020-04-16 RX ORDER — LISINOPRIL 10 MG/1
10 TABLET ORAL DAILY
Qty: 90 TABLET | Refills: 3 | Status: SHIPPED | OUTPATIENT
Start: 2020-04-16 | End: 2021-04-09

## 2020-04-16 RX ORDER — DEXAMETHASONE 4 MG/1
2 TABLET ORAL 2 TIMES DAILY
Qty: 3 INHALER | Refills: 3 | Status: SHIPPED | OUTPATIENT
Start: 2020-04-16 | End: 2020-04-16

## 2020-04-16 RX ORDER — SERTRALINE HYDROCHLORIDE 25 MG/1
25 TABLET, FILM COATED ORAL DAILY
Qty: 90 TABLET | Refills: 3 | Status: SHIPPED | OUTPATIENT
Start: 2020-04-16 | End: 2021-04-09

## 2020-04-16 RX ORDER — METOPROLOL SUCCINATE 25 MG/1
25 TABLET, EXTENDED RELEASE ORAL DAILY
Qty: 90 TABLET | Refills: 3 | Status: SHIPPED | OUTPATIENT
Start: 2020-04-16 | End: 2021-04-09

## 2020-04-16 RX ORDER — ALENDRONATE SODIUM 70 MG/1
70 TABLET ORAL
Qty: 13 TABLET | Refills: 3 | Status: SHIPPED | OUTPATIENT
Start: 2020-04-16

## 2020-04-16 RX ORDER — ALBUTEROL SULFATE 90 UG/1
2 AEROSOL, METERED RESPIRATORY (INHALATION) EVERY 4 HOURS PRN
Qty: 18 G | Refills: 3 | Status: SHIPPED | OUTPATIENT
Start: 2020-04-16 | End: 2021-04-09

## 2020-04-16 NOTE — TELEPHONE ENCOUNTER
Per fax received from [facility] - Flovent is not covered by patient's Insurance Company  Dr. Krause - Please choose:  1.  Change medication that is not covered to a different medication and send new prescription to patient's pharmacy?  2.  Patient will need to pay for the non-covered medication out-of-pocket?   3.  Try for Prior Authorization with Insurance Company to get medication covered?

## 2020-04-16 NOTE — PATIENT INSTRUCTIONS
Health Maintenance reviewed and plan for update discussed.  complete fit kit   Schedule mammogram when able      Our Clinic hours are:  Mondays    7:20 am - 7 pm  Tues -  Fri  7:20 am - 5 pm    Clinic Phone: [phone]    The clinic lab opens at 7:30 am Mon - Fri and appointments are required.    [facility]  Ph. [phone]  Monday  8 am - 7pm  Tues - Fri 8 am - 5:30 pm

## 2020-04-16 NOTE — TELEPHONE ENCOUNTER
Spoke with pharmacy this medication is on backorder.  Does NOT need a PA.  Need alternative sent for Flovent.    Nohemi Schafer RN

## 2020-04-16 NOTE — PROGRESS NOTES
"Anna Dahl is a 69 year old female who is being evaluated via a billable telephone visit.      The patient has been notified of following:     \"This telephone visit will be conducted via a call between you and your physician/provider. We have found that certain health care needs can be provided without the need for a physical exam.  This service lets us provide the care you need with a short phone conversation.  If a prescription is necessary we can send it directly to your pharmacy.  If lab work is needed we can place an order for that and you can then stop by our lab to have the test done at a later time.    Telephone visits are billed at different rates depending on your insurance coverage. During this emergency period, for some insurers they may be billed the same as an in-person visit.  Please reach out to your insurance provider with any questions.    If during the course of the call the physician/provider feels a telephone visit is not appropriate, you will not be charged for this service.\"    Patient has given verbal consent for Telephone visit?  Yes    How would you like to obtain your AVS? Jose Eliast    Subjective     Anna Dahl is a 69 year old female who presents to clinic today for the following health issues:  Chief Complaint   Patient presents with     Asthma     Health Maintenance     mailed Fit kit. agreed to mammogram     Lipids     Hypertension         Hyperlipidemia Follow-Up      Are you regularly taking any medication or supplement to lower your cholesterol?   Yes- lipitor    Are you having muscle aches or other side effects that you think could be caused by your cholesterol lowering medication?  No    Hypertension Follow-up      Do you check your blood pressure regularly outside of the clinic? No     Are you following a low salt diet? Yes    Are your blood pressures ever more than 140 on the top number (systolic) OR more   than 90 on the bottom number (diastolic), for example 140/90? " unknown    Asthma Follow-Up    Was ACT completed today?    Yes    ACT Total Scores 4/16/2020   ACT TOTAL SCORE -   ASTHMA ER VISITS -   ASTHMA HOSPITALIZATIONS -   ACT TOTAL SCORE (Goal Greater than or Equal to 20) 25   In the past 12 months, how many times did you visit the emergency room for your asthma without being admitted to the hospital? 0   In the past 12 months, how many times were you hospitalized overnight because of your asthma? 0       How many days per week do you miss taking your asthma controller medication?  0    Please describe any recent triggers for your asthma: upper respiratory infections    Have you had any Emergency Room Visits, Urgent Care Visits, or Hospital Admissions since your last office visit?  No      How many servings of fruits and vegetables do you eat daily?  0-1    On average, how many sweetened beverages do you drink each day (Examples: soda, juice, sweet tea, etc.  Do NOT count diet or artificially sweetened beverages)?   1 glass of juice in morning    How many days per week do you exercise enough to make your heart beat faster? 3 or less    How many minutes a day do you exercise enough to make your heart beat faster? 10 - 19    How many days per week do you miss taking your medication? 0          Patient Active Problem List   Diagnosis     Nasal polyp     Hiatal hernia     Colon polyps     GERD (gastroesophageal reflux disease)     Hyperlipidemia LDL goal <100     Advanced directives, counseling/discussion     Mild intermittent asthma with exacerbation     Chronic sinusitis     Non-ST elevation myocardial infarction (NSTEMI), initial care episode (H)     Coronary artery disease involving native heart with angina pectoris, unspecified vessel or lesion type (H)     BCC (basal cell carcinoma), face     Anxiety     Osteoporosis     S/P total knee arthroplasty     Elevated glucose     Acute kidney failure (H)     Postoperative anemia     Health Care Home     Obesity (BMI 35.0-39.9)  with comorbidity (H)     Chronic rhinitis     Past Surgical History:   Procedure Laterality Date     ARTHROPLASTY KNEE Right 6/1/2017    Procedure: ARTHROPLASTY KNEE;  Right total knee arthroplasty;  Surgeon: Colin Krause MD;  Location: [facility]     ENT SURGERY       ESOPHAGOSCOPY, GASTROSCOPY, DUODENOSCOPY (EGD), COMBINED  2/6/2013    Procedure: COMBINED ESOPHAGOSCOPY, GASTROSCOPY, DUODENOSCOPY (EGD), BIOPSY SINGLE OR MULTIPLE;  Gastroscopy;  Surgeon: Yves Mills MD;  Location: [facility]     ETHMOIDECTOMY  1/5/2011    ETHMOIDECTOMY performed by ADDY SERRANO at [facility]     MOHS MICROGRAPHIC PROCEDURE       MOHS MICROGRAPHIC PROCEDURE       SURGICAL HISTORY OF -   6/2003    Bilateral total ethmoidecomy with bilateral maxillary antrostomies with removal of polyps, bilateral frontal sinusotomies, and left sphenoidotomy     SURGICAL HISTORY OF -   1985    Bilateral intranasal polypectomy with bilateraly nasal antral punctures      SURGICAL HISTORY OF -   2004    Mohs micrographic surgery, fresh tissue technique with complex wound repair. below right ear at angle of jawline.     TUBAL LIGATION      tubal ligation       Social History     Tobacco Use     Smoking status: Never Smoker     Smokeless tobacco: Never Used   Substance Use Topics     Alcohol use: Yes     Comment: moderate couple drinks on weekends     Family History   Problem Relation Age of Onset     Heart Disease Father         heart surgery     Lipids Father      Alzheimer Disease Mother      Lipids Brother      Lipids Brother          Current Outpatient Medications   Medication Sig Dispense Refill     acetaminophen (TYLENOL) 325 MG tablet Take 2 tablets by mouth every 4 hours as needed. 250 tablet      albuterol (PROAIR HFA/PROVENTIL HFA/VENTOLIN HFA) 108 (90 Base) MCG/ACT inhaler Inhale 2 puffs into the lungs every 4 hours as needed for shortness of breath / dyspnea 18 g 3     alendronate (FOSAMAX) 70 MG tablet Take 1 tablet (70 mg) by  mouth every 7 days Take with over 8 ounces water and stay upright for at least 30 minutes after dose.  Take at least 60 minutes before breakfast. 13 tablet 3     aspirin (ASA) 81 MG EC tablet Take 81 mg by mouth every evening       atorvastatin (LIPITOR) 40 MG tablet Take 1 tablet (40 mg) by mouth every evening 90 tablet 3     fluticasone (FLONASE) 50 MCG/ACT nasal spray Spray 2 sprays into both nostrils daily 16 g prn     fluticasone (FLOVENT HFA) 110 MCG/ACT inhaler Inhale 2 puffs into the lungs 2 times daily 3 Inhaler 3     lisinopril (ZESTRIL) 10 MG tablet Take 1 tablet (10 mg) by mouth daily 90 tablet 3     LORazepam (ATIVAN) 1 MG tablet Take 1/2 to 1 Tablet BY MOUTH EVERY 8 HOURS AS NEEDED for anxiety 10 tablet 0     metoprolol succinate ER (TOPROL XL) 25 MG 24 hr tablet Take 1 tablet (25 mg) by mouth daily 90 tablet 3     montelukast (SINGULAIR) 10 MG tablet Take 1 tablet (10 mg) by mouth At Bedtime 90 tablet 0     nitroGLYcerin (NITROSTAT) 0.4 MG sublingual tablet Place 1 tablet (0.4 mg) under the tongue every 5 minutes as needed for chest pain 25 tablet 3     omeprazole (PRILOSEC) 40 MG DR capsule Take 1 capsule (40 mg) by mouth every 12 hours 180 capsule 3     sertraline (ZOLOFT) 25 MG tablet Take 1 tablet (25 mg) by mouth daily 90 tablet 3     sucralfate (CARAFATE) 1 GM tablet TAKE 1 TABLET BY MOUTH FOUR TIMES DAILY. MAY DISSOLVE IN 2 TSP OF WATER 360 tablet 3     Allergies   Allergen Reactions     Amoxicillin Anaphylaxis     Rosuvastatin Other (See Comments)     Severe headache, backache     Beclomethasone Other (See Comments)     Headache from the Qvar Inhaler     Reglan [Metoclopramide Hcl] Visual Disturbance     Disoriented, hallucinations       Reviewed and updated as needed this visit by Provider  Tobacco  Allergies  Meds  Problems  Med Hx  Surg Hx  Fam Hx         Review of Systems   ROS COMP: Constitutional, neuro, ENT, endocrine, pulmonary, cardiac, gastrointestinal, genitourinary,  musculoskeletal, integument and psychiatric systems are negative, except as otherwise noted.        Objective   Reported vitals:  There were no vitals taken for this visit.   healthy, alert and no distress  PSYCH: Alert and oriented times 3; coherent speech, normal   rate and volume, able to articulate logical thoughts, able   to abstract reason, no tangential thoughts, no hallucinations   or delusions  Her affect is normal and pleasant  RESP: No cough, no audible wheezing, able to talk in full sentences  Remainder of exam unable to be completed due to telephone visits          Assessment/Plan:  This patient was seen virtually during the COVID-19 outbreak in attempts to keep healthy patients out of the clinic per CDC guidelines (practicing social distancing).  I evaluated them by phone/evisit and the note reflects our conversation/visit.     1. Coronary artery disease involving native heart with angina pectoris, unspecified vessel or lesion type (H)  Stable. Refilled medication.  Will check labs once covid pandemic settles down.   - atorvastatin (LIPITOR) 40 MG tablet; Take 1 tablet (40 mg) by mouth every evening  Dispense: 90 tablet; Refill: 3  - lisinopril (ZESTRIL) 10 MG tablet; Take 1 tablet (10 mg) by mouth daily  Dispense: 90 tablet; Refill: 3  - metoprolol succinate ER (TOPROL XL) 25 MG 24 hr tablet; Take 1 tablet (25 mg) by mouth daily  Dispense: 90 tablet; Refill: 3  - Lipid panel reflex to direct LDL Fasting; Future  - Basic metabolic panel; Future    2. Morbid obesity (H)  Working on lifestyle changes.     3. Mild intermittent asthma with exacerbation  Stable. Refilled medication.    - albuterol (PROAIR HFA/PROVENTIL HFA/VENTOLIN HFA) 108 (90 Base) MCG/ACT inhaler; Inhale 2 puffs into the lungs every 4 hours as needed for shortness of breath / dyspnea  Dispense: 18 g; Refill: 3  - fluticasone (FLOVENT HFA) 110 MCG/ACT inhaler; Inhale 2 puffs into the lungs 2 times daily  Dispense: 3 Inhaler; Refill:  3    4. Osteoporosis, unspecified osteoporosis type, unspecified pathological fracture presence  Stable. Refilled medication.    - alendronate (FOSAMAX) 70 MG tablet; Take 1 tablet (70 mg) by mouth every 7 days Take with over 8 ounces water and stay upright for at least 30 minutes after dose.  Take at least 60 minutes before breakfast.  Dispense: 13 tablet; Refill: 3    5. Chronic rhinitis  Stable. Refilled medication.    - montelukast (SINGULAIR) 10 MG tablet; Take 1 tablet (10 mg) by mouth At Bedtime  Dispense: 90 tablet; Refill: 0    6. Gastroesophageal reflux disease without esophagitis  Stable. Refilled medication.    - omeprazole (PRILOSEC) 40 MG DR capsule; Take 1 capsule (40 mg) by mouth every 12 hours  Dispense: 180 capsule; Refill: 3    7. Anxiety  Well controlled. Refilled medication.    - sertraline (ZOLOFT) 25 MG tablet; Take 1 tablet (25 mg) by mouth daily  Dispense: 90 tablet; Refill: 3    8. Hiatal hernia  Stable. Refilled medication.    - sucralfate (CARAFATE) 1 GM tablet; TAKE 1 TABLET BY MOUTH FOUR TIMES DAILY. MAY DISSOLVE IN 2 TSP OF WATER  Dispense: 360 tablet; Refill: 3    9. Special screening for malignant neoplasms, colon  - Fecal colorectal cancer screen (FIT); Future    Return in about 3 months (around 7/16/2020) for re-check, or when Covid settles down.      Phone call duration:  15 minutes    Vivek Krause MD

## 2020-04-17 ASSESSMENT — ASTHMA QUESTIONNAIRES: ACT_TOTALSCORE: 25

## 2020-06-15 DIAGNOSIS — Z12.11 SPECIAL SCREENING FOR MALIGNANT NEOPLASMS, COLON: ICD-10-CM

## 2020-06-15 PROCEDURE — 82274 ASSAY TEST FOR BLOOD FECAL: CPT | Performed by: FAMILY MEDICINE

## 2020-06-17 LAB — HEMOCCULT STL QL IA: NEGATIVE

## 2020-06-18 NOTE — RESULT ENCOUNTER NOTE
Notified via [employer] This is a normal result.  If you continue with FIT testing for colon cancer screening you need to repeat this test yearly.  You can opt for colonoscopy screening at any point which would be every 10 years if normal and no family history of colon cancer.

## 2020-08-09 DIAGNOSIS — J31.0 CHRONIC RHINITIS: ICD-10-CM

## 2020-08-10 NOTE — TELEPHONE ENCOUNTER
"Requested Prescriptions   Pending Prescriptions Disp Refills     montelukast (SINGULAIR) 10 MG tablet [Pharmacy Med Name: Montelukast Sodium 10 MG Oral Tablet] 90 tablet 0     Sig: TAKE 1 TABLET BY MOUTH AT BEDTIME       Leukotriene Inhibitors Protocol Passed - 8/9/2020 12:28 PM        Passed - Patient is age 12 or older     If patient is under 16, ok to refill using age based dosing.           Passed - Asthma control assessment score within normal limits in last 6 months     Please review ACT score.           Passed - Medication is active on med list        Passed - Recent (6 mo) or future (30 days) visit within the authorizing provider's specialty     Patient had office visit in the last 6 months or has a visit in the next 30 days with authorizing provider or within the authorizing provider's specialty.  See \"Patient Info\" tab in inbasket, or \"Choose Columns\" in Meds & Orders section of the refill encounter.                 "

## 2020-08-11 RX ORDER — MONTELUKAST SODIUM 10 MG/1
TABLET ORAL
Qty: 90 TABLET | Refills: 0 | Status: SHIPPED | OUTPATIENT
Start: 2020-08-11 | End: 2020-11-12

## 2020-08-11 NOTE — TELEPHONE ENCOUNTER
Prescription approved per [facility] Refill Protocol.    Azra WOODS RN, BSN

## 2020-10-30 DIAGNOSIS — F41.9 ANXIETY: ICD-10-CM

## 2020-10-30 NOTE — TELEPHONE ENCOUNTER
Requested Prescriptions   Pending Prescriptions Disp Refills     LORazepam (ATIVAN) 1 MG tablet [Pharmacy Med Name: LORazepam 1 MG Oral Tablet] 10 tablet 0     Sig: TAKE 1/2 TO 1 (ONE-HALF TO ONE) TABLET BY MOUTH EVERY 8 HOURS AS NEEDED FOR ANXIETY       There is no refill protocol information for this order           medications

## 2020-11-02 NOTE — TELEPHONE ENCOUNTER
Routing refill request to provider for review/approval because:  Drug not on the [facility] refill protocol   4/16/2020 VV note: 'Return in about 3 months (around 7/16/2020) for re-check, or when Covid settles down.'    Azra WOODS RN, BSN

## 2020-11-03 RX ORDER — LORAZEPAM 1 MG/1
TABLET ORAL
Qty: 10 TABLET | Refills: 0 | Status: SHIPPED | OUTPATIENT
Start: 2020-11-03 | End: 2021-08-13

## 2020-11-12 DIAGNOSIS — J31.0 CHRONIC RHINITIS: ICD-10-CM

## 2020-11-12 RX ORDER — MONTELUKAST SODIUM 10 MG/1
TABLET ORAL
Qty: 90 TABLET | Refills: 0 | Status: SHIPPED | OUTPATIENT
Start: 2020-11-12 | End: 2021-02-12

## 2020-11-12 NOTE — TELEPHONE ENCOUNTER
"Requested Prescriptions   Pending Prescriptions Disp Refills     montelukast (SINGULAIR) 10 MG tablet [Pharmacy Med Name: Montelukast Sodium 10 MG Oral Tablet] 90 tablet 0     Sig: TAKE 1 TABLET BY MOUTH AT BEDTIME       Leukotriene Inhibitors Protocol Failed - 11/12/2020 11:07 AM        Failed - Asthma control assessment score within normal limits in last 6 months     Please review ACT score.           Failed - Recent (6 mo) or future (30 days) visit within the authorizing provider's specialty     Patient had office visit in the last 6 months or has a visit in the next 30 days with authorizing provider or within the authorizing provider's specialty.  See \"Patient Info\" tab in inbasket, or \"Choose Columns\" in Meds & Orders section of the refill encounter.            Passed - Patient is age 12 or older     If patient is under 16, ok to refill using age based dosing.           Passed - Medication is active on med list             "

## 2020-11-16 ENCOUNTER — HEALTH MAINTENANCE LETTER (OUTPATIENT)
Age: 70
End: 2020-11-16

## 2021-02-04 ENCOUNTER — TELEPHONE (OUTPATIENT)
Dept: FAMILY MEDICINE | Facility: CLINIC | Age: 71
End: 2021-02-04

## 2021-02-04 NOTE — LETTER
[facility]  [address]  [facility] [address]  [phone]  February 4, 2021    Anna Dahl  [address] [place] [place] [address]    Dear Anna,    We care about your health and have reviewed your health plan including your medical conditions, medication list, and lab results.  Based on this review, it is recommended that you follow up regarding the following health topic(s):     -Breast Cancer Screening    We recommend you take the following action(s):    This is a reminder that it is time to make your appointment for your annual mammogram. You may make an appointment for your Digital Mammogram by calling our scheduling line at [phone] to schedule at one of our locations. Your last mammogram was 5/16/2018.    If you are experiencing breast symptoms or concerns such as a new lump or breast pain you should first contact your health care provider before scheduling your mammogram. Your physician may want to see you prior to your visit.    Please disregard this notice if you have already scheduled an appointment. Or, if you have had this done elsewhere, please have your records sent to the clinic.     Thank you and we look forward to seeing you in the near future for your annual screening mammogram.      Healthy Regards,    Vivek Krause MD  Your Health Care Team  [facility]

## 2021-02-04 NOTE — TELEPHONE ENCOUNTER
Panel Management Review      Patient has the following on her problem list:     Asthma review     ACT Total Scores 4/16/2020   ACT TOTAL SCORE -   ASTHMA ER VISITS -   ASTHMA HOSPITALIZATIONS -   ACT TOTAL SCORE (Goal Greater than or Equal to 20) 25   In the past 12 months, how many times did you visit the emergency room for your asthma without being admitted to the hospital? 0   In the past 12 months, how many times were you hospitalized overnight because of your asthma? 0      1. Is Asthma diagnosis on the Problem List? Yes    2. Is Asthma listed on Health Maintenance? Yes    3. Patient is due for:  ACT      Composite cancer screening  Chart review shows that this patient is due/due soon for the following Mammogram and Colonoscopy  Summary:    Patient is due/failing the following:   COLONOSCOPY and MAMMOGRAM    Action needed:   Pt due for mammogram and colonoscopy    Type of outreach:    Sent letter.    Questions for provider review:    None                                                                                                                                    Debora Maxwell MA

## 2021-02-04 NOTE — LETTER
[facility]  [address]  [facility] [address]  [phone]  February 4, 2021    Anna Dahl  [address] [address]    Dear Anna,    We care about your health and have reviewed your health plan including your medical conditions, medication list, and lab results.  Based on this review, it is recommended that you follow up regarding the following health topic(s):     -Wellness (Physical) Visit       We recommend you take the following action(s):    -schedule a annual physical/pap which is currently due. Please use [employer], or call the clinic at your earliest convenience, to schedule an appointment: [phone].     Please disregard this reminder if you have had this exam elsewhere within the last year. It would be helpful for us to have a copy of your recent pap smear report in our file so that we can best coordinate your care.     Thank you for trusting [facility] and we appreciate the opportunity to serve you.  We look forward to supporting your healthcare needs in the future.    Healthy Regards,      Vivek Krause MD  Your Health Care Team  [facility]

## 2021-02-11 DIAGNOSIS — J31.0 CHRONIC RHINITIS: ICD-10-CM

## 2021-02-11 NOTE — TELEPHONE ENCOUNTER
Routing refill request to provider for review/approval because:  Takes for chronic rhinitis   ACT outdated  Overdue for follow up     ACT Total Scores 4/24/2019 11/5/2019 4/16/2020   ACT TOTAL SCORE - - -   ASTHMA ER VISITS - - -   ASTHMA HOSPITALIZATIONS - - -   ACT TOTAL SCORE (Goal Greater than or Equal to 20) 25 25 25   In the past 12 months, how many times did you visit the emergency room for your asthma without being admitted to the hospital? 0 0 0   In the past 12 months, how many times were you hospitalized overnight because of your asthma? 0 0 0     Azra WOODS, RN, BSN

## 2021-02-12 RX ORDER — MONTELUKAST SODIUM 10 MG/1
TABLET ORAL
Qty: 90 TABLET | Refills: 0 | Status: SHIPPED | OUTPATIENT
Start: 2021-02-12 | End: 2021-04-09

## 2021-04-03 ENCOUNTER — HEALTH MAINTENANCE LETTER (OUTPATIENT)
Age: 71
End: 2021-04-03

## 2021-04-09 ENCOUNTER — OFFICE VISIT (OUTPATIENT)
Dept: FAMILY MEDICINE | Facility: CLINIC | Age: 71
End: 2021-04-09
Payer: COMMERCIAL

## 2021-04-09 VITALS
DIASTOLIC BLOOD PRESSURE: 70 MMHG | HEIGHT: 65 IN | TEMPERATURE: 97.2 F | WEIGHT: 191 LBS | SYSTOLIC BLOOD PRESSURE: 138 MMHG | OXYGEN SATURATION: 97 % | BODY MASS INDEX: 31.82 KG/M2 | HEART RATE: 58 BPM | RESPIRATION RATE: 16 BRPM

## 2021-04-09 DIAGNOSIS — I25.119 CORONARY ARTERY DISEASE INVOLVING NATIVE HEART WITH ANGINA PECTORIS, UNSPECIFIED VESSEL OR LESION TYPE (H): ICD-10-CM

## 2021-04-09 DIAGNOSIS — J31.0 CHRONIC RHINITIS: ICD-10-CM

## 2021-04-09 DIAGNOSIS — Z12.31 ENCOUNTER FOR SCREENING MAMMOGRAM FOR BREAST CANCER: ICD-10-CM

## 2021-04-09 DIAGNOSIS — J45.21 MILD INTERMITTENT ASTHMA WITH EXACERBATION: Primary | ICD-10-CM

## 2021-04-09 DIAGNOSIS — F41.9 ANXIETY: ICD-10-CM

## 2021-04-09 DIAGNOSIS — E66.01 MORBID OBESITY (H): ICD-10-CM

## 2021-04-09 LAB
ANION GAP SERPL CALCULATED.3IONS-SCNC: 4 MMOL/L (ref 3–14)
BUN SERPL-MCNC: 16 MG/DL (ref 7–30)
CALCIUM SERPL-MCNC: 8.9 MG/DL (ref 8.5–10.1)
CHLORIDE SERPL-SCNC: 106 MMOL/L (ref 94–109)
CHOLEST SERPL-MCNC: 147 MG/DL
CO2 SERPL-SCNC: 27 MMOL/L (ref 20–32)
CREAT SERPL-MCNC: 0.82 MG/DL (ref 0.52–1.04)
GFR SERPL CREATININE-BSD FRML MDRD: 71 ML/MIN/{1.73_M2}
GLUCOSE SERPL-MCNC: 101 MG/DL (ref 70–99)
HDLC SERPL-MCNC: 49 MG/DL
LDLC SERPL CALC-MCNC: 79 MG/DL
NONHDLC SERPL-MCNC: 98 MG/DL
POTASSIUM SERPL-SCNC: 4.3 MMOL/L (ref 3.4–5.3)
SODIUM SERPL-SCNC: 137 MMOL/L (ref 133–144)
TRIGL SERPL-MCNC: 94 MG/DL

## 2021-04-09 PROCEDURE — 36415 COLL VENOUS BLD VENIPUNCTURE: CPT | Performed by: FAMILY MEDICINE

## 2021-04-09 PROCEDURE — 99214 OFFICE O/P EST MOD 30 MIN: CPT | Performed by: FAMILY MEDICINE

## 2021-04-09 PROCEDURE — 80048 BASIC METABOLIC PNL TOTAL CA: CPT | Performed by: FAMILY MEDICINE

## 2021-04-09 PROCEDURE — 80061 LIPID PANEL: CPT | Performed by: FAMILY MEDICINE

## 2021-04-09 RX ORDER — ALBUTEROL SULFATE 90 UG/1
2 AEROSOL, METERED RESPIRATORY (INHALATION) EVERY 4 HOURS PRN
Qty: 18 G | Refills: 3 | Status: SHIPPED | OUTPATIENT
Start: 2021-04-09 | End: 2022-05-27

## 2021-04-09 RX ORDER — METOPROLOL SUCCINATE 25 MG/1
25 TABLET, EXTENDED RELEASE ORAL DAILY
Qty: 90 TABLET | Refills: 4 | Status: SHIPPED | OUTPATIENT
Start: 2021-04-09 | End: 2022-04-29

## 2021-04-09 RX ORDER — ATORVASTATIN CALCIUM 40 MG/1
40 TABLET, FILM COATED ORAL EVERY EVENING
Qty: 90 TABLET | Refills: 4 | Status: SHIPPED | OUTPATIENT
Start: 2021-04-09 | End: 2022-05-27

## 2021-04-09 RX ORDER — LISINOPRIL 10 MG/1
10 TABLET ORAL DAILY
Qty: 90 TABLET | Refills: 4 | Status: SHIPPED | OUTPATIENT
Start: 2021-04-09 | End: 2022-04-29

## 2021-04-09 RX ORDER — ALBUTEROL SULFATE 0.83 MG/ML
2.5 SOLUTION RESPIRATORY (INHALATION) EVERY 6 HOURS PRN
COMMUNITY
End: 2021-04-09

## 2021-04-09 RX ORDER — ALBUTEROL SULFATE 0.83 MG/ML
2.5 SOLUTION RESPIRATORY (INHALATION) EVERY 6 HOURS PRN
Qty: 9 ML | Refills: 11 | Status: SHIPPED | OUTPATIENT
Start: 2021-04-09 | End: 2024-06-05

## 2021-04-09 RX ORDER — FLUTICASONE PROPIONATE 50 MCG
2 SPRAY, SUSPENSION (ML) NASAL DAILY
Qty: 48 ML | Refills: 4 | Status: SHIPPED | OUTPATIENT
Start: 2021-04-09 | End: 2022-05-27

## 2021-04-09 RX ORDER — MONTELUKAST SODIUM 10 MG/1
1 TABLET ORAL AT BEDTIME
Qty: 90 TABLET | Refills: 4 | Status: SHIPPED | OUTPATIENT
Start: 2021-04-09 | End: 2022-05-27

## 2021-04-09 RX ORDER — SERTRALINE HYDROCHLORIDE 25 MG/1
25 TABLET, FILM COATED ORAL DAILY
Qty: 90 TABLET | Refills: 4 | Status: SHIPPED | OUTPATIENT
Start: 2021-04-09 | End: 2022-05-27

## 2021-04-09 ASSESSMENT — MIFFLIN-ST. JEOR: SCORE: 1382.25

## 2021-04-09 NOTE — PATIENT INSTRUCTIONS
Call [phone] or toll free [phone] to schedule you mammogram.   [facility] [place]                Your BMI is Body mass index is Body mass index is 31.78 kg/m .     A BMI of 18.5 to 24.9 is in the healthy range. A person with a BMI of 25 to 29.9 is considered overweight, and someone with a BMI of 30 or greater is considered obese. More than two-thirds of American adults are considered overweight or obese.  Weight management is a personal decision.  If you are interested in exploring weight loss strategies, the following discussion covers the approaches that may be successful. Body mass index (BMI) is one way to tell whether you are at a healthy weight, overweight, or obese. It measures your weight in relation to your height.  Being overweight or obese increases the risk for further weight gain. Excess weight may lead to heart disease and diabetes.  Creating and following plans for healthy eating and physical activity may help you improve your health.  Weight control is part of healthy lifestyle and includes exercise, emotional health, and healthy eating habits. Careful eating habits lifelong are the mainstay of weight control. Though there are significant health benefits from weight loss, long-term weight loss with diet alone may be very difficult to achieve- studies show long-term success with dietary management alone in less than 10% of people. Attaining a healthy weight may be especially difficult to achieve in those with severe obesity. In some cases, medications, devices and surgical management might be considered.  What can you do?    Keep a food journal to help with mindful eating and finding ways to modify your diet.      Reduce the amount of processed food in your diet. Focus on adding vegetables, and lean proteins.    Reduce dietary carbohydrates. Limiting to  gm of carbohydrates per day has been shows to help boost weight loss.  If you have diabetes or are on diabetic  medications do not do this without talking to your physician or healthcare provider.    Diet combined with exercise helps maintain muscle while optimizing fat loss. Strength training is particularly important for building and maintaining muscle mass. Exercise helps reduce stress, increase energy, and improves fitness. Increasing exercise without diet control, however, may not burn enough calories to loose weight.         Start walking three days a week 10-20 minutes at a time    Work towards walking thirty minutes five days a week      Eventually, increase the speed of your walking for 1-2 minutes at time    In addition, we recommend that you review healthy lifestyles and methods for weight loss available through the National Institutes of Health patient information sites:  http://win.niddk.nih.gov/publications/index.htm    Also look into health and wellness programs that may be available through your health insurance provider, employer, local community center, or aspen club.

## 2021-04-09 NOTE — NURSING NOTE
"Initial BP (!) 144/72   Pulse 58   Temp 97.2  F (36.2  C) (Tympanic)   Resp 16   Ht 1.651 m (5' 5\")   Wt 86.6 kg (191 lb)   SpO2 97%   BMI 31.78 kg/m   Estimated body mass index is 31.78 kg/m  as calculated from the following:    Height as of this encounter: 1.651 m (5' 5\").    Weight as of this encounter: 86.6 kg (191 lb). .      "

## 2021-04-09 NOTE — PROGRESS NOTES
"    Assessment & Plan     Mild intermittent asthma with exacerbation  Uncontrolled.She had stopped controller inhaler because it was not covered.  She did the best on the fluticasone.  She had headaches with Qvar.  - albuterol (PROAIR HFA/PROVENTIL HFA/VENTOLIN HFA) 108 (90 Base) MCG/ACT inhaler; Inhale 2 puffs into the lungs every 4 hours as needed for shortness of breath / dyspnea  - albuterol (PROVENTIL) (2.5 MG/3ML) 0.083% neb solution; Take 1 vial (2.5 mg) by nebulization every 6 hours as needed for shortness of breath / dyspnea or wheezing  - fluticasone-salmeterol (ADVAIR) 100-50 MCG/DOSE inhaler; Inhale 1 puff into the lungs 2 times daily    Coronary artery disease involving native heart with angina pectoris, unspecified vessel or lesion type (H)  Stable. Refilled medication and checked labs.    - atorvastatin (LIPITOR) 40 MG tablet; Take 1 tablet (40 mg) by mouth every evening  - lisinopril (ZESTRIL) 10 MG tablet; Take 1 tablet (10 mg) by mouth daily  - metoprolol succinate ER (TOPROL XL) 25 MG 24 hr tablet; Take 1 tablet (25 mg) by mouth daily  - Lipid panel reflex to direct LDL Fasting  - Basic metabolic panel    Chronic rhinitis  Stable. Refilled medication.    - fluticasone (FLONASE) 50 MCG/ACT nasal spray; Spray 2 sprays into both nostrils daily  - montelukast (SINGULAIR) 10 MG tablet; Take 1 tablet (10 mg) by mouth At Bedtime    Anxiety  Stable. Refilled medication.    - sertraline (ZOLOFT) 25 MG tablet; Take 1 tablet (25 mg) by mouth daily    BMI 31 w risk factors  See AVS.    Encounter for screening mammogram for breast cancer  - MA Screening Digital Bilateral; Future             BMI:   Estimated body mass index is 31.78 kg/m  as calculated from the following:    Height as of this encounter: 1.651 m (5' 5\").    Weight as of this encounter: 86.6 kg (191 lb).   Weight management plan: See AVS      No follow-ups on file.    Vivek Krause MD  [facility] " NICOLAS Castillo is a 70 year old who presents for the following health issues  accompanied by her spouse:    HPI     Hyperlipidemia Follow-Up      Are you regularly taking any medication or supplement to lower your cholesterol?   Yes- atorvastatin    Are you having muscle aches or other side effects that you think could be caused by your cholesterol lowering medication?  No    Asthma Follow-Up    Was on flovent for controller but insurance didn't cover this so was switched to Qvar but gave her headaches.    Was ACT completed today?    Yes    ACT Total Scores 4/9/2021   ACT TOTAL SCORE -   ASTHMA ER VISITS -   ASTHMA HOSPITALIZATIONS -   ACT TOTAL SCORE (Goal Greater than or Equal to 20) 6   In the past 12 months, how many times did you visit the emergency room for your asthma without being admitted to the hospital? 0   In the past 12 months, how many times were you hospitalized overnight because of your asthma? 0       How many days per week do you miss taking your asthma controller medication?  0 patient reports that she does not take it due to it giving her headaches    Please describe any recent triggers for your asthma: None    Have you had any Emergency Room Visits, Urgent Care Visits, or Hospital Admissions since your last office visit?  No      How many servings of fruits and vegetables do you eat daily?  0-1    On average, how many sweetened beverages do you drink each day (Examples: soda, juice, sweet tea, etc.  Do NOT count diet or artificially sweetened beverages)?   0    How many days per week do you exercise enough to make your heart beat faster? 3 or less    How many minutes a day do you exercise enough to make your heart beat faster? 9 or less    How many days per week do you miss taking your medication? 0        Review of Systems   Constitutional, neuro, ENT, endocrine, pulmonary, cardiac, gastrointestinal, genitourinary, musculoskeletal, integument and psychiatric systems are negative,  "except as otherwise noted.       Objective    /70   Pulse 58   Temp 97.2  F (36.2  C) (Tympanic)   Resp 16   Ht 1.651 m (5' 5\")   Wt 86.6 kg (191 lb)   SpO2 97%   BMI 31.78 kg/m    Body mass index is 31.78 kg/m .  Physical Exam   GENERAL: Pleasant, well appearing female.  CV: RRR, no murmurs, rubs or gallops.  LUNGS: CTAB, normal effort.             "

## 2021-04-10 ASSESSMENT — ASTHMA QUESTIONNAIRES: ACT_TOTALSCORE: 6

## 2021-04-22 ENCOUNTER — HOSPITAL ENCOUNTER (OUTPATIENT)
Dept: MAMMOGRAPHY | Facility: CLINIC | Age: 71
Discharge: HOME | End: 2021-04-22
Attending: FAMILY MEDICINE | Admitting: FAMILY MEDICINE
Payer: COMMERCIAL

## 2021-04-22 DIAGNOSIS — Z12.31 ENCOUNTER FOR SCREENING MAMMOGRAM FOR BREAST CANCER: ICD-10-CM

## 2021-04-22 PROCEDURE — 77063 BREAST TOMOSYNTHESIS BI: CPT

## 2021-09-12 ENCOUNTER — HEALTH MAINTENANCE LETTER (OUTPATIENT)
Age: 71
End: 2021-09-12

## 2021-10-18 DIAGNOSIS — K44.9 HIATAL HERNIA: ICD-10-CM

## 2021-10-19 RX ORDER — SUCRALFATE 1 G/1
TABLET ORAL
Qty: 360 TABLET | Refills: 1 | Status: SHIPPED | OUTPATIENT
Start: 2021-10-19 | End: 2022-06-28

## 2021-10-19 NOTE — TELEPHONE ENCOUNTER
"  Requested Prescriptions   Pending Prescriptions Disp Refills     sucralfate (CARAFATE) 1 GM tablet [Pharmacy Med Name: SUCRALFATE 1GM      TAB] 360 tablet 0     Sig: TAKE 1 TABLET BY MOUTH 4 TIMES DAILY, MAY DISSOLVE IN 2 TEASPOONS OF WATER       Miscellaneous Gastrointestinal Agents Passed - 10/18/2021 10:51 AM        Passed - Recent (12 mo) or future (30 days) visit within the authorizing provider's specialty     Patient has had an office visit with the authorizing provider or a provider within the authorizing providers department within the previous 12 mos or has a future within next 30 days. See \"Patient Info\" tab in inbasket, or \"Choose Columns\" in Meds & Orders section of the refill encounter.              Passed - Medication is active on med list        Passed - Patient is 18 years of age or older             "

## 2022-03-27 DIAGNOSIS — K21.9 GASTROESOPHAGEAL REFLUX DISEASE WITHOUT ESOPHAGITIS: ICD-10-CM

## 2022-03-28 NOTE — TELEPHONE ENCOUNTER
Routing refill request to provider for review/approval because:  Osteoporosis    Pending Prescriptions:                       Disp   Refills    omeprazole (PRILOSEC) 40 MG DR capsule [Ph*180 ca*0        Sig: TAKE 1 CAPSULE BY MOUTH EVERY 12 HOURS     PPI Protocol Failed 03/27/2022 11:42 AM   Protocol Details  No diagnosis of osteoporosis on record    Not on Clopidogrel (unless Pantoprazole ordered)    Recent (12 mo) or future (30 days) visit within the authorizing provider's specialty    Medication is active on med list    Patient is age 18 or older    No active pregnacy on record    No positive pregnancy test in past 12 months        Marybeth Reza RN on 3/28/2022 at 4:01 PM

## 2022-03-30 RX ORDER — OMEPRAZOLE 40 MG/1
CAPSULE, DELAYED RELEASE ORAL
Qty: 180 CAPSULE | Refills: 0 | Status: SHIPPED | OUTPATIENT
Start: 2022-03-30 | End: 2022-09-15

## 2022-04-24 ENCOUNTER — HEALTH MAINTENANCE LETTER (OUTPATIENT)
Age: 72
End: 2022-04-24

## 2022-04-27 DIAGNOSIS — I25.119 CORONARY ARTERY DISEASE INVOLVING NATIVE HEART WITH ANGINA PECTORIS, UNSPECIFIED VESSEL OR LESION TYPE (H): ICD-10-CM

## 2022-04-27 NOTE — TELEPHONE ENCOUNTER
metoprolol succinate ER (TOPROL XL) 25 MG 24 hr tablet    lisinopril (ZESTRIL) 10 MG tablet   Patient notified of results below. Patient verbalizes understanding and has no further questions. Urology referral placed. Patient declined statin at this time.

## 2022-04-29 RX ORDER — LISINOPRIL 10 MG/1
10 TABLET ORAL DAILY
Qty: 30 TABLET | Refills: 0 | Status: SHIPPED | OUTPATIENT
Start: 2022-04-29 | End: 2022-05-27

## 2022-04-29 RX ORDER — METOPROLOL SUCCINATE 25 MG/1
25 TABLET, EXTENDED RELEASE ORAL DAILY
Qty: 30 TABLET | Refills: 0 | Status: SHIPPED | OUTPATIENT
Start: 2022-04-29 | End: 2022-05-27

## 2022-04-29 NOTE — TELEPHONE ENCOUNTER
Refills exist till July, will send only 30 as patient will need appointment and labs.    Reminder placed on pharmacy notes.    TRI Swift

## 2022-05-19 ENCOUNTER — HOSPITAL ENCOUNTER (OUTPATIENT)
Dept: MAMMOGRAPHY | Facility: CLINIC | Age: 72
Discharge: HOME | End: 2022-05-19
Attending: FAMILY MEDICINE | Admitting: FAMILY MEDICINE
Payer: COMMERCIAL

## 2022-05-19 DIAGNOSIS — Z12.31 VISIT FOR SCREENING MAMMOGRAM: ICD-10-CM

## 2022-05-19 PROCEDURE — 77067 SCR MAMMO BI INCL CAD: CPT

## 2022-05-27 ENCOUNTER — OFFICE VISIT (OUTPATIENT)
Dept: FAMILY MEDICINE | Facility: CLINIC | Age: 72
End: 2022-05-27
Payer: COMMERCIAL

## 2022-05-27 VITALS
BODY MASS INDEX: 31.65 KG/M2 | TEMPERATURE: 97.9 F | RESPIRATION RATE: 16 BRPM | HEIGHT: 65 IN | HEART RATE: 68 BPM | OXYGEN SATURATION: 96 % | DIASTOLIC BLOOD PRESSURE: 84 MMHG | SYSTOLIC BLOOD PRESSURE: 132 MMHG | WEIGHT: 190 LBS

## 2022-05-27 DIAGNOSIS — Z12.11 COLON CANCER SCREENING: ICD-10-CM

## 2022-05-27 DIAGNOSIS — I25.119 CORONARY ARTERY DISEASE INVOLVING NATIVE HEART WITH ANGINA PECTORIS, UNSPECIFIED VESSEL OR LESION TYPE (H): ICD-10-CM

## 2022-05-27 DIAGNOSIS — F41.9 ANXIETY: ICD-10-CM

## 2022-05-27 DIAGNOSIS — J31.0 CHRONIC RHINITIS: ICD-10-CM

## 2022-05-27 DIAGNOSIS — Z00.00 WELL ADULT EXAM: Primary | ICD-10-CM

## 2022-05-27 DIAGNOSIS — J45.21 MILD INTERMITTENT ASTHMA WITH EXACERBATION: ICD-10-CM

## 2022-05-27 LAB
ANION GAP SERPL CALCULATED.3IONS-SCNC: 8 MMOL/L (ref 3–14)
BUN SERPL-MCNC: 17 MG/DL (ref 7–30)
CALCIUM SERPL-MCNC: 9.2 MG/DL (ref 8.5–10.1)
CHLORIDE BLD-SCNC: 102 MMOL/L (ref 94–109)
CHOLEST SERPL-MCNC: 267 MG/DL
CO2 SERPL-SCNC: 26 MMOL/L (ref 20–32)
CREAT SERPL-MCNC: 1.02 MG/DL (ref 0.52–1.04)
FASTING STATUS PATIENT QL REPORTED: NO
GFR SERPL CREATININE-BSD FRML MDRD: 58 ML/MIN/1.73M2
GLUCOSE BLD-MCNC: 121 MG/DL (ref 70–99)
HDLC SERPL-MCNC: 42 MG/DL
LDLC SERPL CALC-MCNC: 174 MG/DL
NONHDLC SERPL-MCNC: 225 MG/DL
POTASSIUM BLD-SCNC: 4.2 MMOL/L (ref 3.4–5.3)
SODIUM SERPL-SCNC: 136 MMOL/L (ref 133–144)
TRIGL SERPL-MCNC: 253 MG/DL

## 2022-05-27 PROCEDURE — 36415 COLL VENOUS BLD VENIPUNCTURE: CPT | Performed by: FAMILY MEDICINE

## 2022-05-27 PROCEDURE — G0439 PPPS, SUBSEQ VISIT: HCPCS | Performed by: FAMILY MEDICINE

## 2022-05-27 PROCEDURE — 80048 BASIC METABOLIC PNL TOTAL CA: CPT | Performed by: FAMILY MEDICINE

## 2022-05-27 PROCEDURE — 80061 LIPID PANEL: CPT | Performed by: FAMILY MEDICINE

## 2022-05-27 PROCEDURE — 99214 OFFICE O/P EST MOD 30 MIN: CPT | Mod: 25 | Performed by: FAMILY MEDICINE

## 2022-05-27 RX ORDER — FLUTICASONE PROPIONATE 50 MCG
2 SPRAY, SUSPENSION (ML) NASAL DAILY
Qty: 48 ML | Refills: 4 | Status: SHIPPED | OUTPATIENT
Start: 2022-05-27 | End: 2023-05-25

## 2022-05-27 RX ORDER — ATORVASTATIN CALCIUM 40 MG/1
40 TABLET, FILM COATED ORAL EVERY EVENING
Qty: 90 TABLET | Refills: 4 | Status: SHIPPED | OUTPATIENT
Start: 2022-05-27 | End: 2022-07-20 | Stop reason: DRUGHIGH

## 2022-05-27 RX ORDER — MONTELUKAST SODIUM 10 MG/1
1 TABLET ORAL AT BEDTIME
Qty: 90 TABLET | Refills: 4 | Status: SHIPPED | OUTPATIENT
Start: 2022-05-27 | End: 2023-05-25

## 2022-05-27 RX ORDER — LORAZEPAM 1 MG/1
.5-1 TABLET ORAL DAILY PRN
Qty: 10 TABLET | Refills: 0 | Status: SHIPPED | OUTPATIENT
Start: 2022-05-27 | End: 2023-05-25

## 2022-05-27 RX ORDER — LISINOPRIL 10 MG/1
10 TABLET ORAL DAILY
Qty: 90 TABLET | Refills: 4 | Status: SHIPPED | OUTPATIENT
Start: 2022-05-27 | End: 2023-05-25

## 2022-05-27 RX ORDER — METOPROLOL SUCCINATE 25 MG/1
25 TABLET, EXTENDED RELEASE ORAL DAILY
Qty: 90 TABLET | Refills: 4 | Status: SHIPPED | OUTPATIENT
Start: 2022-05-27 | End: 2023-05-25

## 2022-05-27 RX ORDER — ALBUTEROL SULFATE 0.83 MG/ML
2.5 SOLUTION RESPIRATORY (INHALATION) EVERY 6 HOURS PRN
Qty: 9 ML | Refills: 11 | Status: CANCELLED | OUTPATIENT
Start: 2022-05-27

## 2022-05-27 RX ORDER — ALBUTEROL SULFATE 90 UG/1
2 AEROSOL, METERED RESPIRATORY (INHALATION) EVERY 4 HOURS PRN
Qty: 18 G | Refills: 3 | Status: SHIPPED | OUTPATIENT
Start: 2022-05-27 | End: 2023-05-25

## 2022-05-27 RX ORDER — SERTRALINE HYDROCHLORIDE 25 MG/1
25 TABLET, FILM COATED ORAL DAILY
Qty: 90 TABLET | Refills: 4 | Status: SHIPPED | OUTPATIENT
Start: 2022-05-27 | End: 2023-05-25

## 2022-05-27 ASSESSMENT — ENCOUNTER SYMPTOMS
NAUSEA: 0
EYE PAIN: 0
HEADACHES: 0
PALPITATIONS: 0
BREAST MASS: 0
HEARTBURN: 0
FEVER: 0
SORE THROAT: 0
ABDOMINAL PAIN: 0
DIARRHEA: 0
JOINT SWELLING: 0
PARESTHESIAS: 0
SHORTNESS OF BREATH: 0
MYALGIAS: 0
NERVOUS/ANXIOUS: 0
COUGH: 0
HEMATURIA: 0
DYSURIA: 0
ARTHRALGIAS: 0
CHILLS: 0
WEAKNESS: 0
DIZZINESS: 0
HEMATOCHEZIA: 0
FREQUENCY: 0
CONSTIPATION: 0

## 2022-05-27 ASSESSMENT — ASTHMA QUESTIONNAIRES
QUESTION_3 LAST FOUR WEEKS HOW OFTEN DID YOUR ASTHMA SYMPTOMS (WHEEZING, COUGHING, SHORTNESS OF BREATH, CHEST TIGHTNESS OR PAIN) WAKE YOU UP AT NIGHT OR EARLIER THAN USUAL IN THE MORNING: NOT AT ALL
ACT_TOTALSCORE: 24
QUESTION_1 LAST FOUR WEEKS HOW MUCH OF THE TIME DID YOUR ASTHMA KEEP YOU FROM GETTING AS MUCH DONE AT WORK, SCHOOL OR AT HOME: NONE OF THE TIME
QUESTION_4 LAST FOUR WEEKS HOW OFTEN HAVE YOU USED YOUR RESCUE INHALER OR NEBULIZER MEDICATION (SUCH AS ALBUTEROL): ONCE A WEEK OR LESS
ACT_TOTALSCORE: 24
QUESTION_5 LAST FOUR WEEKS HOW WOULD YOU RATE YOUR ASTHMA CONTROL: COMPLETELY CONTROLLED
QUESTION_2 LAST FOUR WEEKS HOW OFTEN HAVE YOU HAD SHORTNESS OF BREATH: NOT AT ALL

## 2022-05-27 ASSESSMENT — ANXIETY QUESTIONNAIRES
GAD7 TOTAL SCORE: 0
1. FEELING NERVOUS, ANXIOUS, OR ON EDGE: NOT AT ALL
6. BECOMING EASILY ANNOYED OR IRRITABLE: NOT AT ALL
5. BEING SO RESTLESS THAT IT IS HARD TO SIT STILL: NOT AT ALL
3. WORRYING TOO MUCH ABOUT DIFFERENT THINGS: NOT AT ALL
2. NOT BEING ABLE TO STOP OR CONTROL WORRYING: NOT AT ALL
7. FEELING AFRAID AS IF SOMETHING AWFUL MIGHT HAPPEN: NOT AT ALL
IF YOU CHECKED OFF ANY PROBLEMS ON THIS QUESTIONNAIRE, HOW DIFFICULT HAVE THESE PROBLEMS MADE IT FOR YOU TO DO YOUR WORK, TAKE CARE OF THINGS AT HOME, OR GET ALONG WITH OTHER PEOPLE: NOT DIFFICULT AT ALL
GAD7 TOTAL SCORE: 0

## 2022-05-27 ASSESSMENT — PATIENT HEALTH QUESTIONNAIRE - PHQ9: 5. POOR APPETITE OR OVEREATING: NOT AT ALL

## 2022-05-27 ASSESSMENT — PAIN SCALES - GENERAL: PAINLEVEL: NO PAIN (0)

## 2022-05-27 ASSESSMENT — ACTIVITIES OF DAILY LIVING (ADL): CURRENT_FUNCTION: NO ASSISTANCE NEEDED

## 2022-05-27 NOTE — PATIENT INSTRUCTIONS
Shingrix is the new shingles vaccine. It is typically a pharmacy benefit under most insurances. The [facility] pharmacy can check your insurance coverage.    Adacle (tetanus booster) us due    You could consider the pfizer Covid booster instead of Moderna.

## 2022-05-27 NOTE — PROGRESS NOTES
"SUBJECTIVE:   Anna Dahl is a 71 year old female who presents for Preventive Visit.      Patient has been advised of split billing requirements and indicates understanding: Yes  Are you in the first 12 months of your Medicare coverage?  No    Healthy Habits:     In general, how would you rate your overall health?  Good    Frequency of exercise:  None    Do you usually eat at least 4 servings of fruit and vegetables a day, include whole grains    & fiber and avoid regularly eating high fat or \"junk\" foods?  No    Taking medications regularly:  Yes    Medication side effects:  Not applicable    Ability to successfully perform activities of daily living:  No assistance needed    Home Safety:  No safety concerns identified    Hearing Impairment:  No hearing concerns    In the past 6 months, have you been bothered by leaking of urine?  No    In general, how would you rate your overall mental or emotional health?  Good      PHQ-2 Total Score: 0    Additional concerns today:  No    Do you feel safe in your environment? Yes    Have you ever done Advance Care Planning? (For example, a Health Directive, POLST, or a discussion with a medical provider or your loved ones about your wishes): Yes, advance care planning is on file.       Fall risk  Fallen 2 or more times in the past year?: No  Any fall with injury in the past year?: No    Cognitive Screening   1) Repeat 3 items (Leader, Season, Table)    2) Clock draw: ABNORMAL unable to draw hands correctly  3) 3 item recall: Recalls 1 object   Results: ABNORMAL clock, 1-2 items recalled: PROBABLE COGNITIVE IMPAIRMENT, **INFORM PROVIDER**    Mini-CogTM Copyright HOWIE Diaz. Licensed by the author for use in [facility]; reprinted with permission (lilia@.[facility]). All rights reserved.      Do you have sleep apnea, excessive snoring or daytime drowsiness?: no    Reviewed and updated as needed this visit by clinical staff   Tobacco  Allergies  Meds   Med Hx  Surg Hx "  Fam Hx  Soc Hx          Reviewed and updated as needed this visit by Provider                   Social History     Tobacco Use     Smoking status: Never Smoker     Smokeless tobacco: Never Used   Substance Use Topics     Alcohol use: Yes     Comment: moderate couple drinks on weekends         Alcohol Use 5/27/2022   Prescreen: >3 drinks/day or >7 drinks/week? No   Prescreen: >3 drinks/day or >7 drinks/week? -           Hyperlipidemia Follow-Up      Are you regularly taking any medication or supplement to lower your cholesterol?   Yes- atorvastatin    Are you having muscle aches or other side effects that you think could be caused by your cholesterol lowering medication?  No    Hypertension Follow-up      Do you check your blood pressure regularly outside of the clinic? No     Are you following a low salt diet? No    Are your blood pressures ever more than 140 on the top number (systolic) OR more   than 90 on the bottom number (diastolic), for example 140/90? No    Depression and Anxiety Follow-Up    How are you doing with your depression since your last visit? Improved     How are you doing with your anxiety since your last visit?  Improved     Are you having other symptoms that might be associated with depression or anxiety? No    Have you had a significant life event? No     Do you have any concerns with your use of alcohol or other drugs? No    Social History     Tobacco Use     Smoking status: Never Smoker     Smokeless tobacco: Never Used   Substance Use Topics     Alcohol use: Yes     Comment: moderate couple drinks on weekends     Drug use: No     PHQ 10/13/2015 4/27/2018 4/24/2019   PHQ-9 Total Score 0 0 0   Q9: Thoughts of better off dead/self-harm past 2 weeks Not at all Not at all Not at all     DEBBIE-7 SCORE 4/27/2018 4/24/2019 5/27/2022   Total Score - - -   Total Score 0 1 0     Last PHQ-9 4/24/2019   1.  Little interest or pleasure in doing things 0   2.  Feeling down, depressed, or hopeless 0   3.   Trouble falling or staying asleep, or sleeping too much 0   4.  Feeling tired or having little energy 0   5.  Poor appetite or overeating 0   6.  Feeling bad about yourself 0   7.  Trouble concentrating 0   8.  Moving slowly or restless 0   Q9: Thoughts of better off dead/self-harm past 2 weeks 0   PHQ-9 Total Score 0     DEBBIE-7  5/27/2022   1. Feeling nervous, anxious, or on edge 0   2. Not being able to stop or control worrying 0   3. Worrying too much about different things 0   4. Trouble relaxing 0   5. Being so restless that it is hard to sit still 0   6. Becoming easily annoyed or irritable 0   7. Feeling afraid, as if something awful might happen 0   DEBBIE-7 Total Score 0   If you checked any problems, how difficult have they made it for you to do your work, take care of things at home, or get along with other people? Not difficult at all       Suicide Assessment Five-step Evaluation and Treatment (SAFE-T)      Current providers sharing in care for this patient include:   Patient Care Team:  Vivek Krause MD as PCP - General (Family Practice)  Vivek Krause MD as Assigned PCP    The following health maintenance items are reviewed in Epic and correct as of today:  Health Maintenance Due   Topic Date Due     ANNUAL REVIEW OF  ORDERS  Never done     ZOSTER IMMUNIZATION (1 of 2) Never done     MEDICARE ANNUAL WELLNESS VISIT  06/10/2015     ASTHMA ACTION PLAN  10/13/2016     COLORECTAL CANCER SCREENING  06/16/2020     DTAP/TDAP/TD IMMUNIZATION (2 - Td or Tdap) 08/31/2020     COVID-19 Vaccine (3 - Booster for Moderna series) 08/24/2021     ADVANCE CARE PLANNING  05/31/2022     Labs reviewed in EPIC  Patient Active Problem List   Diagnosis     Nasal polyp     Hiatal hernia     Colon polyps     GERD (gastroesophageal reflux disease)     Hyperlipidemia LDL goal <100     Advanced directives, counseling/discussion     Mild intermittent asthma with exacerbation     Chronic sinusitis     Non-ST  elevation myocardial infarction (NSTEMI), initial care episode (H)     Coronary artery disease involving native heart with angina pectoris, unspecified vessel or lesion type (H)     BCC (basal cell carcinoma), face     Anxiety     Osteoporosis     S/P total knee arthroplasty     Elevated glucose     Acute kidney failure (H)     Postoperative anemia     Health Care Home     Chronic rhinitis     Past Surgical History:   Procedure Laterality Date     ARTHROPLASTY KNEE Right 06/01/2017    Procedure: ARTHROPLASTY KNEE;  Right total knee arthroplasty;  Surgeon: Colin Krause MD;  Location: [facility]     ENT SURGERY       ESOPHAGOSCOPY, GASTROSCOPY, DUODENOSCOPY (EGD), COMBINED  02/06/2013    Procedure: COMBINED ESOPHAGOSCOPY, GASTROSCOPY, DUODENOSCOPY (EGD), BIOPSY SINGLE OR MULTIPLE;  Gastroscopy;  Surgeon: Yves Mills MD;  Location: [facility]     ETHMOIDECTOMY  01/05/2011    ETHMOIDECTOMY performed by ADDY SERRANO at [facility]     MOHS MICROGRAPHIC PROCEDURE       MOHS MICROGRAPHIC PROCEDURE       SURGICAL HISTORY OF -   06/01/2003    Bilateral total ethmoidecomy with bilateral maxillary antrostomies with removal of polyps, bilateral frontal sinusotomies, and left sphenoidotomy     SURGICAL HISTORY OF -   01/01/1985    Bilateral intranasal polypectomy with bilateraly nasal antral punctures      SURGICAL HISTORY OF -   01/01/2004    Mohs micrographic surgery, fresh tissue technique with complex wound repair. below right ear at angle of jawline.     TUBAL LIGATION      tubal ligation       Social History     Tobacco Use     Smoking status: Never Smoker     Smokeless tobacco: Never Used   Substance Use Topics     Alcohol use: Yes     Comment: moderate couple drinks on weekends     Family History   Problem Relation Age of Onset     Heart Disease Father         heart surgery     Lipids Father      Alzheimer Disease Mother      Lipids Brother      Lipids Brother          Current Outpatient Medications    Medication Sig Dispense Refill     acetaminophen (TYLENOL) 325 MG tablet Take 2 tablets by mouth every 4 hours as needed. 250 tablet      albuterol (PROAIR HFA/PROVENTIL HFA/VENTOLIN HFA) 108 (90 Base) MCG/ACT inhaler Inhale 2 puffs into the lungs every 4 hours as needed for shortness of breath / dyspnea 18 g 3     albuterol (PROVENTIL) (2.5 MG/3ML) 0.083% neb solution Take 1 vial (2.5 mg) by nebulization every 6 hours as needed for shortness of breath / dyspnea or wheezing 9 mL 11     aspirin (ASA) 81 MG EC tablet Take 81 mg by mouth every evening       atorvastatin (LIPITOR) 40 MG tablet Take 1 tablet (40 mg) by mouth every evening 90 tablet 4     fluticasone (FLONASE) 50 MCG/ACT nasal spray Spray 2 sprays into both nostrils daily 48 mL 4     fluticasone-salmeterol (ADVAIR) 100-50 MCG/DOSE inhaler Inhale 1 puff into the lungs 2 times daily 60 each 11     lisinopril (ZESTRIL) 10 MG tablet Take 1 tablet (10 mg) by mouth daily 90 tablet 4     LORazepam (ATIVAN) 1 MG tablet Take 0.5-1 tablets (0.5-1 mg) by mouth daily as needed for anxiety 10 tablet 0     metoprolol succinate ER (TOPROL XL) 25 MG 24 hr tablet Take 1 tablet (25 mg) by mouth daily 90 tablet 4     montelukast (SINGULAIR) 10 MG tablet Take 1 tablet (10 mg) by mouth At Bedtime 90 tablet 4     nitroGLYcerin (NITROSTAT) 0.4 MG sublingual tablet Place 1 tablet (0.4 mg) under the tongue every 5 minutes as needed for chest pain 25 tablet 3     omeprazole (PRILOSEC) 40 MG DR capsule TAKE 1 CAPSULE BY MOUTH EVERY 12 HOURS 180 capsule 0     sertraline (ZOLOFT) 25 MG tablet Take 1 tablet (25 mg) by mouth daily 90 tablet 4     sucralfate (CARAFATE) 1 GM tablet TAKE 1 TABLET BY MOUTH 4 TIMES DAILY, MAY DISSOLVE IN 2 TEASPOONS OF WATER 360 tablet 1     alendronate (FOSAMAX) 70 MG tablet Take 1 tablet (70 mg) by mouth every 7 days Take with over 8 ounces water and stay upright for at least 30 minutes after dose.  Take at least 60 minutes before breakfast. (Patient  "not taking: No sig reported) 13 tablet 3     Allergies   Allergen Reactions     Amoxicillin Anaphylaxis     Rosuvastatin Other (See Comments)     Severe headache, backache     Beclomethasone Other (See Comments)     Headache from the Qvar Inhaler     Reglan [Metoclopramide Hcl] Visual Disturbance     Disoriented, hallucinations     Mammogram Screening: Mammogram Screening: Recommended mammography every 1-2 years with patient discussion and risk factor consideration        Review of Systems   Constitutional: Negative for chills and fever.   HENT: Negative for congestion, ear pain, hearing loss and sore throat.    Eyes: Negative for pain and visual disturbance.   Respiratory: Negative for cough and shortness of breath.    Cardiovascular: Negative for chest pain, palpitations and peripheral edema.   Gastrointestinal: Negative for abdominal pain, constipation, diarrhea, heartburn, hematochezia and nausea.   Breasts:  Negative for tenderness, breast mass and discharge.   Genitourinary: Negative for dysuria, frequency, genital sores, hematuria, pelvic pain, urgency, vaginal bleeding and vaginal discharge.   Musculoskeletal: Negative for arthralgias, joint swelling and myalgias.   Neurological: Negative for dizziness, weakness, headaches and paresthesias.   Psychiatric/Behavioral: Negative for mood changes. The patient is not nervous/anxious.          OBJECTIVE:   /84   Pulse 68   Temp 97.9  F (36.6  C) (Tympanic)   Resp 16   Ht 1.651 m (5' 5\")   Wt 86.2 kg (190 lb)   SpO2 96%   BMI 31.62 kg/m   Estimated body mass index is 31.62 kg/m  as calculated from the following:    Height as of this encounter: 1.651 m (5' 5\").    Weight as of this encounter: 86.2 kg (190 lb).  Physical Exam  GENERAL APPEARANCE: healthy, alert and no distress  EYES: Eyes grossly normal to inspection, PERRL and conjunctivae and sclerae normal  HENT: ear canals and TM's normal  NECK: no adenopathy, no asymmetry, masses, or scars and " thyroid normal to palpation  RESP: lungs clear to auscultation - no rales, rhonchi or wheezes  BREAST: normal without masses, tenderness or nipple discharge and no palpable axillary masses or adenopathy  CV: regular rate and rhythm, normal S1 S2, no S3 or S4, no murmur, click or rub, no peripheral edema and peripheral pulses strong  ABDOMEN: soft, nontender, no hepatosplenomegaly, no masses and bowel sounds normal  MS: no musculoskeletal defects are noted and gait is age appropriate without ataxia  SKIN: no suspicious lesions or rashes  NEURO: Normal strength and tone, sensory exam grossly normal, mentation intact and speech normal  PSYCH: mentation appears normal and affect normal/bright    Diagnostic Test Results:  Labs reviewed in Epic    ASSESSMENT / PLAN:   Well adult exam    Coronary artery disease involving native heart with angina pectoris, unspecified vessel or lesion type (H)  Well controlled. Refilled medication. Check labs.    - metoprolol succinate ER (TOPROL XL) 25 MG 24 hr tablet; Take 1 tablet (25 mg) by mouth daily  - lisinopril (ZESTRIL) 10 MG tablet; Take 1 tablet (10 mg) by mouth daily  - atorvastatin (LIPITOR) 40 MG tablet; Take 1 tablet (40 mg) by mouth every evening  - Lipid panel reflex to direct LDL Fasting; Future  - Basic metabolic panel; Future  - Lipid panel reflex to direct LDL Fasting  - Basic metabolic panel    Anxiety  Well controlled. Refilled medication.     - sertraline (ZOLOFT) 25 MG tablet; Take 1 tablet (25 mg) by mouth daily  - LORazepam (ATIVAN) 1 MG tablet; Take 0.5-1 tablets (0.5-1 mg) by mouth daily as needed for anxiety    Chronic rhinitis  Well controlled. Refilled medication.     - montelukast (SINGULAIR) 10 MG tablet; Take 1 tablet (10 mg) by mouth At Bedtime  - fluticasone (FLONASE) 50 MCG/ACT nasal spray; Spray 2 sprays into both nostrils daily    Mild intermittent asthma with exacerbation  Well controlled. Refilled medication.     - albuterol (PROAIR HFA/PROVENTIL  "HFA/VENTOLIN HFA) 108 (90 Base) MCG/ACT inhaler; Inhale 2 puffs into the lungs every 4 hours as needed for shortness of breath / dyspnea    Colon cancer screening  - [employer])        Patient has been advised of split billing requirements and indicates understanding: Yes    COUNSELING:  Reviewed preventive health counseling, as reflected in patient instructions    Estimated body mass index is 31.62 kg/m  as calculated from the following:    Height as of this encounter: 1.651 m (5' 5\").    Weight as of this encounter: 86.2 kg (190 lb).    Weight management plan: See AVS    She reports that she has never smoked. She has never used smokeless tobacco.      Appropriate preventive services were discussed with this patient, including applicable screening as appropriate for cardiovascular disease, diabetes, osteopenia/osteoporosis, and glaucoma.  As appropriate for age/gender, discussed screening for colorectal cancer, prostate cancer, breast cancer, and cervical cancer. Checklist reviewing preventive services available has been given to the patient.    Reviewed patients plan of care and provided an AVS. The Intermediate Care Plan ( asthma action plan, low back pain action plan, and migraine action plan) for Anna meets the Care Plan requirement. This Care Plan has been established and reviewed with the Patient.    Counseling Resources:  ATP IV Guidelines  Pooled Cohorts Equation Calculator  Breast Cancer Risk Calculator  Breast Cancer: Medication to Reduce Risk  FRAX Risk Assessment  ICSI Preventive Guidelines  Dietary Guidelines for Americans, 2010  USDA's MyPlate  ASA Prophylaxis  Lung CA Screening    Vivek Krause MD  [facility]    Identified Health Risks:  "

## 2022-05-27 NOTE — NURSING NOTE
"Initial /84   Pulse 68   Temp 97.9  F (36.6  C) (Tympanic)   Resp 16   Ht 1.651 m (5' 5\")   Wt 86.2 kg (190 lb)   SpO2 96%   BMI 31.62 kg/m   Estimated body mass index is 31.62 kg/m  as calculated from the following:    Height as of this encounter: 1.651 m (5' 5\").    Weight as of this encounter: 86.2 kg (190 lb). .      "

## 2022-06-02 ENCOUNTER — TELEPHONE (OUTPATIENT)
Dept: FAMILY MEDICINE | Facility: CLINIC | Age: 72
End: 2022-06-02
Payer: COMMERCIAL

## 2022-06-02 DIAGNOSIS — E78.5 HYPERLIPIDEMIA LDL GOAL <100: Primary | ICD-10-CM

## 2022-06-02 NOTE — TELEPHONE ENCOUNTER
Reason for Call:  Request for results:    Name of test or procedure: Pt's spouse calling concerned about pt's abnormal lab results that were recently released to My Chart.  He wants Dr. Krause asked what can be done to lower levels, so pt doesn't have another heart attack.  Please call patient and advise.      Date of test of procedure: 5/27/22    Location of the test or procedure: [facility]    OK to leave the result message on voice mail or with a family member? YES    Phone number Patient can be reached at:  Cell number on file:    Telephone Information:   Mobile [phone]     Additional comments:     Call taken on 6/2/2022 at 10:02 AM by Christina George

## 2022-06-02 NOTE — TELEPHONE ENCOUNTER
Patient would like to go ahead with the repeat labs. RN was able to schedule with lab on Monday.     Rody Snyder, ROBN, RN, PHN

## 2022-06-02 NOTE — TELEPHONE ENCOUNTER
The difference between fasting and nonfasting is not so great that I think it would make much difference but if she would like to come in for a formal fasting cholesterol that certainly an option.  I did put a future cholesterol lab on file.

## 2022-06-02 NOTE — TELEPHONE ENCOUNTER
"RN called and spoke to Anna and she states that those labs were not fasting, does Provider want her to come back in and repeat with fast labs?    She wants to make sure before increasing her current dose of Lipitor of 40mg to 80mg and yes she has been taking this \"faithfully\" as the patient put it.     Rody Snyder, ROBN, RN, PHN    "

## 2022-06-06 ENCOUNTER — LAB (OUTPATIENT)
Dept: LAB | Facility: CLINIC | Age: 72
End: 2022-06-06
Payer: COMMERCIAL

## 2022-06-06 DIAGNOSIS — E78.5 HYPERLIPIDEMIA LDL GOAL <100: ICD-10-CM

## 2022-06-06 LAB
CHOLEST SERPL-MCNC: 187 MG/DL
FASTING STATUS PATIENT QL REPORTED: YES
HDLC SERPL-MCNC: 39 MG/DL
LDLC SERPL CALC-MCNC: 122 MG/DL
NONHDLC SERPL-MCNC: 148 MG/DL
TRIGL SERPL-MCNC: 132 MG/DL

## 2022-06-06 PROCEDURE — 36415 COLL VENOUS BLD VENIPUNCTURE: CPT

## 2022-06-06 PROCEDURE — 80061 LIPID PANEL: CPT

## 2022-06-06 NOTE — LETTER
Sheridan 15, 2022      Anna Dahl  [address] [facility]  [place] [address]        Dear ,    We are writing to inform you of your test results.    Repeat cholesterol did come down.  Still above optimal and quite a bit higher than last year, so I would still recommend increasing lipitor to 80mg. Let me know your thoughts on that.    Resulted Orders   Lipid panel reflex to direct LDL Fasting   Result Value Ref Range    Cholesterol 187 <200 mg/dL    Triglycerides 132 <150 mg/dL    Direct Measure HDL 39 (L) >=50 mg/dL    LDL Cholesterol Calculated 122 (H) <=100 mg/dL    Non HDL Cholesterol 148 (H) <130 mg/dL    Patient Fasting > 8hrs? Yes     Narrative    Cholesterol  Desirable:  <200 mg/dL    Triglycerides  Normal:  Less than 150 mg/dL  Borderline High:  150-199 mg/dL  High:  200-499 mg/dL  Very High:  Greater than or equal to 500 mg/dL    Direct Measure HDL  Female:  Greater than or equal to 50 mg/dL   Male:  Greater than or equal to 40 mg/dL    LDL Cholesterol  Desirable:  <100mg/dL  Above Desirable:  100-129 mg/dL   Borderline High:  130-159 mg/dL   High:  160-189 mg/dL   Very High:  >= 190 mg/dL    Non HDL Cholesterol  Desirable:  130 mg/dL  Above Desirable:  130-159 mg/dL  Borderline High:  160-189 mg/dL  High:  190-219 mg/dL  Very High:  Greater than or equal to 220 mg/dL       If you have any questions or concerns, please call the clinic at the number listed above.       Sincerely,      Vivek Krause MD

## 2022-06-09 PROBLEM — E66.01 MORBID OBESITY (H): Status: RESOLVED | Noted: 2019-05-06 | Resolved: 2022-06-09

## 2022-06-28 DIAGNOSIS — K44.9 HIATAL HERNIA: ICD-10-CM

## 2022-06-28 RX ORDER — SUCRALFATE 1 G/1
TABLET ORAL
Qty: 360 TABLET | Refills: 0 | Status: SHIPPED | OUTPATIENT
Start: 2022-06-28 | End: 2022-11-09

## 2022-07-20 DIAGNOSIS — E78.5 HYPERLIPIDEMIA LDL GOAL <100: Primary | ICD-10-CM

## 2022-07-20 RX ORDER — ATORVASTATIN CALCIUM 80 MG/1
80 TABLET, FILM COATED ORAL DAILY
Qty: 90 TABLET | Refills: 4 | Status: SHIPPED | OUTPATIENT
Start: 2022-07-20 | End: 2023-05-25

## 2022-07-20 NOTE — TELEPHONE ENCOUNTER
Contacted patient.  States she is okay with increasing Atorvastatin to 80 mg.   Pended  She is almost out of 40 mg   40 mg dose discontinued from med list      Routing refill request to provider for review/approval because:  Dose change        Marybeth Reza RN on 7/20/2022 at 10:50 AM

## 2022-07-20 NOTE — TELEPHONE ENCOUNTER
Reason for Call:  Medication or medication refill:    Do you use a [facility]? No  Name of the pharmacy and phone number for the current request: [facility]    Name of the medication requested: atorvastatin (LIPITOR)    Other request: Pt calling about letter Dr Krause    Can we leave a detailed message on this number? YES    Phone number patient can be reached at: Home number on file [phone] (home)    Best Time: any    Call taken on 7/20/2022 at 10:30 AM by Hoda Hannon

## 2022-11-08 DIAGNOSIS — K21.9 GASTROESOPHAGEAL REFLUX DISEASE WITHOUT ESOPHAGITIS: Primary | ICD-10-CM

## 2022-11-08 DIAGNOSIS — K44.9 HIATAL HERNIA: ICD-10-CM

## 2022-11-08 NOTE — LETTER
[facility]  [address]  [facility] [place] [address]  [phone]  November 10, 2022    Anna Dahl  [address] [place] [address]    Dear Anna,    We care about your health and have reviewed your health plan including your medical conditions, medication list, and lab results.  Based on this review, it is recommended that you follow up regarding the following health topic(s):     We did try to call you and was unable to leave you a message.  You are due for a repeat EGD, since it's been almost 10 years since your last one.  Dr. Krause has placed the order.  Please call [phone] to schedule this test.    Thank you for trusting [facility] and we appreciate the opportunity to serve you.  We look forward to supporting your healthcare needs in the future.    Healthy Regards,      Your Health Care Team  [facility]

## 2022-11-09 RX ORDER — SUCRALFATE 1 G/1
TABLET ORAL
Qty: 360 TABLET | Refills: 0 | Status: SHIPPED | OUTPATIENT
Start: 2022-11-09 | End: 2023-06-06

## 2022-11-10 NOTE — TELEPHONE ENCOUNTER
She is due for repeat EGD as it's been almost 10 years since her last. Order placed. Please help her schedule this.    Prescription faxed to pharmacy.

## 2022-11-19 ENCOUNTER — HEALTH MAINTENANCE LETTER (OUTPATIENT)
Age: 72
End: 2022-11-19

## 2022-11-21 ENCOUNTER — TELEPHONE (OUTPATIENT)
Dept: FAMILY MEDICINE | Facility: CLINIC | Age: 72
End: 2022-11-21

## 2022-11-21 NOTE — TELEPHONE ENCOUNTER
Pharmacy note regarding sucralfate (CARAFATE) 1 GM tablet:  Backordered for several weeks. Alternative for patient?

## 2022-11-25 NOTE — TELEPHONE ENCOUNTER
Other than proton pump inhibitors which she is already on there really is not a substitution for Carafate that I know of.  I would recommend checking other pharmacies to see if they have availability.

## 2022-11-28 NOTE — TELEPHONE ENCOUNTER
Pt called back and found out that the Carafate med can be filled at [facility] and it will be filled faster if we call and give them verbal order.  Please call patient and advise.

## 2022-11-28 NOTE — TELEPHONE ENCOUNTER
Pt was called and informed that her pharmacy doesn't have carafate available and there are not any substitutions. Pt will call other pharmacies to see who has it available and will call clinic back to let us know where to send prescription.   Jeanette Cherry RN

## 2022-11-28 NOTE — TELEPHONE ENCOUNTER
Prescription called into [email or web address] message line. Pt was called and informed of that. Jeanette Cherry RN

## 2023-04-27 ENCOUNTER — PATIENT OUTREACH (OUTPATIENT)
Dept: CARE COORDINATION | Facility: CLINIC | Age: 73
End: 2023-04-27
Payer: COMMERCIAL

## 2023-05-11 ENCOUNTER — PATIENT OUTREACH (OUTPATIENT)
Dept: CARE COORDINATION | Facility: CLINIC | Age: 73
End: 2023-05-11
Payer: COMMERCIAL

## 2023-05-25 ENCOUNTER — VIRTUAL VISIT (OUTPATIENT)
Dept: FAMILY MEDICINE | Facility: CLINIC | Age: 73
End: 2023-05-25
Payer: COMMERCIAL

## 2023-05-25 DIAGNOSIS — J45.21 MILD INTERMITTENT ASTHMA WITH EXACERBATION: ICD-10-CM

## 2023-05-25 DIAGNOSIS — K21.9 GASTROESOPHAGEAL REFLUX DISEASE WITHOUT ESOPHAGITIS: ICD-10-CM

## 2023-05-25 DIAGNOSIS — F41.9 ANXIETY: ICD-10-CM

## 2023-05-25 DIAGNOSIS — I25.119 CORONARY ARTERY DISEASE INVOLVING NATIVE HEART WITH ANGINA PECTORIS, UNSPECIFIED VESSEL OR LESION TYPE (H): ICD-10-CM

## 2023-05-25 DIAGNOSIS — Z12.11 COLON CANCER SCREENING: ICD-10-CM

## 2023-05-25 DIAGNOSIS — E78.5 HYPERLIPIDEMIA LDL GOAL <100: Primary | ICD-10-CM

## 2023-05-25 DIAGNOSIS — J31.0 CHRONIC RHINITIS: ICD-10-CM

## 2023-05-25 PROCEDURE — 99442 PR PHYSICIAN TELEPHONE EVALUATION 11-20 MIN: CPT | Mod: 95 | Performed by: FAMILY MEDICINE

## 2023-05-25 RX ORDER — METOPROLOL SUCCINATE 25 MG/1
25 TABLET, EXTENDED RELEASE ORAL DAILY
Qty: 90 TABLET | Refills: 4 | Status: SHIPPED | OUTPATIENT
Start: 2023-05-25 | End: 2024-06-05

## 2023-05-25 RX ORDER — MONTELUKAST SODIUM 10 MG/1
1 TABLET ORAL AT BEDTIME
Qty: 90 TABLET | Refills: 4 | Status: SHIPPED | OUTPATIENT
Start: 2023-05-25 | End: 2024-06-05

## 2023-05-25 RX ORDER — FLUTICASONE PROPIONATE AND SALMETEROL 100; 50 UG/1; UG/1
1 POWDER RESPIRATORY (INHALATION) 2 TIMES DAILY
Qty: 60 EACH | Refills: 4 | Status: SHIPPED | OUTPATIENT
Start: 2023-05-25

## 2023-05-25 RX ORDER — SERTRALINE HYDROCHLORIDE 25 MG/1
25 TABLET, FILM COATED ORAL DAILY
Qty: 90 TABLET | Refills: 4 | Status: SHIPPED | OUTPATIENT
Start: 2023-05-25 | End: 2024-06-05

## 2023-05-25 RX ORDER — ATORVASTATIN CALCIUM 80 MG/1
80 TABLET, FILM COATED ORAL DAILY
Qty: 90 TABLET | Refills: 4 | Status: SHIPPED | OUTPATIENT
Start: 2023-05-25 | End: 2024-06-05

## 2023-05-25 RX ORDER — LORAZEPAM 1 MG/1
.5-1 TABLET ORAL DAILY PRN
Qty: 10 TABLET | Refills: 0 | Status: SHIPPED | OUTPATIENT
Start: 2023-05-25 | End: 2024-06-05

## 2023-05-25 RX ORDER — ALBUTEROL SULFATE 90 UG/1
2 AEROSOL, METERED RESPIRATORY (INHALATION) EVERY 4 HOURS PRN
Qty: 18 G | Refills: 3 | Status: SHIPPED | OUTPATIENT
Start: 2023-05-25 | End: 2024-06-05

## 2023-05-25 RX ORDER — FLUTICASONE PROPIONATE 50 MCG
2 SPRAY, SUSPENSION (ML) NASAL DAILY
Qty: 48 ML | Refills: 4 | Status: SHIPPED | OUTPATIENT
Start: 2023-05-25 | End: 2024-06-05

## 2023-05-25 RX ORDER — OMEPRAZOLE 40 MG/1
40 CAPSULE, DELAYED RELEASE ORAL EVERY 12 HOURS
Qty: 180 CAPSULE | Refills: 4 | Status: SHIPPED | OUTPATIENT
Start: 2023-05-25 | End: 2024-06-05

## 2023-05-25 RX ORDER — LISINOPRIL 10 MG/1
10 TABLET ORAL DAILY
Qty: 90 TABLET | Refills: 4 | Status: SHIPPED | OUTPATIENT
Start: 2023-05-25 | End: 2024-06-05

## 2023-05-25 NOTE — PROGRESS NOTES
Anna is a 72 year old who is being evaluated via a billable telephone visit.      What phone number would you like to be contacted at? [phone]  How would you like to obtain your AVS? Yoon  Distant Location (provider location):  On-site    Assessment & Plan     Hyperlipidemia LDL goal <100  Well controlled. Refilled medication. Check labs.    - atorvastatin (LIPITOR) 80 MG tablet; Take 1 tablet (80 mg) by mouth daily  - Lipid panel reflex to direct LDL Fasting; Future    Coronary artery disease involving native heart with angina pectoris, unspecified vessel or lesion type (H)  Well controlled. Refilled medication. Check labs.  However, we do not have any recent clinic blood pressures.  She does have a home cuff.  Has not checked recently.  Advised her to check at home and contact us with blood pressures in a few weeks.  - lisinopril (ZESTRIL) 10 MG tablet; Take 1 tablet (10 mg) by mouth daily  - metoprolol succinate ER (TOPROL XL) 25 MG 24 hr tablet; Take 1 tablet (25 mg) by mouth daily  - Basic metabolic panel; Future    Mild intermittent asthma with exacerbation  Stable. Refilled medication.    - albuterol (PROAIR HFA/PROVENTIL HFA/VENTOLIN HFA) 108 (90 Base) MCG/ACT inhaler; Inhale 2 puffs into the lungs every 4 hours as needed for shortness of breath  - fluticasone-salmeterol (ADVAIR DISKUS) 100-50 MCG/ACT inhaler; Inhale 1 puff into the lungs 2 times daily    Anxiety  Well controlled. Refilled medication.     - sertraline (ZOLOFT) 25 MG tablet; Take 1 tablet (25 mg) by mouth daily  - LORazepam (ATIVAN) 1 MG tablet; Take 0.5-1 tablets (0.5-1 mg) by mouth daily as needed for anxiety    Gastroesophageal reflux disease without esophagitis  Well controlled. Refilled medication.     - omeprazole (PRILOSEC) 40 MG DR capsule; Take 1 capsule (40 mg) by mouth every 12 hours    Chronic rhinitis  Well controlled. Refilled medication.     - montelukast (SINGULAIR) 10 MG tablet; Take 1 tablet (10 mg) by mouth At  "Bedtime  - fluticasone (FLONASE) 50 MCG/ACT nasal spray; Spray 2 sprays into both nostrils daily    Colon cancer screening  Her insurance does not cover Cologuard testing.  They do however cover FIT testing.  - Fecal colorectal cancer screen (FIT); Future       BMI:   Estimated body mass index is 31.62 kg/m  as calculated from the following:    Height as of 5/27/22: 1.651 m (5' 5\").    Weight as of 5/27/22: 86.2 kg (190 lb).           Vivek Krause MD  [facility]    Anne Marie Castillo is a 72 year old, presenting for the following health issues:  No chief complaint on file.         View : No data to display.              HPI     Hyperlipidemia Follow-Up      Are you regularly taking any medication or supplement to lower your cholesterol?   Yes- atorvastatin    Are you having muscle aches or other side effects that you think could be caused by your cholesterol lowering medication?  No    Hypertension Follow-up      Do you check your blood pressure regularly outside of the clinic? No     Are you following a low salt diet? No    Are your blood pressures ever more than 140 on the top number (systolic) OR more   than 90 on the bottom number (diastolic), for example 140/90? No    Anxiety Follow-Up    How are you doing with your anxiety since your last visit? Improved     Are you having other symptoms that might be associated with anxiety? No    Have you had a significant life event? No     Are you feeling depressed? No    Do you have any concerns with your use of alcohol or other drugs? No    Social History     Tobacco Use     Smoking status: Never     Smokeless tobacco: Never   Substance Use Topics     Alcohol use: Yes     Comment: moderate couple drinks on weekends     Drug use: No         4/27/2018     1:05 PM 4/24/2019    10:48 AM 5/27/2022     3:40 PM   DEBBIE-7 SCORE   Total Score 0 1 0         10/13/2015     1:10 PM 4/27/2018     1:05 PM 4/24/2019    10:48 AM   PHQ   PHQ-9 Total " Score 0 0 0   Q9: Thoughts of better off dead/self-harm past 2 weeks Not at all Not at all Not at all         4/24/2019    10:48 AM   Last PHQ-9   1.  Little interest or pleasure in doing things 0   2.  Feeling down, depressed, or hopeless 0   3.  Trouble falling or staying asleep, or sleeping too much 0   4.  Feeling tired or having little energy 0   5.  Poor appetite or overeating 0   6.  Feeling bad about yourself 0   7.  Trouble concentrating 0   8.  Moving slowly or restless 0   Q9: Thoughts of better off dead/self-harm past 2 weeks 0   PHQ-9 Total Score 0         5/27/2022     3:40 PM   DEBBIE-7    1. Feeling nervous, anxious, or on edge 0   2. Not being able to stop or control worrying 0   3. Worrying too much about different things 0   4. Trouble relaxing 0   5. Being so restless that it is hard to sit still 0   6. Becoming easily annoyed or irritable 0   7. Feeling afraid, as if something awful might happen 0   DEBBIE-7 Total Score 0   If you checked any problems, how difficult have they made it for you to do your work, take care of things at home, or get along with other people? Not difficult at all         How many servings of fruits and vegetables do you eat daily?  0-1    On average, how many sweetened beverages do you drink each day (Examples: soda, juice, sweet tea, etc.  Do NOT count diet or artificially sweetened beverages)?   0    How many days per week do you exercise enough to make your heart beat faster? 3 or less    How many minutes a day do you exercise enough to make your heart beat faster? 9 or less    How many days per week do you miss taking your medication? 0          Review of Systems   Constitutional, neuro, ENT, endocrine, pulmonary, cardiac, gastrointestinal, genitourinary, musculoskeletal, integument and psychiatric systems are negative, except as otherwise noted.       Objective           Vitals:  No vitals were obtained today due to virtual visit.    Physical Exam   healthy, alert and no  distress  PSYCH: Alert and oriented times 3; coherent speech, normal   rate and volume, able to articulate logical thoughts, able   to abstract reason, no tangential thoughts, no hallucinations   or delusions  Her affect is normal  RESP: No cough, no audible wheezing, able to talk in full sentences  Remainder of exam unable to be completed due to telephone visits                Phone call duration: 16 minutes

## 2023-05-30 ENCOUNTER — LAB (OUTPATIENT)
Dept: LAB | Facility: CLINIC | Age: 73
End: 2023-05-30
Payer: COMMERCIAL

## 2023-05-30 DIAGNOSIS — I25.119 CORONARY ARTERY DISEASE INVOLVING NATIVE HEART WITH ANGINA PECTORIS, UNSPECIFIED VESSEL OR LESION TYPE (H): ICD-10-CM

## 2023-05-30 DIAGNOSIS — E78.5 HYPERLIPIDEMIA LDL GOAL <100: ICD-10-CM

## 2023-05-30 LAB
ANION GAP SERPL CALCULATED.3IONS-SCNC: 10 MMOL/L (ref 7–15)
BUN SERPL-MCNC: 14.4 MG/DL (ref 8–23)
CALCIUM SERPL-MCNC: 9.4 MG/DL (ref 8.8–10.2)
CHLORIDE SERPL-SCNC: 105 MMOL/L (ref 98–107)
CHOLEST SERPL-MCNC: 159 MG/DL
CREAT SERPL-MCNC: 0.93 MG/DL (ref 0.51–0.95)
DEPRECATED HCO3 PLAS-SCNC: 27 MMOL/L (ref 22–29)
GFR SERPL CREATININE-BSD FRML MDRD: 65 ML/MIN/1.73M2
GLUCOSE SERPL-MCNC: 104 MG/DL (ref 70–99)
HDLC SERPL-MCNC: 41 MG/DL
LDLC SERPL CALC-MCNC: 94 MG/DL
NONHDLC SERPL-MCNC: 118 MG/DL
POTASSIUM SERPL-SCNC: 4.5 MMOL/L (ref 3.4–5.3)
SODIUM SERPL-SCNC: 142 MMOL/L (ref 136–145)
TRIGL SERPL-MCNC: 120 MG/DL

## 2023-05-30 PROCEDURE — 80048 BASIC METABOLIC PNL TOTAL CA: CPT

## 2023-05-30 PROCEDURE — 80061 LIPID PANEL: CPT

## 2023-05-30 PROCEDURE — 36415 COLL VENOUS BLD VENIPUNCTURE: CPT

## 2023-06-05 ENCOUNTER — TELEPHONE (OUTPATIENT)
Dept: FAMILY MEDICINE | Facility: CLINIC | Age: 73
End: 2023-06-05
Payer: COMMERCIAL

## 2023-06-05 NOTE — TELEPHONE ENCOUNTER
Per fax received from [facility] - LORazepam (ATIVAN) 1 MG tablet is not covered by patient's Insurance Company  Dr. Krause - Please choose:  1.  Change medication that is not covered to a different medication and send new prescription to patient's pharmacy?  2.  Patient will need to pay for the non-covered medication out-of-pocket?   3.  Try for Prior Authorization with Insurance Company to get medication covered?     Key# [id number]

## 2023-06-06 DIAGNOSIS — K44.9 HIATAL HERNIA: ICD-10-CM

## 2023-06-06 RX ORDER — SUCRALFATE 1 G/1
TABLET ORAL
Qty: 360 TABLET | Refills: 0 | Status: SHIPPED | OUTPATIENT
Start: 2023-06-06 | End: 2023-10-27

## 2023-06-08 NOTE — TELEPHONE ENCOUNTER
Faxed back questions to pharmacy:  1.  Is this a dose or quantity issue?  2.  What meds. are covered?

## 2023-06-09 ENCOUNTER — HOSPITAL ENCOUNTER (OUTPATIENT)
Dept: MAMMOGRAPHY | Facility: CLINIC | Age: 73
Discharge: HOME | End: 2023-06-09
Attending: FAMILY MEDICINE | Admitting: FAMILY MEDICINE
Payer: COMMERCIAL

## 2023-06-09 DIAGNOSIS — Z12.31 VISIT FOR SCREENING MAMMOGRAM: ICD-10-CM

## 2023-06-09 PROCEDURE — 77067 SCR MAMMO BI INCL CAD: CPT

## 2023-06-16 ENCOUNTER — HOSPITAL ENCOUNTER (OUTPATIENT)
Dept: MAMMOGRAPHY | Facility: CLINIC | Age: 73
Discharge: HOME | End: 2023-06-16
Attending: FAMILY MEDICINE
Payer: COMMERCIAL

## 2023-06-16 DIAGNOSIS — R92.8 ABNORMAL MAMMOGRAM: ICD-10-CM

## 2023-06-16 PROCEDURE — 76642 ULTRASOUND BREAST LIMITED: CPT | Mod: LT

## 2023-07-02 ENCOUNTER — HEALTH MAINTENANCE LETTER (OUTPATIENT)
Age: 73
End: 2023-07-02

## 2023-10-26 ENCOUNTER — TELEPHONE (OUTPATIENT)
Dept: ULTRASOUND IMAGING | Facility: CLINIC | Age: 73
End: 2023-10-26
Payer: COMMERCIAL

## 2023-10-26 DIAGNOSIS — K44.9 HIATAL HERNIA: ICD-10-CM

## 2023-10-27 RX ORDER — SUCRALFATE 1 G/1
TABLET ORAL
Qty: 240 TABLET | Refills: 0 | Status: SHIPPED | OUTPATIENT
Start: 2023-10-27 | End: 2024-04-03

## 2023-11-03 ENCOUNTER — TELEPHONE (OUTPATIENT)
Dept: FAMILY MEDICINE | Facility: CLINIC | Age: 73
End: 2023-11-03

## 2023-11-03 NOTE — TELEPHONE ENCOUNTER
Contacted patient   States she is taking 40 mg Omeprazole once daily  She feels the reflux is well controlled and does not need an EGD    To Dr. Krause as NADEEN Reza RN on 10/30/2023 at 9:35 AM    
Is she still needing this 4 times daily to control reflux?  She's been on this for quite a while. If still needing this then we should get EGD done to re-assess her hiatal hernia and esophagus.  
Provider to determine length of therapy    Rita Singh MSN, RN    
Pt was informed of medication weaning per provider recommendation, no further questions at this time.    Salma Bello RN    
Refill request was not for omeprazole it was for the sucralfate/Carafate.  If she is needing that 4 times daily in addition to the omeprazole, even if she has good reflux control, then we should obtain EGD.  
Try weaning to BID x 1 month.  If no flare of reflux symptoms then reduce further by 1 tab/month until off.  If symptoms flare go up to previous dose and let me know.  
sucralfate (CARAFATE) 1 GM tablet 240 tablet 0 10/27/2023  No   Sig: TAKE 1 TABLET BY MOUTH 4 TIMES DAILY. MAY DISSOLVE IN TWO TEASPOONFULS OF WATER   Sent to pharmacy as: Sucralfate 1 GM Oral Tablet (CARAFATE)   Sent to   [facility] - [place], [place] - [address]     Called patient and she reports taking the carafate 1 tablet 3 times daily and Omeprazole once daily. Patient reports she has never not taken the carafate and is not sure what would happen if she decreased the dosage. Patient reports no episodes of gastric reflux or concern with current medications.    Routing to Dr. Krause to evaluate/recommendations.    Aimee Mccormack RN on 11/1/2023 at 5:49 PM        
102

## 2023-12-08 ENCOUNTER — MYC MEDICAL ADVICE (OUTPATIENT)
Dept: FAMILY MEDICINE | Facility: CLINIC | Age: 73
End: 2023-12-08
Payer: COMMERCIAL

## 2023-12-08 DIAGNOSIS — Z12.11 SCREENING FOR COLON CANCER: Primary | ICD-10-CM

## 2024-03-14 ENCOUNTER — TELEPHONE (OUTPATIENT)
Dept: FAMILY MEDICINE | Facility: CLINIC | Age: 74
End: 2024-03-14
Payer: COMMERCIAL

## 2024-03-14 NOTE — TELEPHONE ENCOUNTER
Patient Contact    Attempt # 1    Was call answered?  No.  Left message on voicemail with information to call  back.    Marybeth Reza RN on 3/14/2024 at 11:42 AM

## 2024-03-14 NOTE — TELEPHONE ENCOUNTER
Chart is alerting that blood pressure monitoring is not up-to-date.  She was last seen in 2022.  Had a virtual visit in 2023 for meds but has not had blood pressure checked since 2022 in the office.  Does she check at home?  If so can she provide a home reading so we can document that?

## 2024-03-15 NOTE — TELEPHONE ENCOUNTER
PCP NADEEN.  Patient states she does not check BP at home.   She has been in [place] all winter and will be back in about a month, plans to schedule annual with PCP at that time.    Jessica Arteaga RN  [facility]

## 2024-03-28 ENCOUNTER — MYC MEDICAL ADVICE (OUTPATIENT)
Dept: FAMILY MEDICINE | Facility: CLINIC | Age: 74
End: 2024-03-28
Payer: COMMERCIAL

## 2024-03-28 NOTE — TELEPHONE ENCOUNTER
Patient Quality Outreach    Patient is due for the following:   Depression  -  PHQ-9 needed    Next Steps:   Patient was assigned appropriate questionnaire to complete    Type of outreach:    Sent [employer] message.      Questions for provider review:    None           Nahomy Dolan

## 2024-04-30 DIAGNOSIS — K44.9 HIATAL HERNIA: ICD-10-CM

## 2024-04-30 RX ORDER — SUCRALFATE 1 G/1
TABLET ORAL
Qty: 120 TABLET | Refills: 0 | Status: SHIPPED | OUTPATIENT
Start: 2024-04-30 | End: 2024-06-05

## 2024-06-05 ENCOUNTER — OFFICE VISIT (OUTPATIENT)
Dept: FAMILY MEDICINE | Facility: CLINIC | Age: 74
End: 2024-06-05
Payer: COMMERCIAL

## 2024-06-05 VITALS
HEIGHT: 65 IN | WEIGHT: 197 LBS | BODY MASS INDEX: 32.82 KG/M2 | RESPIRATION RATE: 18 BRPM | HEART RATE: 55 BPM | OXYGEN SATURATION: 95 % | DIASTOLIC BLOOD PRESSURE: 72 MMHG | TEMPERATURE: 98.8 F | SYSTOLIC BLOOD PRESSURE: 108 MMHG

## 2024-06-05 DIAGNOSIS — K21.9 GASTROESOPHAGEAL REFLUX DISEASE WITHOUT ESOPHAGITIS: ICD-10-CM

## 2024-06-05 DIAGNOSIS — F41.9 ANXIETY: ICD-10-CM

## 2024-06-05 DIAGNOSIS — J31.0 CHRONIC RHINITIS: ICD-10-CM

## 2024-06-05 DIAGNOSIS — E78.5 HYPERLIPIDEMIA LDL GOAL <100: ICD-10-CM

## 2024-06-05 DIAGNOSIS — K44.9 HIATAL HERNIA: ICD-10-CM

## 2024-06-05 DIAGNOSIS — Z12.11 COLON CANCER SCREENING: ICD-10-CM

## 2024-06-05 DIAGNOSIS — J45.21 MILD INTERMITTENT ASTHMA WITH EXACERBATION: ICD-10-CM

## 2024-06-05 DIAGNOSIS — I25.119 CORONARY ARTERY DISEASE INVOLVING NATIVE HEART WITH ANGINA PECTORIS, UNSPECIFIED VESSEL OR LESION TYPE (H): Primary | ICD-10-CM

## 2024-06-05 LAB
ANION GAP SERPL CALCULATED.3IONS-SCNC: 13 MMOL/L (ref 7–15)
BUN SERPL-MCNC: 16.2 MG/DL (ref 8–23)
CALCIUM SERPL-MCNC: 9.1 MG/DL (ref 8.8–10.2)
CHLORIDE SERPL-SCNC: 102 MMOL/L (ref 98–107)
CHOLEST SERPL-MCNC: 149 MG/DL
CREAT SERPL-MCNC: 1.12 MG/DL (ref 0.51–0.95)
DEPRECATED HCO3 PLAS-SCNC: 25 MMOL/L (ref 22–29)
EGFRCR SERPLBLD CKD-EPI 2021: 51 ML/MIN/1.73M2
FASTING STATUS PATIENT QL REPORTED: YES
FASTING STATUS PATIENT QL REPORTED: YES
GLUCOSE SERPL-MCNC: 106 MG/DL (ref 70–99)
HDLC SERPL-MCNC: 41 MG/DL
LDLC SERPL CALC-MCNC: 89 MG/DL
NONHDLC SERPL-MCNC: 108 MG/DL
POTASSIUM SERPL-SCNC: 4.5 MMOL/L (ref 3.4–5.3)
SODIUM SERPL-SCNC: 140 MMOL/L (ref 135–145)
TRIGL SERPL-MCNC: 94 MG/DL

## 2024-06-05 PROCEDURE — 80048 BASIC METABOLIC PNL TOTAL CA: CPT | Performed by: FAMILY MEDICINE

## 2024-06-05 PROCEDURE — 80061 LIPID PANEL: CPT | Performed by: FAMILY MEDICINE

## 2024-06-05 PROCEDURE — 99214 OFFICE O/P EST MOD 30 MIN: CPT | Performed by: FAMILY MEDICINE

## 2024-06-05 PROCEDURE — 36415 COLL VENOUS BLD VENIPUNCTURE: CPT | Performed by: FAMILY MEDICINE

## 2024-06-05 RX ORDER — ALBUTEROL SULFATE 90 UG/1
2 AEROSOL, METERED RESPIRATORY (INHALATION) EVERY 4 HOURS PRN
Qty: 18 G | Refills: 3 | Status: SHIPPED | OUTPATIENT
Start: 2024-06-05

## 2024-06-05 RX ORDER — LORAZEPAM 1 MG/1
.5-1 TABLET ORAL DAILY PRN
Qty: 10 TABLET | Refills: 0 | Status: SHIPPED | OUTPATIENT
Start: 2024-06-05

## 2024-06-05 RX ORDER — ATORVASTATIN CALCIUM 80 MG/1
80 TABLET, FILM COATED ORAL DAILY
Qty: 90 TABLET | Refills: 4 | Status: SHIPPED | OUTPATIENT
Start: 2024-06-05

## 2024-06-05 RX ORDER — MONTELUKAST SODIUM 10 MG/1
1 TABLET ORAL AT BEDTIME
Qty: 90 TABLET | Refills: 4 | Status: SHIPPED | OUTPATIENT
Start: 2024-06-05

## 2024-06-05 RX ORDER — FLUTICASONE PROPIONATE 50 MCG
2 SPRAY, SUSPENSION (ML) NASAL DAILY
Qty: 48 ML | Refills: 4 | Status: SHIPPED | OUTPATIENT
Start: 2024-06-05

## 2024-06-05 RX ORDER — ALBUTEROL SULFATE 90 UG/1
2 AEROSOL, METERED RESPIRATORY (INHALATION) EVERY 4 HOURS PRN
Qty: 18 G | Refills: 3 | Status: CANCELLED | OUTPATIENT
Start: 2024-06-05

## 2024-06-05 RX ORDER — SUCRALFATE 1 G/1
1 TABLET ORAL 2 TIMES DAILY
Qty: 180 TABLET | Refills: 4 | Status: SHIPPED | OUTPATIENT
Start: 2024-06-05

## 2024-06-05 RX ORDER — SERTRALINE HYDROCHLORIDE 25 MG/1
25 TABLET, FILM COATED ORAL DAILY
Qty: 90 TABLET | Refills: 4 | Status: SHIPPED | OUTPATIENT
Start: 2024-06-05

## 2024-06-05 RX ORDER — METOPROLOL SUCCINATE 25 MG/1
25 TABLET, EXTENDED RELEASE ORAL DAILY
Qty: 90 TABLET | Refills: 4 | Status: SHIPPED | OUTPATIENT
Start: 2024-06-05

## 2024-06-05 RX ORDER — ALBUTEROL SULFATE 0.83 MG/ML
2.5 SOLUTION RESPIRATORY (INHALATION) EVERY 6 HOURS PRN
Qty: 9 ML | Refills: 11 | Status: SHIPPED | OUTPATIENT
Start: 2024-06-05

## 2024-06-05 RX ORDER — FLUTICASONE PROPIONATE AND SALMETEROL 100; 50 UG/1; UG/1
1 POWDER RESPIRATORY (INHALATION) 2 TIMES DAILY
Qty: 60 EACH | Refills: 4 | Status: CANCELLED | OUTPATIENT
Start: 2024-06-05

## 2024-06-05 RX ORDER — OMEPRAZOLE 40 MG/1
40 CAPSULE, DELAYED RELEASE ORAL EVERY 12 HOURS
Qty: 180 CAPSULE | Refills: 4 | Status: SHIPPED | OUTPATIENT
Start: 2024-06-05

## 2024-06-05 RX ORDER — RESPIRATORY SYNCYTIAL VIRUS VACCINE 120MCG/0.5
0.5 KIT INTRAMUSCULAR ONCE
Qty: 1 EACH | Refills: 0 | Status: CANCELLED | OUTPATIENT
Start: 2024-06-05 | End: 2024-06-05

## 2024-06-05 RX ORDER — LISINOPRIL 10 MG/1
10 TABLET ORAL DAILY
Qty: 90 TABLET | Refills: 4 | Status: SHIPPED | OUTPATIENT
Start: 2024-06-05

## 2024-06-05 ASSESSMENT — ASTHMA QUESTIONNAIRES
ACT_TOTALSCORE: 25
ACT_TOTALSCORE: 25
QUESTION_3 LAST FOUR WEEKS HOW OFTEN DID YOUR ASTHMA SYMPTOMS (WHEEZING, COUGHING, SHORTNESS OF BREATH, CHEST TIGHTNESS OR PAIN) WAKE YOU UP AT NIGHT OR EARLIER THAN USUAL IN THE MORNING: NOT AT ALL
QUESTION_5 LAST FOUR WEEKS HOW WOULD YOU RATE YOUR ASTHMA CONTROL: COMPLETELY CONTROLLED
EMERGENCY_ROOM_LAST_YEAR_TOTAL: ONE
QUESTION_2 LAST FOUR WEEKS HOW OFTEN HAVE YOU HAD SHORTNESS OF BREATH: NOT AT ALL
QUESTION_1 LAST FOUR WEEKS HOW MUCH OF THE TIME DID YOUR ASTHMA KEEP YOU FROM GETTING AS MUCH DONE AT WORK, SCHOOL OR AT HOME: NONE OF THE TIME
QUESTION_4 LAST FOUR WEEKS HOW OFTEN HAVE YOU USED YOUR RESCUE INHALER OR NEBULIZER MEDICATION (SUCH AS ALBUTEROL): NOT AT ALL

## 2024-06-05 ASSESSMENT — PAIN SCALES - GENERAL: PAINLEVEL: NO PAIN (0)

## 2024-06-05 NOTE — LETTER
June 18, 2024      Anna Dahl  [address] [place] [address]        Dear ,    We are writing to inform you of your test results.    Results are normal or within acceptable limits.     For additional lab test information, [email or web address].org is an excellent reference.    Resulted Orders   Lipid panel reflex to direct LDL Fasting   Result Value Ref Range    Cholesterol 149 <200 mg/dL    Triglycerides 94 <150 mg/dL    Direct Measure HDL 41 (L) >=50 mg/dL    LDL Cholesterol Calculated 89 <=100 mg/dL    Non HDL Cholesterol 108 <130 mg/dL    Patient Fasting > 8hrs? Yes     Narrative    Cholesterol  Desirable:  <200 mg/dL    Triglycerides  Normal:  Less than 150 mg/dL  Borderline High:  150-199 mg/dL  High:  200-499 mg/dL  Very High:  Greater than or equal to 500 mg/dL    Direct Measure HDL  Female:  Greater than or equal to 50 mg/dL   Male:  Greater than or equal to 40 mg/dL    LDL Cholesterol  Desirable:  <100mg/dL  Above Desirable:  100-129 mg/dL   Borderline High:  130-159 mg/dL   High:  160-189 mg/dL   Very High:  >= 190 mg/dL    Non HDL Cholesterol  Desirable:  130 mg/dL  Above Desirable:  130-159 mg/dL  Borderline High:  160-189 mg/dL  High:  190-219 mg/dL  Very High:  Greater than or equal to 220 mg/dL   Basic metabolic panel   Result Value Ref Range    Sodium 140 135 - 145 mmol/L      Comment:      Reference intervals for this test were updated on 09/26/2023 to more accurately reflect our healthy population. There may be differences in the flagging of prior results with similar values performed with this method. Interpretation of those prior results can be made in the context of the updated reference intervals.     Potassium 4.5 3.4 - 5.3 mmol/L    Chloride 102 98 - 107 mmol/L    Carbon Dioxide (CO2) 25 22 - 29 mmol/L    Anion Gap 13 7 - 15 mmol/L    Urea Nitrogen 16.2 8.0 - 23.0 mg/dL    Creatinine 1.12 (H) 0.51 - 0.95 mg/dL    GFR Estimate 51 (L) >60 mL/min/1.73m2    Calcium 9.1 8.8 -  10.2 mg/dL    Glucose 106 (H) 70 - 99 mg/dL    Patient Fasting > 8hrs? Yes        If you have any questions or concerns, please call the clinic at the number listed above.       Sincerely,      Vivek Krause MD

## 2024-06-05 NOTE — PROGRESS NOTES
Assessment & Plan     Coronary artery disease involving native heart with angina pectoris, unspecified vessel or lesion type (H24)  Stable. Refilled medication.  Check labs.   - lisinopril (ZESTRIL) 10 MG tablet; Take 1 tablet (10 mg) by mouth daily  - metoprolol succinate ER (TOPROL XL) 25 MG 24 hr tablet; Take 1 tablet (25 mg) by mouth daily  - Basic metabolic panel; Future  - Basic metabolic panel    Mild intermittent asthma with exacerbation  Well controlled. Refilled medication.     - albuterol (PROAIR HFA/PROVENTIL HFA/VENTOLIN HFA) 108 (90 Base) MCG/ACT inhaler; Inhale 2 puffs into the lungs every 4 hours as needed for shortness of breath  - albuterol (PROVENTIL) (2.5 MG/3ML) 0.083% neb solution; Take 1 vial (2.5 mg) by nebulization every 6 hours as needed for shortness of breath or wheezing    Chronic rhinitis  Stable. Refilled medication.    - fluticasone (FLONASE) 50 MCG/ACT nasal spray; Spray 2 sprays into both nostrils daily  - montelukast (SINGULAIR) 10 MG tablet; Take 1 tablet (10 mg) by mouth at bedtime    Anxiety  Well controlled. Refilled medication.     - LORazepam (ATIVAN) 1 MG tablet; Take 0.5-1 tablets (0.5-1 mg) by mouth daily as needed for anxiety  - sertraline (ZOLOFT) 25 MG tablet; Take 1 tablet (25 mg) by mouth daily    Hiatal hernia  Well controlled. Refilled medication.   Wean Carafate as tolerated.  - sucralfate (CARAFATE) 1 GM tablet; Take 1 tablet (1 g) by mouth 2 times daily    Gastroesophageal reflux disease without esophagitis  Well controlled. Refilled medication.     - omeprazole (PRILOSEC) 40 MG DR capsule; Take 1 capsule (40 mg) by mouth every 12 hours    Hyperlipidemia LDL goal <100  Well controlled. Refilled medication. Check labs.    - atorvastatin (LIPITOR) 80 MG tablet; Take 1 tablet (80 mg) by mouth daily  - Lipid panel reflex to direct LDL Fasting; Future  - Lipid panel reflex to direct LDL Fasting    Colon cancer screening  Declined cologuard. Agreed to FIT. Discussed  "differences in sensitivity/specificity.  - Fecal colorectal cancer screen (FIT); Future  - Fecal colorectal cancer screen (FIT)          BMI  Estimated body mass index is 32.78 kg/m  as calculated from the following:    Height as of this encounter: 1.651 m (5' 5\").    Weight as of this encounter: 89.4 kg (197 lb).             Anne Marie Castillo is a 73 year old, presenting for the following health issues:  No chief complaint on file.    History of Present Illness     Asthma:  She presents for follow up of asthma.  She has no cough, no wheezing, and no shortness of breath. She is not using a relief medication.  She does not miss any doses of her controller medication throughout the week. Patient is aware of the following triggers: unaware of any triggers. The patient has had a visit to the Emergency Room, Urgent Care or Hospital due to asthma since the last clinic visit. She has been to the Emergency Room or Urgent Care 1 time.She has had a Hospitalization 0 times.    Hyperlipidemia:  She presents for follow up of hyperlipidemia.   She is taking medication to lower cholesterol. She is not having myalgia or other side effects to statin medications.    She eats 0-1 servings of fruits and vegetables daily.She consumes 0 sweetened beverage(s) daily.She exercises with enough effort to increase her heart rate 9 or less minutes per day.  She exercises with enough effort to increase her heart rate 3 or less days per week.   She is taking medications regularly.         Hypertension Follow-up    Do you check your blood pressure regularly outside of the clinic? Yes   Are you following a low salt diet? No  Are your blood pressures ever more than 140 on the top number (systolic) OR more   than 90 on the bottom number (diastolic), for example 140/90? No      ROS:   Constitutional, neuro, ENT, endocrine, pulmonary, cardiac, gastrointestinal, genitourinary, musculoskeletal, integument and psychiatric systems are negative, except " "as otherwise noted.       Objective    /72   Pulse 55   Temp 98.8  F (37.1  C) (Tympanic)   Resp 18   Ht 1.651 m (5' 5\")   Wt 89.4 kg (197 lb)   SpO2 95%   BMI 32.78 kg/m    Body mass index is 32.78 kg/m .  Physical Exam   GENERAL: Pleasant, well appearing female.  HEENT: PERRL, EOMI.  NECK: supple, no thyromegaly or thyroid masses, no lymphadenopathy.  CV: RRR, no murmurs, rubs or gallops.  LUNGS: Clear to auscultation bilaterally, normal effort.  ABDOMEN: Soft, non-distended, non-tender.  No hepatosplenomegaly or palpable masses.    SKIN: warm and dry without obvious rashes.   EXTREMITIES: No edema, normal pulses.             Signed Electronically by: Vivek Krause MD    "

## 2024-06-05 NOTE — NURSING NOTE
"Initial /72   Pulse 55   Temp 98.8  F (37.1  C) (Tympanic)   Resp 18   Ht 1.651 m (5' 5\")   Wt 89.4 kg (197 lb)   SpO2 95%   BMI 32.78 kg/m   Estimated body mass index is 32.78 kg/m  as calculated from the following:    Height as of this encounter: 1.651 m (5' 5\").    Weight as of this encounter: 89.4 kg (197 lb). .    "

## 2024-06-17 PROBLEM — Z76.89 HEALTH CARE HOME: Status: RESOLVED | Noted: 2017-06-21 | Resolved: 2024-06-17

## 2024-07-05 PROCEDURE — 82274 ASSAY TEST FOR BLOOD FECAL: CPT | Performed by: FAMILY MEDICINE

## 2024-07-08 LAB — HEMOCCULT STL QL IA: POSITIVE

## 2024-08-12 ENCOUNTER — HOSPITAL ENCOUNTER (EMERGENCY)
Facility: CLINIC | Age: 74
Discharge: HOME | End: 2024-08-12
Attending: NURSE PRACTITIONER | Admitting: NURSE PRACTITIONER
Payer: COMMERCIAL

## 2024-08-12 VITALS
HEART RATE: 55 BPM | RESPIRATION RATE: 16 BRPM | DIASTOLIC BLOOD PRESSURE: 74 MMHG | OXYGEN SATURATION: 96 % | TEMPERATURE: 97.7 F | SYSTOLIC BLOOD PRESSURE: 159 MMHG

## 2024-08-12 DIAGNOSIS — L03.312 CELLULITIS OF UPPER BACK EXCLUDING SCAPULAR REGION: ICD-10-CM

## 2024-08-12 PROCEDURE — 99213 OFFICE O/P EST LOW 20 MIN: CPT | Performed by: NURSE PRACTITIONER

## 2024-08-12 PROCEDURE — G0463 HOSPITAL OUTPT CLINIC VISIT: HCPCS | Performed by: NURSE PRACTITIONER

## 2024-08-12 RX ORDER — DOXYCYCLINE 100 MG/1
100 CAPSULE ORAL 2 TIMES DAILY
Qty: 14 CAPSULE | Refills: 0 | Status: SHIPPED | OUTPATIENT
Start: 2024-08-12 | End: 2024-08-19

## 2024-08-12 ASSESSMENT — COLUMBIA-SUICIDE SEVERITY RATING SCALE - C-SSRS
1. IN THE PAST MONTH, HAVE YOU WISHED YOU WERE DEAD OR WISHED YOU COULD GO TO SLEEP AND NOT WAKE UP?: NO
2. HAVE YOU ACTUALLY HAD ANY THOUGHTS OF KILLING YOURSELF IN THE PAST MONTH?: NO
6. HAVE YOU EVER DONE ANYTHING, STARTED TO DO ANYTHING, OR PREPARED TO DO ANYTHING TO END YOUR LIFE?: NO

## 2024-08-12 ASSESSMENT — ACTIVITIES OF DAILY LIVING (ADL): ADLS_ACUITY_SCORE: 38

## 2024-08-12 NOTE — ED PROVIDER NOTES
ED Provider Note  [facility]      History     Chief Complaint   Patient presents with    Lesion     Middle of back      HPI  Anna Dahl is a 73 year old female who presents with 2 to 3 weeks of pruritic, painful wound on her upper back, she is unsure that this wound originated from.  Reports that this began as an itching area but has began weeping yellow clear drainage.  Denies fever or chills.  Patient has a history of skin cancers concerned that this may be a flareup of her skin cancer.   She was unable to get into dermatology timely thus is presenting in urgent care today to be evaluated.            Allergies:  Allergies   Allergen Reactions    Amoxicillin Anaphylaxis    Rosuvastatin Other (See Comments)     Severe headache, backache    Beclomethasone Other (See Comments)     Headache from the Qvar Inhaler    Reglan [Metoclopramide Hcl] Visual Disturbance     Disoriented, hallucinations       Problem List:    Patient Active Problem List    Diagnosis Date Noted    Coronary artery disease involving native heart with angina pectoris, unspecified vessel or lesion type (H24) 05/14/2012     Priority: High     NSTEMI May 2012        EF 35% (improved to 60-65% on ECHO done 2015)  PCI to mid-LAD vessel and first diagonal.  Plan to return at end of May 2012 for staged stenting of RCA  Rx Effient for one year, start Crestor      Chronic rhinitis 05/06/2019     Priority: Medium    Acute kidney failure (H24) 06/03/2017     Priority: Medium    Postoperative anemia 06/03/2017     Priority: Medium    Elevated glucose 06/02/2017     Priority: Medium     06/2017 - fasting glucose 144, a1c 5.6%      S/P total knee arthroplasty 06/01/2017     Priority: Medium    Osteoporosis 10/13/2015     Priority: Medium    Anxiety 10/03/2013     Priority: Medium    BCC (basal cell carcinoma), face 03/28/2013     Priority: Medium    Non-ST elevation myocardial infarction (NSTEMI), initial care episode (H)  05/13/2012     Priority: Medium    Chronic sinusitis 08/23/2011     Priority: Medium    Mild intermittent asthma with exacerbation 08/22/2011     Priority: Medium    GERD (gastroesophageal reflux disease) 04/20/2011     Priority: Medium    Hyperlipidemia LDL goal <100 04/20/2011     Priority: Medium     Did not tolerate Crestor  Did not tolerate 80 mg of atorvastatin, but has been able to take 40 mg      Colon polyps 10/19/2010     Priority: Medium     20 mm polyp removed Oct 2010  Recommend repeat colonoscopy 1 year      Hiatal hernia 09/08/2010     Priority: Medium    Nasal polyp      Priority: Medium     Nasal polyps  Problem list name updated by automated process. Provider to review          Past Medical History:    Past Medical History:   Diagnosis Date    Asthma     Basal cell carcinoma     Calculus of kidney     Gastro-oesophageal reflux disease     Renal colic     Unspecified asthma(493.90)     Unspecified nasal polyp     Unspecified otitis media     Unspecified sinusitis (chronic)        Past Surgical History:    Past Surgical History:   Procedure Laterality Date    ARTHROPLASTY KNEE Right 06/01/2017    Procedure: ARTHROPLASTY KNEE;  Right total knee arthroplasty;  Surgeon: Colin Krause MD;  Location: [facility]    ENT SURGERY      ESOPHAGOSCOPY, GASTROSCOPY, DUODENOSCOPY (EGD), COMBINED  02/06/2013    Procedure: COMBINED ESOPHAGOSCOPY, GASTROSCOPY, DUODENOSCOPY (EGD), BIOPSY SINGLE OR MULTIPLE;  Gastroscopy;  Surgeon: Yves Mills MD;  Location: [facility]    ETHMOIDECTOMY  01/05/2011    ETHMOIDECTOMY performed by ADDY SERRANO at [facility]    MOHS MICROGRAPHIC PROCEDURE      MOHS MICROGRAPHIC PROCEDURE      SURGICAL HISTORY OF -   06/01/2003    Bilateral total ethmoidecomy with bilateral maxillary antrostomies with removal of polyps, bilateral frontal sinusotomies, and left sphenoidotomy    SURGICAL HISTORY OF -   01/01/1985    Bilateral intranasal polypectomy with bilateraly nasal antral  punctures     SURGICAL HISTORY OF -   01/01/2004    Mohs micrographic surgery, fresh tissue technique with complex wound repair. below right ear at angle of jawline.    TUBAL LIGATION      tubal ligation       Family History:    Family History   Problem Relation Age of Onset    Heart Disease Father         heart surgery    Lipids Father     Alzheimer Disease Mother     Lipids Brother     Lipids Brother        Social History:  Marital Status:   [2]  Social History     Tobacco Use    Smoking status: Never    Smokeless tobacco: Never   Substance Use Topics    Alcohol use: Yes     Comment: moderate couple drinks on weekends    Drug use: No        Medications:    doxycycline hyclate (VIBRAMYCIN) 100 MG capsule  acetaminophen (TYLENOL) 325 MG tablet  albuterol (PROAIR HFA/PROVENTIL HFA/VENTOLIN HFA) 108 (90 Base) MCG/ACT inhaler  albuterol (PROVENTIL) (2.5 MG/3ML) 0.083% neb solution  alendronate (FOSAMAX) 70 MG tablet  aspirin (ASA) 81 MG EC tablet  atorvastatin (LIPITOR) 80 MG tablet  fluticasone (FLONASE) 50 MCG/ACT nasal spray  fluticasone-salmeterol (ADVAIR DISKUS) 100-50 MCG/ACT inhaler  lisinopril (ZESTRIL) 10 MG tablet  LORazepam (ATIVAN) 1 MG tablet  metoprolol succinate ER (TOPROL XL) 25 MG 24 hr tablet  montelukast (SINGULAIR) 10 MG tablet  nitroGLYcerin (NITROSTAT) 0.4 MG sublingual tablet  omeprazole (PRILOSEC) 40 MG DR capsule  sertraline (ZOLOFT) 25 MG tablet  sucralfate (CARAFATE) 1 GM tablet          Review of Systems  A medically appropriate review of systems was performed with pertinent positives and negatives noted in the HPI, and all other systems negative.    Physical Exam   Patient Vitals for the past 24 hrs:   BP Temp Temp src Pulse Resp SpO2   08/12/24 1323 (!) 159/74 97.7  F (36.5  C) Oral 55 16 96 %          Physical Exam  General: alert and in no acute distress on arrival  Head: atraumatic, normocephalic  Lungs:  nonlabored, CTA bilaterally throughout  CV:  regular rate and rhythm  extremities warm and perfused  Skin: no rashes, no diaphoresis, is a cellulitic area on her upper left back above the scapula this area has excoriations and a thick yellow drainage present.  Neuro: Patient awake, alert, speech is fluent, no focal deficits  Psychiatric: affect/mood normal, appropriate historian      ED Course                 Procedures                    No results found for this or any previous visit (from the past 24 hour(s)).    MEDICATIONS GIVEN IN THE EMERGENCY DEPARTMENT:  Medications - No data to display             Assessments & Plan (with Medical Decision Making)  73 year old female who presents to the Urgent Care for evaluation of cellulitis of left upper back.  Patient has allergies to amoxicillin, unsure but thinks that she may have had a reaction to Keflex in the past, ordered doxycycline grams twice daily for 7 days.  Recommend return or follow-up if symptoms are not improving or if they worsen despite recommended treatment plan.       I have reviewed the nursing notes.    I have reviewed the findings, diagnosis, plan and need for follow up with the patient.        NEW PRESCRIPTIONS STARTED AT TODAY'S ER VISIT  New Prescriptions    DOXYCYCLINE HYCLATE (VIBRAMYCIN) 100 MG CAPSULE    Take 1 capsule (100 mg) by mouth 2 times daily for 7 days       Final diagnoses:   Cellulitis of upper back excluding scapular region       8/12/2024   [facility] EMERGENCY DEPT       Tiara Yeh APRN CNP  08/16/24 0908

## 2024-08-12 NOTE — DISCHARGE INSTRUCTIONS
Doxycycline is ordered for you twice daily for the next 7 days for skin infection called cellulitis.  Recommend follow-up next week in your primary clinic for wound recheck.  If your symptoms are not improving despite recommended treatment plan you develop fever or chills you should return for further evaluation.

## 2024-08-20 ENCOUNTER — PATIENT OUTREACH (OUTPATIENT)
Dept: FAMILY MEDICINE | Facility: CLINIC | Age: 74
End: 2024-08-20

## 2024-08-20 ENCOUNTER — OFFICE VISIT (OUTPATIENT)
Dept: FAMILY MEDICINE | Facility: CLINIC | Age: 74
End: 2024-08-20
Payer: COMMERCIAL

## 2024-08-20 VITALS
BODY MASS INDEX: 32.65 KG/M2 | DIASTOLIC BLOOD PRESSURE: 60 MMHG | HEART RATE: 52 BPM | RESPIRATION RATE: 16 BRPM | TEMPERATURE: 97.7 F | SYSTOLIC BLOOD PRESSURE: 139 MMHG | OXYGEN SATURATION: 97 % | WEIGHT: 196 LBS | HEIGHT: 65 IN

## 2024-08-20 DIAGNOSIS — T14.8XXA WOUND OF SKIN: Primary | ICD-10-CM

## 2024-08-20 PROCEDURE — 99213 OFFICE O/P EST LOW 20 MIN: CPT | Performed by: NURSE PRACTITIONER

## 2024-08-20 ASSESSMENT — PAIN SCALES - GENERAL: PAINLEVEL: NO PAIN (0)

## 2024-08-20 NOTE — TELEPHONE ENCOUNTER
Patient Quality Outreach    Patient is due for the following:   Physical Annual Wellness Visit    Next Steps:   Schedule a Annual Wellness Visit    Type of outreach:    Sent [employer] message.      Questions for provider review:    None           Majo Conde CMA

## 2024-08-20 NOTE — PROGRESS NOTES
"  Assessment & Plan     Wound of skin    No signs of infection, quite an impressive scab without any known injury and would recommend keeping close eye on wound as it continues to heal. If scab remains or returns would recommend a biopsy.  She was also encouraged to make Medicare exam with PCP and could have it looked at again at that time.  Patient in agreement.  States she couldn't get in to dermatology until next March.      MED REC REQUIRED  Post Medication Reconciliation Status: discharge medications reconciled, continue medications without change  BMI  Estimated body mass index is 32.62 kg/m  as calculated from the following:    Height as of this encounter: 1.651 m (5' 5\").    Weight as of this encounter: 88.9 kg (196 lb).         See Patient Instructions  Patient Instructions   No signs of infection.  Continue to monitor and allow scab to fall off.  Follow up if any further concerns.  Schedule Medicare exam with Dr. Krause.      When you are out of refills or the refills say \"zero\", it is time to schedule your next appointment in clinic!    Labs are released to you almost immediately and sometimes before I have had a chance to review them.  I review labs regularly and once they are all in, you will either be sent a letter with your results or if you are signed up for on-line services, you will be notified that results are available to you on [employer]. If there are serious findings, you typically will be called.    If you have any questions about your visit, your symptoms, your medication, your test results or it is not clear what your diagnosis or treatment plan is please contact me (via [employer]) or call the care team at [phone] and say \"Care Team\"            Anne Marie Castillo is a 73 year old, presenting for the following health issues:  ER F/U        8/20/2024     2:24 PM   Additional Questions   Roomed by      HPI       ED/UC Followup:    Facility:  [facility]   Date of visit: 8/12/24  Reason for " "visit: Cellulitis of upper back excluding scapular region  Current Status: still itches     She was treated for cellulitis with doxycyline for a wound on center of back.   She has history of skin cancer so is concerned.  Wound has healed and now just itches mildly.  She denies any known injury or trauma to explain the wound.      Review of Systems  Constitutional, HEENT, cardiovascular, pulmonary, gi and gu systems are negative, except as otherwise noted.      Objective    /60   Pulse 52   Temp 97.7  F (36.5  C) (Tympanic)   Resp 16   Ht 1.651 m (5' 5\")   Wt 88.9 kg (196 lb)   SpO2 97%   BMI 32.62 kg/m    Body mass index is 32.62 kg/m .  Physical Exam   GENERAL: healthy, alert and no distress  NECK: no adenopathy, no asymmetry  RESP: lungs clear   CV: regular rate and rhythm  MS: no gross musculoskeletal defects noted  SKIN: mid back she has a round raised scab, no surrounding erythema or discharge              Signed Electronically by: TROY Shirley CNP    "

## 2024-08-20 NOTE — LETTER
August 20, 2024    To  Anna Dahl  [address] [place] [address]    Your team at [facility] cares about your health. We have reviewed your chart and based on our findings; we are making the following recommendations to better manage your health.     You are in particular need of attention regarding the following:     PREVENTATIVE VISIT: Annual Medicare Wellness:Schedule an Annual Medicare Wellness Exam. Please call your [facility] clinic to set up your appointment.    If you have already completed these items, please contact the clinic via phone or   MyChart so your care team can review and update your records. Thank you for   choosing [facility] for your healthcare needs. For any questions,   concerns, or to schedule an appointment please contact our clinic.    Healthy Regards,      Your [facility] Care Team

## 2024-08-20 NOTE — PATIENT INSTRUCTIONS
"No signs of infection.  Continue to monitor and allow scab to fall off.  Follow up if any further concerns.  Schedule Medicare exam with Dr. Krause.      When you are out of refills or the refills say \"zero\", it is time to schedule your next appointment in clinic!    Labs are released to you almost immediately and sometimes before I have had a chance to review them.  I review labs regularly and once they are all in, you will either be sent a letter with your results or if you are signed up for on-line services, you will be notified that results are available to you on [employer]. If there are serious findings, you typically will be called.    If you have any questions about your visit, your symptoms, your medication, your test results or it is not clear what your diagnosis or treatment plan is please contact me (via [employer]) or call the care team at [phone] and say \"Care Team\"          "

## 2024-08-25 ENCOUNTER — HEALTH MAINTENANCE LETTER (OUTPATIENT)
Age: 74
End: 2024-08-25

## 2024-12-21 DIAGNOSIS — F41.9 ANXIETY: ICD-10-CM

## 2024-12-23 RX ORDER — LORAZEPAM 1 MG/1
TABLET ORAL
Qty: 10 TABLET | Refills: 0 | Status: SHIPPED | OUTPATIENT
Start: 2024-12-23

## 2025-01-09 ENCOUNTER — MYC MEDICAL ADVICE (OUTPATIENT)
Dept: FAMILY MEDICINE | Facility: CLINIC | Age: 75
End: 2025-01-09
Payer: COMMERCIAL

## 2025-01-09 NOTE — TELEPHONE ENCOUNTER
Patient Quality Outreach    Patient is due for the following:   Physical Annual Wellness Visit    Action(s) Taken:   Schedule a Annual Wellness Visit    Type of outreach:    Sent [employer] message.    Questions for provider review:    None           Nahomy Dolan

## 2025-05-18 DIAGNOSIS — F41.9 ANXIETY: ICD-10-CM

## 2025-05-19 RX ORDER — LORAZEPAM 1 MG/1
TABLET ORAL
Qty: 10 TABLET | Refills: 0 | Status: SHIPPED | OUTPATIENT
Start: 2025-05-19

## 2025-05-28 DIAGNOSIS — J31.0 CHRONIC RHINITIS: ICD-10-CM

## 2025-05-29 RX ORDER — FLUTICASONE PROPIONATE 50 MCG
2 SPRAY, SUSPENSION (ML) NASAL DAILY
Qty: 48 G | Refills: 0 | Status: SHIPPED | OUTPATIENT
Start: 2025-05-29

## 2025-05-29 NOTE — TELEPHONE ENCOUNTER
Prescription approved per [facility] Refill Protocol.  Pending Prescriptions:                       Disp   Refills    fluticasone (FLONASE) 50 MCG/ACT nasal sp*48 g   0            Sig: Use 2 spray(s) in each nostril once daily      Requested Prescriptions   Pending Prescriptions Disp Refills    fluticasone (FLONASE) 50 MCG/ACT nasal spray [Pharmacy Med Name: Fluticasone Propionate 50 MCG/ACT Nasal Suspension] 48 g 0     Sig: Use 2 spray(s) in each nostril once daily       Nasal Allergy Protocol Passed - 5/29/2025 10:59 AM        Passed - Patient is age 12 or older        Passed - Medication is active on med list and the sig matches. RN to manually verify dose and sig if red X/fail.     If the protocol passes (green check), you do not need to verify med dose and sig.    A prescription matches if they are the same clinical intention.    For Example: once daily and every morning are the same.    The protocol can not identify upper and lower case letters as matching and will fail.     For Example: Take 1 tablet (50 mg) by mouth daily     TAKE 1 TABLET (50 MG) BY MOUTH DAILY    For all fails (red x), verify dose and sig.    If the refill does match what is on file, the RN can still proceed to approve the refill request.       If they do not match, route to the appropriate provider.             Passed - Recent (12 month) or future (90 days) visit with authorizing provider's specialty (provided they have been seen in the past 15 months)     The patient must have completed an in-person or virtual visit within the past 12 months or has a future visit scheduled within the next 90 days with the authorizing provider s specialty.  Urgent care and e-visits do not qualify as an office visit for this protocol.          Passed - Medication indicated for associated diagnosis     Medication is associated with one or more of the following diagnoses:   Allergic conjunctivitis  Allergies  Nasal congestion  Nasal discharge  Rhinitis  Sinus  congestion  Recurrent acute maxillary sinusitis  Environmental allergies   Acute sinusitis   Cystic Fibrosis  Bronchiectasis             Marybeth Reza RN on 5/29/2025 at 10:59 AM

## 2025-07-03 DIAGNOSIS — I25.119 CORONARY ARTERY DISEASE INVOLVING NATIVE HEART WITH ANGINA PECTORIS, UNSPECIFIED VESSEL OR LESION TYPE: ICD-10-CM

## 2025-07-03 RX ORDER — METOPROLOL SUCCINATE 25 MG/1
25 TABLET, EXTENDED RELEASE ORAL DAILY
Qty: 90 TABLET | Refills: 0 | OUTPATIENT
Start: 2025-07-03

## 2025-07-09 DIAGNOSIS — I25.119 CORONARY ARTERY DISEASE INVOLVING NATIVE HEART WITH ANGINA PECTORIS, UNSPECIFIED VESSEL OR LESION TYPE: ICD-10-CM

## 2025-07-10 RX ORDER — LISINOPRIL 10 MG/1
10 TABLET ORAL DAILY
Qty: 90 TABLET | Refills: 0 | Status: SHIPPED | OUTPATIENT
Start: 2025-07-10

## 2025-07-12 DIAGNOSIS — I25.119 CORONARY ARTERY DISEASE INVOLVING NATIVE HEART WITH ANGINA PECTORIS, UNSPECIFIED VESSEL OR LESION TYPE: ICD-10-CM

## 2025-07-14 RX ORDER — METOPROLOL SUCCINATE 25 MG/1
25 TABLET, EXTENDED RELEASE ORAL DAILY
Qty: 90 TABLET | Refills: 0 | Status: SHIPPED | OUTPATIENT
Start: 2025-07-14

## 2025-08-30 DIAGNOSIS — E78.5 HYPERLIPIDEMIA LDL GOAL <100: ICD-10-CM

## 2025-09-02 RX ORDER — ATORVASTATIN CALCIUM 80 MG/1
80 TABLET, FILM COATED ORAL DAILY
Qty: 30 TABLET | Refills: 0 | Status: SHIPPED | OUTPATIENT
Start: 2025-09-02

## (undated) DEVICE — BLADE SAW SAGITTAL STRK 21X90X1.19MM HD SYS 6 6221-119-090

## (undated) DEVICE — Device

## (undated) DEVICE — BNDG FLEXIGRIP F 11YD ROLL UNSTERILE LATEX FREE 0814-9095

## (undated) DEVICE — SU MONOCRYL 3-0 PS-2 18" UND Y497G

## (undated) DEVICE — SYR BULB IRRIG 50ML LATEX FREE 0035280

## (undated) DEVICE — PREP CHLORAPREP 26ML TINTED ORANGE  260815

## (undated) DEVICE — GOWN IMPERVIOUS SPECIALTY XLG/XLONG 32474

## (undated) DEVICE — SUCTION IRR SYSTEM W/TIP INTERPULSE

## (undated) DEVICE — SU VICRYL 1 CT-1 36" UND J947H

## (undated) DEVICE — GOWN LG DISP 9515

## (undated) DEVICE — SU STRATAFIX 3-0 MH 12" PS-2 SXMD1B103

## (undated) DEVICE — DRAPE SHEET REV FOLD 3/4 9349

## (undated) DEVICE — BONE CLEANING TIP INTERPULSE  0210-010-000

## (undated) DEVICE — SOL NACL 0.9% IRRIG 1000ML BOTTLE 07138-09

## (undated) DEVICE — CAST PADDING 6" UNSTERILE 9046

## (undated) DEVICE — DRSG AQUACEL AG 3.5X9.75" HYDROFIBER 412011

## (undated) DEVICE — SU VICRYL 2-0 CT-1 36" UND J945H

## (undated) DEVICE — BONE CEMENT MIXEVAC III HI VAC KIT  0206-015-000

## (undated) DEVICE — GLOVE PROTEXIS W/NEU-THERA 6.5  2D73TE65

## (undated) DEVICE — BLADE SAW OSCIL/SAG STRK 11X90X1.19MM 4/2000 4111-119-090

## (undated) DEVICE — SOL NACL 0.9% IRRIG 3000ML BAG 07972-08

## (undated) DEVICE — SU PDO 1 STRATAFIX 36X36CM CTX TAPERPOINT SXPD2B405

## (undated) DEVICE — GLOVE PROTEXIS BLUE W/NEU-THERA 6.5  2D73EB65

## (undated) DEVICE — BNDG COBAN 6"X5YDS STERILE

## (undated) DEVICE — GLOVE PROTEXIS W/NEU-THERA 8.0  2D73TE80

## (undated) DEVICE — SOL WATER IRRIG 1000ML BOTTLE 07139-09

## (undated) RX ORDER — ROPIVACAINE HYDROCHLORIDE 5 MG/ML
INJECTION, SOLUTION EPIDURAL; INFILTRATION; PERINEURAL
Status: DISPENSED
Start: 2017-06-01

## (undated) RX ORDER — ACETAMINOPHEN 325 MG/1
TABLET ORAL
Status: DISPENSED
Start: 2017-06-01

## (undated) RX ORDER — FENTANYL CITRATE 50 UG/ML
INJECTION, SOLUTION INTRAMUSCULAR; INTRAVENOUS
Status: DISPENSED
Start: 2017-06-01

## (undated) RX ORDER — CLINDAMYCIN PHOSPHATE 900 MG/50ML
INJECTION, SOLUTION INTRAVENOUS
Status: DISPENSED
Start: 2017-06-01

## (undated) RX ORDER — PROPOFOL 10 MG/ML
INJECTION, EMULSION INTRAVENOUS
Status: DISPENSED
Start: 2017-06-01

## (undated) RX ORDER — DEXAMETHASONE SODIUM PHOSPHATE 4 MG/ML
INJECTION, SOLUTION INTRA-ARTICULAR; INTRALESIONAL; INTRAMUSCULAR; INTRAVENOUS; SOFT TISSUE
Status: DISPENSED
Start: 2017-06-01